# Patient Record
Sex: MALE | Race: BLACK OR AFRICAN AMERICAN | NOT HISPANIC OR LATINO | Employment: FULL TIME | URBAN - METROPOLITAN AREA
[De-identification: names, ages, dates, MRNs, and addresses within clinical notes are randomized per-mention and may not be internally consistent; named-entity substitution may affect disease eponyms.]

---

## 2021-04-30 ENCOUNTER — OFFICE VISIT (OUTPATIENT)
Dept: INTERNAL MEDICINE CLINIC | Facility: CLINIC | Age: 56
End: 2021-04-30
Payer: COMMERCIAL

## 2021-04-30 VITALS
HEART RATE: 85 BPM | OXYGEN SATURATION: 97 % | BODY MASS INDEX: 33.08 KG/M2 | WEIGHT: 249.6 LBS | SYSTOLIC BLOOD PRESSURE: 138 MMHG | DIASTOLIC BLOOD PRESSURE: 84 MMHG | HEIGHT: 73 IN | TEMPERATURE: 98 F

## 2021-04-30 DIAGNOSIS — I10 HYPERTENSION, UNSPECIFIED TYPE: Primary | ICD-10-CM

## 2021-04-30 DIAGNOSIS — Z13.6 ENCOUNTER FOR SCREENING FOR CARDIOVASCULAR DISORDERS: ICD-10-CM

## 2021-04-30 PROCEDURE — 3008F BODY MASS INDEX DOCD: CPT | Performed by: INTERNAL MEDICINE

## 2021-04-30 PROCEDURE — 3725F SCREEN DEPRESSION PERFORMED: CPT | Performed by: INTERNAL MEDICINE

## 2021-04-30 PROCEDURE — 99203 OFFICE O/P NEW LOW 30 MIN: CPT | Performed by: INTERNAL MEDICINE

## 2021-04-30 PROCEDURE — 1036F TOBACCO NON-USER: CPT | Performed by: INTERNAL MEDICINE

## 2021-04-30 RX ORDER — AMLODIPINE BESYLATE AND BENAZEPRIL HYDROCHLORIDE 10; 40 MG/1; MG/1
CAPSULE ORAL
COMMUNITY
Start: 2021-02-27 | End: 2021-12-08 | Stop reason: SDUPTHER

## 2021-04-30 RX ORDER — CLONIDINE 0.3 MG/24H
1 PATCH, EXTENDED RELEASE TRANSDERMAL WEEKLY
COMMUNITY
Start: 2021-04-16 | End: 2022-02-14 | Stop reason: SDUPTHER

## 2021-04-30 NOTE — PROGRESS NOTES
BMI Counseling: Body mass index is 32 93 kg/m²  The BMI is above normal  Exercise recommendations include moderate physical activity 150 minutes/week  Depression Screening and Follow-up Plan: Patient's depression screening was positive with a PHQ-2 score of 0  Clincally patient does not have depression  No treatment is required  Assessment/Plan:    60year old male with HTN; continue med's as prescribed  Last colonosocpy done in 2019; repeat in 2024  Labs ordered including CBC, CMP, and lipid panel  No problem-specific Assessment & Plan notes found for this encounter  Diagnoses and all orders for this visit:    Hypertension, unspecified type  -     CBC and differential; Future    Encounter for screening for cardiovascular disorders  -     CBC and differential; Future  -     Lipid panel; Future  -     Comprehensive metabolic panel; Future    Other orders  -     amLODIPine-benazepril (LOTREL) 10-40 MG per capsule  -     cloNIDine (CATAPRES-TTS-3) 0 3 mg/24 hr          Subjective:      Patient ID: Eliseo Bronson is a 60 y o  male  Patient is a very pleasant 60year old male presenting to the clinic to establish care  He is originally from [facility] and has just moved to the area in March  He is  and has two children, an 6and 15year old  He is a   His wife works for the [employer] as a   He is starting a new job next week  He has a PMH for HTN  He is taking CCB/ARB along with clonidine patch  States his last colonoscopy was 2 years ago and polyps were found  Denies any cp, sob, palpitations, n/v/d/c, fever, chills  The following portions of the patient's history were reviewed and updated as appropriate:   He  has a past medical history of Hypertension  He There are no active problems to display for this patient  He  has no past surgical history on file  His family history is not on file  He  reports that he has never smoked   He has never used smokeless tobacco  He reports that he does not drink alcohol or use drugs  Current Outpatient Medications   Medication Sig Dispense Refill    amLODIPine-benazepril (LOTREL) 10-40 MG per capsule       cloNIDine (CATAPRES-TTS-3) 0 3 mg/24 hr        No current facility-administered medications for this visit  Current Outpatient Medications on File Prior to Visit   Medication Sig    amLODIPine-benazepril (LOTREL) 10-40 MG per capsule     cloNIDine (CATAPRES-TTS-3) 0 3 mg/24 hr      No current facility-administered medications on file prior to visit  He has No Known Allergies       Review of Systems   Constitutional: Negative for activity change and appetite change  HENT: Negative for congestion, dental problem and drooling  Eyes: Negative for photophobia, redness and visual disturbance  Respiratory: Negative for cough, chest tightness, shortness of breath and wheezing  Gastrointestinal: Negative for abdominal pain, blood in stool, constipation, diarrhea, nausea and vomiting  Endocrine: Negative for polydipsia, polyphagia and polyuria  Genitourinary: Negative for decreased urine volume and difficulty urinating  Musculoskeletal: Positive for back pain  Negative for joint swelling, myalgias and neck pain  Neurological: Negative for weakness and numbness  Psychiatric/Behavioral: Negative for decreased concentration, dysphoric mood and hallucinations  All other systems reviewed and are negative  Objective:      /84 (BP Location: Left arm, Patient Position: Sitting, Cuff Size: Adult)   Pulse 85   Temp 98 °F (36 7 °C) (Temporal)   Ht 6' 1" (1 854 m)   Wt 113 kg (249 lb 9 6 oz)   SpO2 97%   BMI 32 93 kg/m²          Physical Exam  Vitals signs and nursing note reviewed  Constitutional:       Appearance: Normal appearance  HENT:      Head: Normocephalic and atraumatic        Right Ear: Tympanic membrane normal       Left Ear: Tympanic membrane normal       Mouth/Throat:      Mouth: Mucous membranes are moist    Neck:      Musculoskeletal: Normal range of motion  Vascular: No carotid bruit  Cardiovascular:      Rate and Rhythm: Normal rate and regular rhythm  Heart sounds: Normal heart sounds  Pulmonary:      Effort: Pulmonary effort is normal       Breath sounds: Normal breath sounds  Abdominal:      General: Abdomen is flat  Bowel sounds are normal       Palpations: Abdomen is soft  Hernia: A hernia is present  Musculoskeletal: Normal range of motion  Lymphadenopathy:      Cervical: No cervical adenopathy  Skin:     General: Skin is warm and dry  Neurological:      General: No focal deficit present  Mental Status: He is alert and oriented to person, place, and time     Psychiatric:         Mood and Affect: Mood normal          Behavior: Behavior normal

## 2021-05-06 ENCOUNTER — APPOINTMENT (OUTPATIENT)
Dept: LAB | Facility: HOSPITAL | Age: 56
End: 2021-05-06
Attending: INTERNAL MEDICINE
Payer: COMMERCIAL

## 2021-05-06 DIAGNOSIS — N18.31 STAGE 3A CHRONIC KIDNEY DISEASE (HCC): Primary | ICD-10-CM

## 2021-05-06 DIAGNOSIS — R73.01 ELEVATED FASTING BLOOD SUGAR: ICD-10-CM

## 2021-05-24 ENCOUNTER — TELEPHONE (OUTPATIENT)
Dept: NEPHROLOGY | Facility: CLINIC | Age: 56
End: 2021-05-24

## 2021-09-15 ENCOUNTER — TELEPHONE (OUTPATIENT)
Dept: NEPHROLOGY | Facility: CLINIC | Age: 56
End: 2021-09-15

## 2021-09-21 ENCOUNTER — TELEPHONE (OUTPATIENT)
Dept: NEPHROLOGY | Facility: CLINIC | Age: 56
End: 2021-09-21

## 2021-09-22 ENCOUNTER — CONSULT (OUTPATIENT)
Dept: NEPHROLOGY | Facility: CLINIC | Age: 56
End: 2021-09-22
Payer: COMMERCIAL

## 2021-09-22 VITALS
DIASTOLIC BLOOD PRESSURE: 80 MMHG | WEIGHT: 257 LBS | SYSTOLIC BLOOD PRESSURE: 120 MMHG | HEART RATE: 64 BPM | TEMPERATURE: 97 F | RESPIRATION RATE: 16 BRPM | BODY MASS INDEX: 32.98 KG/M2 | HEIGHT: 74 IN

## 2021-09-22 DIAGNOSIS — M89.9 CHRONIC KIDNEY DISEASE-MINERAL AND BONE DISORDER: ICD-10-CM

## 2021-09-22 DIAGNOSIS — N18.31 STAGE 3A CHRONIC KIDNEY DISEASE (HCC): ICD-10-CM

## 2021-09-22 DIAGNOSIS — N18.9 CHRONIC KIDNEY DISEASE-MINERAL AND BONE DISORDER: ICD-10-CM

## 2021-09-22 DIAGNOSIS — E83.9 CHRONIC KIDNEY DISEASE-MINERAL AND BONE DISORDER: ICD-10-CM

## 2021-09-22 DIAGNOSIS — I10 ESSENTIAL HYPERTENSION: Primary | ICD-10-CM

## 2021-09-22 PROCEDURE — 3008F BODY MASS INDEX DOCD: CPT | Performed by: INTERNAL MEDICINE

## 2021-09-22 PROCEDURE — 1036F TOBACCO NON-USER: CPT | Performed by: INTERNAL MEDICINE

## 2021-09-22 PROCEDURE — 99204 OFFICE O/P NEW MOD 45 MIN: CPT | Performed by: INTERNAL MEDICINE

## 2021-09-22 NOTE — PROGRESS NOTES
NEPHROLOGY OFFICE CONSULT  Breanna Rivas 60 y o  male MRN: [id number]    Encounter: [id number] 9/22/2021    REASON FOR VISIT: Breanna Rivas is a 60 y o male who was referred by Warren Clay MD for evaluation of Chronic Kidney Disease and Consult    HPI:    Chun Pearson came in today for evaluation management of CKD  58-year-old gentleman with blood pressure problem many years was found to abnormal creatinine so was advised to see me     Patient is quite asymptomatic denies any complaint    He is a  and has some back pain sometime take Advil but not regularly     Denies any chest pain palpitation     Denies any urinary complaint      REVIEW OF SYSTEMS:    Review of Systems   Constitutional: Negative for activity change and fatigue  HENT: Negative for congestion and ear discharge  Eyes: Negative for photophobia and pain  Respiratory: Negative for apnea and choking  Cardiovascular: Negative for chest pain and palpitations  Gastrointestinal: Negative for abdominal distention and blood in stool  Endocrine: Negative for heat intolerance and polyphagia  Genitourinary: Negative for flank pain and urgency  Musculoskeletal: Negative for neck pain and neck stiffness  Skin: Negative for color change and wound  Allergic/Immunologic: Negative for food allergies and immunocompromised state  Neurological: Negative for seizures and facial asymmetry  Hematological: Negative for adenopathy  Does not bruise/bleed easily  Psychiatric/Behavioral: Negative for self-injury and suicidal ideas  PAST MEDICAL HISTORY:  Past Medical History:   Diagnosis Date    Chronic kidney disease     Hypertension        PAST SURGICAL HISTORY:  History reviewed  No pertinent surgical history      SOCIAL HISTORY:  Social History     Substance and Sexual Activity   Alcohol Use Never     Social History     Substance and Sexual Activity   Drug Use Never     Social History     Tobacco Use   Smoking Status Never Smoker   Smokeless Tobacco Never Used       FAMILY HISTORY:  Family History   Problem Relation Age of Onset    No Known Problems Mother     No Known Problems Father     Cancer Sister     Cancer Brother     No Known Problems Brother     No Known Problems Brother        MEDICATIONS:    Current Outpatient Medications:     amLODIPine-benazepril (LOTREL) 10-40 MG per capsule, , Disp: , Rfl:     cloNIDine (CATAPRES-TTS-3) 0 3 mg/24 hr, , Disp: , Rfl:     PHYSICAL EXAM:  Vitals:    09/22/21 1627   BP: 120/80   BP Location: Right arm   Patient Position: Sitting   Pulse: 64   Resp: 16   Temp: (!) 97 °F (36 1 °C)   TempSrc: Temporal   Weight: 117 kg (257 lb)   Height: 6' 1 5" (1 867 m)     Body mass index is 33 45 kg/m²  Physical Exam  Constitutional:       General: He is not in acute distress  Appearance: He is well-developed  He is obese  HENT:      Head: Normocephalic  Eyes:      General: No scleral icterus  Conjunctiva/sclera: Conjunctivae normal    Neck:      Vascular: No JVD  Cardiovascular:      Rate and Rhythm: Normal rate and regular rhythm  Heart sounds: Normal heart sounds  Pulmonary:      Effort: Pulmonary effort is normal       Breath sounds: Normal breath sounds  No wheezing  Abdominal:      General: Bowel sounds are normal       Palpations: Abdomen is soft  Tenderness: There is no abdominal tenderness  Musculoskeletal:         General: No swelling  Normal range of motion  Cervical back: Normal range of motion and neck supple  Skin:     General: Skin is warm  Findings: No rash  Neurological:      General: No focal deficit present  Mental Status: He is alert and oriented to person, place, and time     Psychiatric:         Behavior: Behavior normal          LAB RESULTS:  Results for orders placed or performed in visit on 05/05/21   CBC and differential   Result Value Ref Range    WBC 3 75 (L) 4 31 - 10 16 Thousand/uL    RBC 4 84 3 88 - 5 62 Million/uL    Hemoglobin 14 1 12 0 - 17 0 g/dL    Hematocrit 44 5 36 5 - 49 3 %    MCV 92 82 - 98 fL    MCH 29 1 26 8 - 34 3 pg    MCHC 31 7 31 4 - 37 4 g/dL    RDW 13 4 11 6 - 15 1 %    MPV 10 6 8 9 - 12 7 fL    Platelets 317 579 - 031 Thousands/uL    nRBC 0 /100 WBCs    Neutrophils Relative 41 (L) 43 - 75 %    Immat GRANS % 0 0 - 2 %    Lymphocytes Relative 48 (H) 14 - 44 %    Monocytes Relative 8 4 - 12 %    Eosinophils Relative 2 0 - 6 %    Basophils Relative 1 0 - 1 %    Neutrophils Absolute 1 54 (L) 1 85 - 7 62 Thousands/µL    Immature Grans Absolute 0 00 0 00 - 0 20 Thousand/uL    Lymphocytes Absolute 1 79 0 60 - 4 47 Thousands/µL    Monocytes Absolute 0 31 0 17 - 1 22 Thousand/µL    Eosinophils Absolute 0 08 0 00 - 0 61 Thousand/µL    Basophils Absolute 0 03 0 00 - 0 10 Thousands/µL   Lipid panel   Result Value Ref Range    Cholesterol 137 50 - 200 mg/dL    Triglycerides 42 <=150 mg/dL    HDL, Direct 34 (L) >=40 mg/dL    LDL Calculated 95 0 - 100 mg/dL    Non-HDL-Chol (CHOL-HDL) 103 mg/dl   Comprehensive metabolic panel   Result Value Ref Range    Sodium 141 136 - 145 mmol/L    Potassium 4 1 3 5 - 5 3 mmol/L    Chloride 105 100 - 108 mmol/L    CO2 25 21 - 32 mmol/L    ANION GAP 11 4 - 13 mmol/L    BUN 20 5 - 25 mg/dL    Creatinine 1 55 (H) 0 60 - 1 30 mg/dL    Glucose, Fasting 108 (H) 65 - 99 mg/dL    Calcium 8 6 8 3 - 10 1 mg/dL    AST 28 5 - 45 U/L    ALT 54 12 - 78 U/L    Alkaline Phosphatase 77 46 - 116 U/L    Total Protein 7 2 6 4 - 8 2 g/dL    Albumin 3 6 3 5 - 5 0 g/dL    Total Bilirubin 0 42 0 20 - 1 00 mg/dL    eGFR 56 ml/min/1 73sq m   HEMOGLOBIN A1C W/ EAG ESTIMATION   Result Value Ref Range    Hemoglobin A1C 6 2 (H) Normal 3 8-5 6%; PreDiabetic 5 7-6 4%;  Diabetic >=6 5%; Glycemic control for adults with diabetes <7 0% %     mg/dl       ASSESSMENT and PLAN:    Stage 3a chronic kidney disease (HCC)  Lab Results   Component Value Date    EGFR 56 05/06/2021    CREATININE 1 55 (H) 05/06/2021 Patient GFR is about 56 making it stage III  Possible etiology hypertensive nephrosclerosis  I do not have previous data saw like to repeated after proper hydration to see is that an acute component or not    Pathophysiology of kidney disease discussed at length with the patient  Importance of hydration and avoidance of nephrotoxic medicine also discussed with him  Asymptomatic and progressive nature of kidney disease also discussed with him     I will repeat blood test urine test and kidney ultrasound in 1 month    Essential hypertension  Blood pressure is very well controlled with present medication which include ACE-inhibitor which I will continue  Importance of blood pressure control also discussed with him    Chronic kidney disease-mineral and bone disorder  Lab Results   Component Value Date    EGFR 56 05/06/2021    CREATININE 1 55 (H) 05/06/2021   PTH and phosphorus along with vitamin-D will be measured as part of the CKD management    Everything discussed at length with the patient  I will see him back in 3 months  He will get blood and urine test before that visit  Advised to lose weight also as part of the CKD management  Thank you very much for consultation I will monitor the patient with you      Portions of the record may have been created with voice recognition software  Occasional wrong word or "sound a like" substitutions may have occurred due to the inherent limitations of voice recognition software  Read the chart carefully and recognize, using context, where substitutions have occurred  If you have any questions, please contact the dictating provider

## 2021-09-22 NOTE — PATIENT INSTRUCTIONS
Chronic Kidney Disease   AMBULATORY CARE:   Chronic kidney disease (CKD)  is the gradual and permanent loss of kidney function  Normally, the kidneys remove fluid, chemicals, and waste from your blood  These wastes are turned into urine by your kidneys  CKD may worsen over time and lead to kidney failure  Common signs and symptoms include the following:   · Changes in how often you need to urinate    · Swelling in your arms, legs, or feet    · Shortness of breath    · Fatigue or weakness    · Bad or bitter taste in your mouth    · Nausea, vomiting, or loss of appetite    Call your local emergency number (911 in the [address] [facility]) if:   · You have a seizure  · You have shortness of breath  Seek care immediately if:   · You are confused and very drowsy  Call your doctor or nephrologist if:   · You suddenly gain or lose more weight than your healthcare provider has told you is okay  · You have itchy skin or a rash  · You urinate more or less than you normally do  · You have blood in your urine  · You have nausea and are vomiting  · You have fatigue or muscle weakness  · You have hiccups that will not stop  · You have questions or concerns about your condition or care  How CKD is diagnosed:  CKD has 5 stages  Your healthcare provider will use results from the following tests to find the stage of CKD you have:  · Blood and urine tests  show how well your kidneys are working  They may also show the cause of your CKD  · Ultrasound, CT scan, or MRI  pictures may be used to check your kidneys  You may be given contrast liquid to help your kidneys show up better in the pictures  Tell the healthcare provider if you have ever had an allergic reaction to contrast liquid  Do not enter the MRI room with anything metal  Metal can cause serious injury  Tell the healthcare provider if you have any metal in or on your body      · A biopsy  is a procedure to remove a small piece of tissue from your kidney  It is done to find the cause of your CKD  Treatment  can help control signs and symptoms, and prevent a worse stage of CKD  Your care team may include specialists, such as a dietitian or a heart specialist  This depends on the stage of your CKD and if you have other health conditions to manage  Healthcare providers will work with you to create a plan based on your decisions for treatment  Your treatment plan may include any of the following:  · Medicines  may be given to decrease your blood pressure and get rid of extra fluid  You may also receive medicine to manage health conditions that may occur with CKD, such as anemia, diabetes, and heart disease  · Dialysis  is a treatment to remove chemicals and waste from your blood when your kidneys can no longer do this  · Surgery  may be needed to create an arteriovenous fistula (AVF) in your arm or insert a catheter into your abdomen  This is done so you can receive dialysis  · A kidney transplant  may be done if your CKD becomes severe  What you can do to manage CKD: Management may include making some lifestyle changes  Tell your healthcare provider if you have any concerns about being able to make changes  He or she can help you find solutions, including working with specialists  Ask for help creating a plan to break large goals into smaller steps  Your plan may include any of the following:  · Manage other health conditions  Your healthcare provider will work with you to make a care plan that meets your needs  You will be checked regularly for heart disease or other conditions that can make CKD worse, such as diabetes  Your blood pressure will be closely monitored  You will also get a target blood pressure and help making a plan to reach your target  This may include taking your blood pressure at home  · Maintain a healthy weight  Your weight and body mass index (BMI) will be checked regularly   BMI helps find if your weight is healthy for your height  Your healthcare provider will use other tests to check your muscle and protein levels  Extra weight can strain your kidneys  A low weight or low muscle mass can make you feel more tired  You may have trouble doing your daily activities  Ask your provider what a healthy weight is for you  He or she can help you create a plan to lose or gain weight safely, if needed  The plan may include keeping a food diary  This is a list of foods and liquids you have each day  Your provider will use the diary to help you make changes, if needed  Changes are based on your health and any other conditions you have, such as diabetes  · Create an exercise plan  Regular exercise can help you manage CKD, high blood pressure, and diabetes  Exercise also helps control weight  Your provider can help you create exercise goals and a plan to reach those goals  For example, your goal may be to exercise for 30 minutes in a day  Your plan can include breaking exercise into 10 minute sessions, 3 times during the day  · Create a healthy eating plan  Your provider may tell you to eat food low in potassium, phosphorus, or protein  Your provider may also recommend vitamin or mineral supplements  Do not take any supplements without talking to your provider  A dietitian can help you plan meals if needed  Ask how much liquid to drink each day and which liquids are best for you  · Limit sodium (salt) as directed  You may need to limit sodium to less than 2,300 milligrams (mg) each day  Ask your dietitian or healthcare provider how much sodium you can have each day  The amount depends on your stage of kidney disease  Table salt, canned foods, soups, salted snacks, and processed meats, like deli meats and sausage, are high in sodium  Your provider or a dietitian can show you how to read food labels for sodium  · Limit alcohol as directed  Alcohol can cause fluid retention and can affect your kidneys   Ask how much alcohol is safe for you  A drink of alcohol is 12 ounces of beer, 5 ounces of wine, or 1½ ounces of liquor  · Do not smoke  Nicotine and other chemicals in cigarettes and cigars can cause kidney damage  Ask your provider for information if you currently smoke and need help to quit  E-cigarettes or smokeless tobacco still contain nicotine  Talk to your provider before you use these products  · Ask about over-the-counter medicines  Medicines such as NSAIDs and laxatives may harm your kidneys  Some cough and cold medicines can raise your blood pressure  Always ask if a medicine is safe before you take it  · Ask about vaccines you may need  CKD can increase your risk for infections such as pneumonia, influenza, and hepatitis  Vaccines lower your risk for infection  Your healthcare provider will tell you which vaccines you need and when to get them  Follow up with your doctor or nephrologist as directed: You will need to return for tests to monitor your kidney and nerve function, and your parathyroid hormone level  Your medicines may be changed, based on certain test results  Write down your questions so you remember to ask them during your visits  © Copyright [employer] 2021 Information is for End User's use only and may not be sold, redistributed or otherwise used for commercial purposes  All illustrations and images included in CareNotes® are the copyrighted property of [employer]  or Rachel Tracey  The above information is an  only  It is not intended as medical advice for individual conditions or treatments  Talk to your doctor, nurse or pharmacist before following any medical regimen to see if it is safe and effective for you

## 2021-09-22 NOTE — ASSESSMENT & PLAN NOTE
Blood pressure is very well controlled with present medication which include ACE-inhibitor which I will continue    Importance of blood pressure control also discussed with him

## 2021-09-22 NOTE — ASSESSMENT & PLAN NOTE
Lab Results   Component Value Date    EGFR 56 05/06/2021    CREATININE 1 55 (H) 05/06/2021   Patient GFR is about 56 making it stage III  Possible etiology hypertensive nephrosclerosis  I do not have previous data saw like to repeated after proper hydration to see is that an acute component or not    Pathophysiology of kidney disease discussed at length with the patient  Importance of hydration and avoidance of nephrotoxic medicine also discussed with him    Asymptomatic and progressive nature of kidney disease also discussed with him     I will repeat blood test urine test and kidney ultrasound in 1 month

## 2021-09-22 NOTE — ASSESSMENT & PLAN NOTE
Lab Results   Component Value Date    EGFR 56 05/06/2021    CREATININE 1 55 (H) 05/06/2021   PTH and phosphorus along with vitamin-D will be measured as part of the CKD management

## 2021-10-08 ENCOUNTER — TELEPHONE (OUTPATIENT)
Dept: NEPHROLOGY | Facility: CLINIC | Age: 56
End: 2021-10-08

## 2021-10-21 DIAGNOSIS — Z12.11 SCREEN FOR COLON CANCER: Primary | ICD-10-CM

## 2021-10-26 ENCOUNTER — RA CDI HCC (OUTPATIENT)
Dept: OTHER | Facility: HOSPITAL | Age: 56
End: 2021-10-26

## 2021-11-04 ENCOUNTER — OFFICE VISIT (OUTPATIENT)
Dept: INTERNAL MEDICINE CLINIC | Facility: CLINIC | Age: 56
End: 2021-11-04
Payer: COMMERCIAL

## 2021-11-04 VITALS
HEIGHT: 74 IN | OXYGEN SATURATION: 97 % | DIASTOLIC BLOOD PRESSURE: 90 MMHG | HEART RATE: 58 BPM | SYSTOLIC BLOOD PRESSURE: 134 MMHG | TEMPERATURE: 98.4 F | WEIGHT: 257.8 LBS | BODY MASS INDEX: 33.09 KG/M2

## 2021-11-04 DIAGNOSIS — I10 ESSENTIAL HYPERTENSION: Primary | ICD-10-CM

## 2021-11-04 DIAGNOSIS — N18.31 STAGE 3A CHRONIC KIDNEY DISEASE (HCC): ICD-10-CM

## 2021-11-04 DIAGNOSIS — Z13.6 ENCOUNTER FOR LIPID SCREENING FOR CARDIOVASCULAR DISEASE: ICD-10-CM

## 2021-11-04 DIAGNOSIS — Z11.4 ENCOUNTER FOR SCREENING FOR HIV: ICD-10-CM

## 2021-11-04 DIAGNOSIS — Z23 NEED FOR VACCINATION: ICD-10-CM

## 2021-11-04 DIAGNOSIS — Z13.220 ENCOUNTER FOR LIPID SCREENING FOR CARDIOVASCULAR DISEASE: ICD-10-CM

## 2021-11-04 DIAGNOSIS — Z11.59 ENCOUNTER FOR HEPATITIS C SCREENING TEST FOR LOW RISK PATIENT: ICD-10-CM

## 2021-11-04 PROCEDURE — 99214 OFFICE O/P EST MOD 30 MIN: CPT | Performed by: INTERNAL MEDICINE

## 2021-11-04 PROCEDURE — 3080F DIAST BP >= 90 MM HG: CPT | Performed by: INTERNAL MEDICINE

## 2021-11-04 PROCEDURE — 3075F SYST BP GE 130 - 139MM HG: CPT | Performed by: INTERNAL MEDICINE

## 2021-11-04 PROCEDURE — 90471 IMMUNIZATION ADMIN: CPT | Performed by: INTERNAL MEDICINE

## 2021-11-04 PROCEDURE — 1036F TOBACCO NON-USER: CPT | Performed by: INTERNAL MEDICINE

## 2021-11-04 PROCEDURE — 90682 RIV4 VACC RECOMBINANT DNA IM: CPT | Performed by: INTERNAL MEDICINE

## 2021-11-04 PROCEDURE — 3008F BODY MASS INDEX DOCD: CPT | Performed by: INTERNAL MEDICINE

## 2021-11-20 ENCOUNTER — LAB (OUTPATIENT)
Dept: LAB | Facility: HOSPITAL | Age: 56
End: 2021-11-20
Payer: COMMERCIAL

## 2021-11-20 DIAGNOSIS — N18.31 STAGE 3A CHRONIC KIDNEY DISEASE (HCC): ICD-10-CM

## 2021-11-20 DIAGNOSIS — R73.01 ELEVATED FASTING BLOOD SUGAR: ICD-10-CM

## 2021-11-20 DIAGNOSIS — Z11.4 ENCOUNTER FOR SCREENING FOR HIV: ICD-10-CM

## 2021-11-20 DIAGNOSIS — Z13.6 ENCOUNTER FOR LIPID SCREENING FOR CARDIOVASCULAR DISEASE: ICD-10-CM

## 2021-11-20 DIAGNOSIS — I10 ESSENTIAL HYPERTENSION: ICD-10-CM

## 2021-11-20 DIAGNOSIS — Z11.59 ENCOUNTER FOR HEPATITIS C SCREENING TEST FOR LOW RISK PATIENT: ICD-10-CM

## 2021-11-20 DIAGNOSIS — Z13.220 ENCOUNTER FOR LIPID SCREENING FOR CARDIOVASCULAR DISEASE: ICD-10-CM

## 2021-11-20 LAB
ALBUMIN SERPL BCP-MCNC: 3.9 G/DL (ref 3.5–5)
ALP SERPL-CCNC: 75 U/L (ref 46–116)
ALT SERPL W P-5'-P-CCNC: 49 U/L (ref 12–78)
ANION GAP SERPL CALCULATED.3IONS-SCNC: 7 MMOL/L (ref 4–13)
AST SERPL W P-5'-P-CCNC: 24 U/L (ref 5–45)
BASOPHILS # BLD AUTO: 0.03 THOUSANDS/ΜL (ref 0–0.1)
BASOPHILS NFR BLD AUTO: 1 % (ref 0–1)
BILIRUB SERPL-MCNC: 0.87 MG/DL (ref 0.2–1)
BUN SERPL-MCNC: 13 MG/DL (ref 5–25)
CALCIUM SERPL-MCNC: 8.6 MG/DL (ref 8.3–10.1)
CHLORIDE SERPL-SCNC: 105 MMOL/L (ref 100–108)
CHOLEST SERPL-MCNC: 148 MG/DL
CO2 SERPL-SCNC: 29 MMOL/L (ref 21–32)
CREAT SERPL-MCNC: 1.52 MG/DL (ref 0.6–1.3)
EOSINOPHIL # BLD AUTO: 0.07 THOUSAND/ΜL (ref 0–0.61)
EOSINOPHIL NFR BLD AUTO: 2 % (ref 0–6)
ERYTHROCYTE [DISTWIDTH] IN BLOOD BY AUTOMATED COUNT: 13.6 % (ref 11.6–15.1)
GFR SERPL CREATININE-BSD FRML MDRD: 58 ML/MIN/1.73SQ M
GLUCOSE P FAST SERPL-MCNC: 98 MG/DL (ref 65–99)
HCT VFR BLD AUTO: 46.6 % (ref 36.5–49.3)
HCV AB SER QL: NORMAL
HDLC SERPL-MCNC: 40 MG/DL
HGB BLD-MCNC: 14.8 G/DL (ref 12–17)
IMM GRANULOCYTES # BLD AUTO: 0.01 THOUSAND/UL (ref 0–0.2)
IMM GRANULOCYTES NFR BLD AUTO: 0 % (ref 0–2)
LDLC SERPL CALC-MCNC: 94 MG/DL (ref 0–100)
LYMPHOCYTES # BLD AUTO: 1.72 THOUSANDS/ΜL (ref 0.6–4.47)
LYMPHOCYTES NFR BLD AUTO: 39 % (ref 14–44)
MCH RBC QN AUTO: 29.5 PG (ref 26.8–34.3)
MCHC RBC AUTO-ENTMCNC: 31.8 G/DL (ref 31.4–37.4)
MCV RBC AUTO: 93 FL (ref 82–98)
MONOCYTES # BLD AUTO: 0.35 THOUSAND/ΜL (ref 0.17–1.22)
MONOCYTES NFR BLD AUTO: 8 % (ref 4–12)
NEUTROPHILS # BLD AUTO: 2.18 THOUSANDS/ΜL (ref 1.85–7.62)
NEUTS SEG NFR BLD AUTO: 50 % (ref 43–75)
NONHDLC SERPL-MCNC: 108 MG/DL
NRBC BLD AUTO-RTO: 0 /100 WBCS
PLATELET # BLD AUTO: 216 THOUSANDS/UL (ref 149–390)
PMV BLD AUTO: 10.1 FL (ref 8.9–12.7)
POTASSIUM SERPL-SCNC: 4.4 MMOL/L (ref 3.5–5.3)
PROT SERPL-MCNC: 7.4 G/DL (ref 6.4–8.2)
RBC # BLD AUTO: 5.02 MILLION/UL (ref 3.88–5.62)
SODIUM SERPL-SCNC: 141 MMOL/L (ref 136–145)
TRIGL SERPL-MCNC: 69 MG/DL
WBC # BLD AUTO: 4.36 THOUSAND/UL (ref 4.31–10.16)

## 2021-11-20 PROCEDURE — 87389 HIV-1 AG W/HIV-1&-2 AB AG IA: CPT

## 2021-11-20 PROCEDURE — 85025 COMPLETE CBC W/AUTO DIFF WBC: CPT

## 2021-11-20 PROCEDURE — 80053 COMPREHEN METABOLIC PANEL: CPT

## 2021-11-20 PROCEDURE — 36415 COLL VENOUS BLD VENIPUNCTURE: CPT

## 2021-11-20 PROCEDURE — 83036 HEMOGLOBIN GLYCOSYLATED A1C: CPT

## 2021-11-20 PROCEDURE — 80061 LIPID PANEL: CPT

## 2021-11-20 PROCEDURE — 86803 HEPATITIS C AB TEST: CPT

## 2021-11-21 LAB — HIV 1+2 AB+HIV1 P24 AG SERPL QL IA: NORMAL

## 2021-11-22 LAB
EST. AVERAGE GLUCOSE BLD GHB EST-MCNC: 134 MG/DL
HBA1C MFR BLD: 6.3 %

## 2021-11-23 ENCOUNTER — TELEPHONE (OUTPATIENT)
Dept: NEPHROLOGY | Facility: CLINIC | Age: 56
End: 2021-11-23

## 2021-12-08 DIAGNOSIS — I10 ESSENTIAL HYPERTENSION: Primary | ICD-10-CM

## 2021-12-08 RX ORDER — AMLODIPINE BESYLATE AND BENAZEPRIL HYDROCHLORIDE 10; 40 MG/1; MG/1
1 CAPSULE ORAL DAILY
Qty: 90 CAPSULE | Refills: 0 | Status: SHIPPED | OUTPATIENT
Start: 2021-12-08 | End: 2022-02-10 | Stop reason: SDUPTHER

## 2021-12-15 ENCOUNTER — TELEPHONE (OUTPATIENT)
Dept: NEPHROLOGY | Facility: CLINIC | Age: 56
End: 2021-12-15

## 2021-12-16 ENCOUNTER — TELEPHONE (OUTPATIENT)
Dept: NEPHROLOGY | Facility: CLINIC | Age: 56
End: 2021-12-16

## 2022-01-24 ENCOUNTER — TELEPHONE (OUTPATIENT)
Dept: NEPHROLOGY | Facility: CLINIC | Age: 57
End: 2022-01-24

## 2022-01-28 ENCOUNTER — TELEPHONE (OUTPATIENT)
Dept: NEPHROLOGY | Facility: CLINIC | Age: 57
End: 2022-01-28

## 2022-01-28 NOTE — TELEPHONE ENCOUNTER
Appointment was confirmed with patient wife Colleen Villanueva and the patient  I called the patient wife Colleen Villanueva, because the patient voice mail box was not set up, and I was not able to leave a message    Jmaal Fitzgerald,

## 2022-01-31 ENCOUNTER — APPOINTMENT (OUTPATIENT)
Dept: LAB | Facility: HOSPITAL | Age: 57
End: 2022-01-31
Payer: COMMERCIAL

## 2022-01-31 ENCOUNTER — OFFICE VISIT (OUTPATIENT)
Dept: NEPHROLOGY | Facility: CLINIC | Age: 57
End: 2022-01-31
Payer: COMMERCIAL

## 2022-01-31 VITALS
RESPIRATION RATE: 16 BRPM | BODY MASS INDEX: 33.32 KG/M2 | SYSTOLIC BLOOD PRESSURE: 120 MMHG | OXYGEN SATURATION: 97 % | TEMPERATURE: 97.3 F | WEIGHT: 259.6 LBS | HEART RATE: 62 BPM | DIASTOLIC BLOOD PRESSURE: 80 MMHG | HEIGHT: 74 IN

## 2022-01-31 DIAGNOSIS — Z13.220 ENCOUNTER FOR LIPID SCREENING FOR CARDIOVASCULAR DISEASE: ICD-10-CM

## 2022-01-31 DIAGNOSIS — I10 ESSENTIAL HYPERTENSION: ICD-10-CM

## 2022-01-31 DIAGNOSIS — E83.9 CHRONIC KIDNEY DISEASE-MINERAL AND BONE DISORDER: ICD-10-CM

## 2022-01-31 DIAGNOSIS — N18.31 STAGE 3A CHRONIC KIDNEY DISEASE (HCC): ICD-10-CM

## 2022-01-31 DIAGNOSIS — Z13.6 ENCOUNTER FOR LIPID SCREENING FOR CARDIOVASCULAR DISEASE: ICD-10-CM

## 2022-01-31 DIAGNOSIS — M89.9 CHRONIC KIDNEY DISEASE-MINERAL AND BONE DISORDER: ICD-10-CM

## 2022-01-31 DIAGNOSIS — N18.9 CHRONIC KIDNEY DISEASE-MINERAL AND BONE DISORDER: ICD-10-CM

## 2022-01-31 DIAGNOSIS — N18.31 STAGE 3A CHRONIC KIDNEY DISEASE (HCC): Primary | ICD-10-CM

## 2022-01-31 DIAGNOSIS — N18.32 STAGE 3B CHRONIC KIDNEY DISEASE (HCC): ICD-10-CM

## 2022-01-31 LAB
25(OH)D3 SERPL-MCNC: 25.5 NG/ML (ref 30–100)
ANION GAP SERPL CALCULATED.3IONS-SCNC: 5 MMOL/L (ref 4–13)
BASOPHILS # BLD AUTO: 0.02 THOUSANDS/ΜL (ref 0–0.1)
BASOPHILS NFR BLD AUTO: 0 % (ref 0–1)
BUN SERPL-MCNC: 15 MG/DL (ref 5–25)
CALCIUM SERPL-MCNC: 9 MG/DL (ref 8.3–10.1)
CHLORIDE SERPL-SCNC: 104 MMOL/L (ref 100–108)
CO2 SERPL-SCNC: 31 MMOL/L (ref 21–32)
CREAT SERPL-MCNC: 1.59 MG/DL (ref 0.6–1.3)
EOSINOPHIL # BLD AUTO: 0.1 THOUSAND/ΜL (ref 0–0.61)
EOSINOPHIL NFR BLD AUTO: 2 % (ref 0–6)
ERYTHROCYTE [DISTWIDTH] IN BLOOD BY AUTOMATED COUNT: 13.9 % (ref 11.6–15.1)
GFR SERPL CREATININE-BSD FRML MDRD: 46 ML/MIN/1.73SQ M
GLUCOSE P FAST SERPL-MCNC: 91 MG/DL (ref 65–99)
HCT VFR BLD AUTO: 46.3 % (ref 36.5–49.3)
HGB BLD-MCNC: 14.7 G/DL (ref 12–17)
IMM GRANULOCYTES # BLD AUTO: 0.01 THOUSAND/UL (ref 0–0.2)
IMM GRANULOCYTES NFR BLD AUTO: 0 % (ref 0–2)
LYMPHOCYTES # BLD AUTO: 1.86 THOUSANDS/ΜL (ref 0.6–4.47)
LYMPHOCYTES NFR BLD AUTO: 39 % (ref 14–44)
MCH RBC QN AUTO: 29.2 PG (ref 26.8–34.3)
MCHC RBC AUTO-ENTMCNC: 31.7 G/DL (ref 31.4–37.4)
MCV RBC AUTO: 92 FL (ref 82–98)
MONOCYTES # BLD AUTO: 0.43 THOUSAND/ΜL (ref 0.17–1.22)
MONOCYTES NFR BLD AUTO: 9 % (ref 4–12)
NEUTROPHILS # BLD AUTO: 2.39 THOUSANDS/ΜL (ref 1.85–7.62)
NEUTS SEG NFR BLD AUTO: 50 % (ref 43–75)
NRBC BLD AUTO-RTO: 0 /100 WBCS
PHOSPHATE SERPL-MCNC: 3.1 MG/DL (ref 2.7–4.5)
PLATELET # BLD AUTO: 193 THOUSANDS/UL (ref 149–390)
PMV BLD AUTO: 10.2 FL (ref 8.9–12.7)
POTASSIUM SERPL-SCNC: 4 MMOL/L (ref 3.5–5.3)
PTH-INTACT SERPL-MCNC: 77.6 PG/ML (ref 18.4–80.1)
RBC # BLD AUTO: 5.03 MILLION/UL (ref 3.88–5.62)
SODIUM SERPL-SCNC: 140 MMOL/L (ref 136–145)
WBC # BLD AUTO: 4.81 THOUSAND/UL (ref 4.31–10.16)

## 2022-01-31 PROCEDURE — 85025 COMPLETE CBC W/AUTO DIFF WBC: CPT

## 2022-01-31 PROCEDURE — 80048 BASIC METABOLIC PNL TOTAL CA: CPT

## 2022-01-31 PROCEDURE — 36415 COLL VENOUS BLD VENIPUNCTURE: CPT

## 2022-01-31 PROCEDURE — 84100 ASSAY OF PHOSPHORUS: CPT

## 2022-01-31 PROCEDURE — 99214 OFFICE O/P EST MOD 30 MIN: CPT | Performed by: INTERNAL MEDICINE

## 2022-01-31 PROCEDURE — 83970 ASSAY OF PARATHORMONE: CPT

## 2022-01-31 PROCEDURE — 82306 VITAMIN D 25 HYDROXY: CPT

## 2022-01-31 NOTE — PATIENT INSTRUCTIONS
Chronic Kidney Disease   AMBULATORY CARE:   Chronic kidney disease (CKD)  is the gradual and permanent loss of kidney function  Normally, the kidneys remove fluid, chemicals, and waste from your blood  These wastes are turned into urine by your kidneys  CKD may worsen over time and lead to kidney failure  Common signs and symptoms include the following:   · Changes in how often you need to urinate    · Swelling in your arms, legs, or feet    · Shortness of breath    · Fatigue or weakness    · Bad or bitter taste in your mouth    · Nausea, vomiting, or loss of appetite    Call your local emergency number (911 in the [address] [facility]) if:   · You have a seizure  · You have shortness of breath  Seek care immediately if:   · You are confused and very drowsy  Call your doctor or nephrologist if:   · You suddenly gain or lose more weight than your healthcare provider has told you is okay  · You have itchy skin or a rash  · You urinate more or less than you normally do  · You have blood in your urine  · You have nausea and are vomiting  · You have fatigue or muscle weakness  · You have hiccups that will not stop  · You have questions or concerns about your condition or care  How CKD is diagnosed:  CKD has 5 stages  Your healthcare provider will use results from the following tests to find the stage of CKD you have:  · Blood and urine tests  show how well your kidneys are working  They may also show the cause of your CKD  · Ultrasound, CT scan, or MRI  pictures may be used to check your kidneys  You may be given contrast liquid to help your kidneys show up better in the pictures  Tell the healthcare provider if you have ever had an allergic reaction to contrast liquid  Do not enter the MRI room with anything metal  Metal can cause serious injury  Tell the healthcare provider if you have any metal in or on your body      · A biopsy  is a procedure to remove a small piece of tissue from your kidney  It is done to find the cause of your CKD  Treatment  can help control signs and symptoms, and prevent a worse stage of CKD  Your care team may include specialists, such as a dietitian or a heart specialist  This depends on the stage of your CKD and if you have other health conditions to manage  Healthcare providers will work with you to create a plan based on your decisions for treatment  Your treatment plan may include any of the following:  · Medicines  may be given to decrease your blood pressure and get rid of extra fluid  You may also receive medicine to manage health conditions that may occur with CKD, such as anemia, diabetes, and heart disease  · Dialysis  is a treatment to remove chemicals and waste from your blood when your kidneys can no longer do this  · Surgery  may be needed to create an arteriovenous fistula (AVF) in your arm or insert a catheter into your abdomen  This is done so you can receive dialysis  · A kidney transplant  may be done if your CKD becomes severe  What you can do to manage CKD: Management may include making some lifestyle changes  Tell your healthcare provider if you have any concerns about being able to make changes  He or she can help you find solutions, including working with specialists  Ask for help creating a plan to break large goals into smaller steps  Your plan may include any of the following:  · Manage other health conditions  Your healthcare provider will work with you to make a care plan that meets your needs  You will be checked regularly for heart disease or other conditions that can make CKD worse, such as diabetes  Your blood pressure will be closely monitored  You will also get a target blood pressure and help making a plan to reach your target  This may include taking your blood pressure at home  · Maintain a healthy weight  Your weight and body mass index (BMI) will be checked regularly   BMI helps find if your weight is healthy for your height  Your healthcare provider will use other tests to check your muscle and protein levels  Extra weight can strain your kidneys  A low weight or low muscle mass can make you feel more tired  You may have trouble doing your daily activities  Ask your provider what a healthy weight is for you  He or she can help you create a plan to lose or gain weight safely, if needed  The plan may include keeping a food diary  This is a list of foods and liquids you have each day  Your provider will use the diary to help you make changes, if needed  Changes are based on your health and any other conditions you have, such as diabetes  · Create an exercise plan  Regular exercise can help you manage CKD, high blood pressure, and diabetes  Exercise also helps control weight  Your provider can help you create exercise goals and a plan to reach those goals  For example, your goal may be to exercise for 30 minutes in a day  Your plan can include breaking exercise into 10 minute sessions, 3 times during the day  · Create a healthy eating plan  Your provider may tell you to eat food low in potassium, phosphorus, or protein  Your provider may also recommend vitamin or mineral supplements  Do not take any supplements without talking to your provider  A dietitian can help you plan meals if needed  Ask how much liquid to drink each day and which liquids are best for you  · Limit sodium (salt) as directed  You may need to limit sodium to less than 2,300 milligrams (mg) each day  Ask your dietitian or healthcare provider how much sodium you can have each day  The amount depends on your stage of kidney disease  Table salt, canned foods, soups, salted snacks, and processed meats, like deli meats and sausage, are high in sodium  Your provider or a dietitian can show you how to read food labels for sodium  · Limit alcohol as directed  Alcohol can cause fluid retention and can affect your kidneys   Ask how much alcohol is safe for you  A drink of alcohol is 12 ounces of beer, 5 ounces of wine, or 1½ ounces of liquor  · Do not smoke  Nicotine and other chemicals in cigarettes and cigars can cause kidney damage  Ask your provider for information if you currently smoke and need help to quit  E-cigarettes or smokeless tobacco still contain nicotine  Talk to your provider before you use these products  · Ask about over-the-counter medicines  Medicines such as NSAIDs and laxatives may harm your kidneys  Some cough and cold medicines can raise your blood pressure  Always ask if a medicine is safe before you take it  · Ask about vaccines you may need  CKD can increase your risk for infections such as pneumonia, influenza, and hepatitis  Vaccines lower your risk for infection  Your healthcare provider will tell you which vaccines you need and when to get them  Follow up with your doctor or nephrologist as directed: You will need to return for tests to monitor your kidney and nerve function, and your parathyroid hormone level  Your medicines may be changed, based on certain test results  Write down your questions so you remember to ask them during your visits  © Copyright [employer] 2021 Information is for End User's use only and may not be sold, redistributed or otherwise used for commercial purposes  All illustrations and images included in CareNotes® are the copyrighted property of [employer]  or Rachel Tracey  The above information is an  only  It is not intended as medical advice for individual conditions or treatments  Talk to your doctor, nurse or pharmacist before following any medical regimen to see if it is safe and effective for you

## 2022-01-31 NOTE — ASSESSMENT & PLAN NOTE
Lab Results   Component Value Date    EGFR 46 01/31/2022    EGFR 58 11/20/2021    EGFR 56 05/06/2021    CREATININE 1 59 (H) 01/31/2022    CREATININE 1 52 (H) 11/20/2021    CREATININE 1 55 (H) 05/06/2021   Renal function is quite stable at this point  Advised to continue what is doing  Likely etiology hypertensive nephrosclerosis  Patient is on ACE-inhibitor which I will continue     Asymptomatic and progressive nature of kidney disease discussed with the patient    Advised hydration and avoiding any nephrotoxic medication

## 2022-01-31 NOTE — ASSESSMENT & PLAN NOTE
Lab Results   Component Value Date    EGFR 46 01/31/2022    EGFR 58 11/20/2021    EGFR 56 05/06/2021    CREATININE 1 59 (H) 01/31/2022    CREATININE 1 52 (H) 11/20/2021    CREATININE 1 55 (H) 05/06/2021   PTH and phosphorus along with vitamin-D are within acceptable range and will continue to monitor

## 2022-01-31 NOTE — PROGRESS NOTES
NEPHROLOGY OFFICE FOLLOW UP  Kellee Cheek 59 y o  male MRN: [id number]    Encounter: [id number] 1/31/2022    REASON FOR VISIT: Kellee Cheek is a 59 y o  male who is here on 1/31/2022 for Chronic Kidney Disease and Follow-up    HPI:    Bharti Valdez came in today for follow-up of CKD and high blood pressure  He is doing well overall  Denies any complaint  He just had blood work done today     No chest pain no palpitation or shortness of breath     No nausea no vomiting no abdominal discomfort      REVIEW OF SYSTEMS:    Review of Systems   Constitutional: Negative for activity change and fatigue  HENT: Negative for congestion and ear discharge  Eyes: Negative for photophobia and pain  Respiratory: Negative for apnea and choking  Cardiovascular: Negative for chest pain and palpitations  Gastrointestinal: Negative for abdominal distention and blood in stool  Endocrine: Negative for heat intolerance and polyphagia  Genitourinary: Negative for flank pain and urgency  Musculoskeletal: Negative for neck pain and neck stiffness  Skin: Negative for color change and wound  Allergic/Immunologic: Negative for food allergies and immunocompromised state  Neurological: Negative for seizures and facial asymmetry  Hematological: Negative for adenopathy  Does not bruise/bleed easily  Psychiatric/Behavioral: Negative for self-injury and suicidal ideas  PAST MEDICAL HISTORY:  Past Medical History:   Diagnosis Date    Chronic kidney disease     Hypertension        PAST SURGICAL HISTORY:  History reviewed  No pertinent surgical history      SOCIAL HISTORY:  Social History     Substance and Sexual Activity   Alcohol Use Never     Social History     Substance and Sexual Activity   Drug Use Never     Social History     Tobacco Use   Smoking Status Never Smoker   Smokeless Tobacco Never Used       FAMILY HISTORY:  Family History   Problem Relation Age of Onset    No Known Problems Mother     No Known Problems Father     Cancer Sister     Cancer Brother     No Known Problems Brother     No Known Problems Brother        MEDICATIONS:    Current Outpatient Medications:     amLODIPine-benazepril (LOTREL) 10-40 MG per capsule, Take 1 capsule by mouth daily, Disp: 90 capsule, Rfl: 0    cloNIDine (CATAPRES-TTS-3) 0 3 mg/24 hr, 1 patch once a week  , Disp: , Rfl:     PHYSICAL EXAM:  Vitals:    01/31/22 1614   BP: 120/80   BP Location: Right arm   Patient Position: Sitting   Pulse: 62   Resp: 16   Temp: (!) 97 3 °F (36 3 °C)   TempSrc: Temporal   SpO2: 97%   Weight: 118 kg (259 lb 9 6 oz)   Height: 6' 1 5" (1 867 m)     Body mass index is 33 79 kg/m²  Physical Exam  Constitutional:       General: He is not in acute distress  Appearance: He is well-developed  He is obese  HENT:      Head: Normocephalic  Eyes:      General: No scleral icterus  Conjunctiva/sclera: Conjunctivae normal    Neck:      Vascular: No JVD  Cardiovascular:      Rate and Rhythm: Normal rate  Heart sounds: Normal heart sounds  Pulmonary:      Effort: Pulmonary effort is normal       Breath sounds: No wheezing  Abdominal:      Palpations: Abdomen is soft  Tenderness: There is no abdominal tenderness  Musculoskeletal:         General: Normal range of motion  Cervical back: Neck supple  Skin:     General: Skin is warm  Findings: No rash  Neurological:      Mental Status: He is alert and oriented to person, place, and time     Psychiatric:         Behavior: Behavior normal          LAB RESULTS:  Results for orders placed or performed in visit on 20/93/24   Basic metabolic panel   Result Value Ref Range    Sodium 140 136 - 145 mmol/L    Potassium 4 0 3 5 - 5 3 mmol/L    Chloride 104 100 - 108 mmol/L    CO2 31 21 - 32 mmol/L    ANION GAP 5 4 - 13 mmol/L    BUN 15 5 - 25 mg/dL    Creatinine 1 59 (H) 0 60 - 1 30 mg/dL    Glucose, Fasting 91 65 - 99 mg/dL    Calcium 9 0 8 3 - 10 1 mg/dL    eGFR 46 ml/min/1 73sq m   CBC and differential   Result Value Ref Range    WBC 4 81 4 31 - 10 16 Thousand/uL    RBC 5 03 3 88 - 5 62 Million/uL    Hemoglobin 14 7 12 0 - 17 0 g/dL    Hematocrit 46 3 36 5 - 49 3 %    MCV 92 82 - 98 fL    MCH 29 2 26 8 - 34 3 pg    MCHC 31 7 31 4 - 37 4 g/dL    RDW 13 9 11 6 - 15 1 %    MPV 10 2 8 9 - 12 7 fL    Platelets 214 013 - 895 Thousands/uL    nRBC 0 /100 WBCs    Neutrophils Relative 50 43 - 75 %    Immat GRANS % 0 0 - 2 %    Lymphocytes Relative 39 14 - 44 %    Monocytes Relative 9 4 - 12 %    Eosinophils Relative 2 0 - 6 %    Basophils Relative 0 0 - 1 %    Neutrophils Absolute 2 39 1 85 - 7 62 Thousands/µL    Immature Grans Absolute 0 01 0 00 - 0 20 Thousand/uL    Lymphocytes Absolute 1 86 0 60 - 4 47 Thousands/µL    Monocytes Absolute 0 43 0 17 - 1 22 Thousand/µL    Eosinophils Absolute 0 10 0 00 - 0 61 Thousand/µL    Basophils Absolute 0 02 0 00 - 0 10 Thousands/µL   Phosphorus   Result Value Ref Range    Phosphorus 3 1 2 7 - 4 5 mg/dL       ASSESSMENT and PLAN:      Stage 3a chronic kidney disease (HCC)  Lab Results   Component Value Date    EGFR 46 01/31/2022    EGFR 58 11/20/2021    EGFR 56 05/06/2021    CREATININE 1 59 (H) 01/31/2022    CREATININE 1 52 (H) 11/20/2021    CREATININE 1 55 (H) 05/06/2021   Renal function is quite stable at this point  Advised to continue what is doing  Likely etiology hypertensive nephrosclerosis  Patient is on ACE-inhibitor which I will continue     Asymptomatic and progressive nature of kidney disease discussed with the patient    Advised hydration and avoiding any nephrotoxic medication    Chronic kidney disease-mineral and bone disorder  Lab Results   Component Value Date    EGFR 46 01/31/2022    EGFR 58 11/20/2021    EGFR 56 05/06/2021    CREATININE 1 59 (H) 01/31/2022    CREATININE 1 52 (H) 11/20/2021    CREATININE 1 55 (H) 05/06/2021   PTH and phosphorus along with vitamin-D are within acceptable range and will continue to monitor    Essential hypertension  Blood pressure is quite reasonably well control  Will continue same medication        Everything discussed at length with the patient  I will see him back in 6 months  Advised to get kidney ultrasound which I ordered last visit  Will get blood and urine test before next visit      Portions of the record may have been created with voice recognition software  Occasional wrong word or "sound a like" substitutions may have occurred due to the inherent limitations of voice recognition software  Read the chart carefully and recognize, using context, where substitutions have occurred  If you have any questions, please contact the dictating provider

## 2022-02-10 DIAGNOSIS — I10 ESSENTIAL HYPERTENSION: ICD-10-CM

## 2022-02-10 RX ORDER — AMLODIPINE BESYLATE AND BENAZEPRIL HYDROCHLORIDE 10; 40 MG/1; MG/1
1 CAPSULE ORAL DAILY
Qty: 90 CAPSULE | Refills: 0 | Status: SHIPPED | OUTPATIENT
Start: 2022-02-10 | End: 2022-04-02 | Stop reason: ALTCHOICE

## 2022-02-12 ENCOUNTER — HOSPITAL ENCOUNTER (OUTPATIENT)
Dept: ULTRASOUND IMAGING | Facility: HOSPITAL | Age: 57
Discharge: HOME | End: 2022-02-12
Attending: INTERNAL MEDICINE
Payer: COMMERCIAL

## 2022-02-12 DIAGNOSIS — N18.31 STAGE 3A CHRONIC KIDNEY DISEASE (HCC): ICD-10-CM

## 2022-02-12 DIAGNOSIS — I10 ESSENTIAL HYPERTENSION: ICD-10-CM

## 2022-02-12 PROCEDURE — 76770 US EXAM ABDO BACK WALL COMP: CPT

## 2022-02-13 ENCOUNTER — PATIENT MESSAGE (OUTPATIENT)
Dept: INTERNAL MEDICINE CLINIC | Facility: CLINIC | Age: 57
End: 2022-02-13

## 2022-02-13 DIAGNOSIS — I10 ESSENTIAL HYPERTENSION: Primary | ICD-10-CM

## 2022-02-14 RX ORDER — CLONIDINE 0.3 MG/24H
1 PATCH, EXTENDED RELEASE TRANSDERMAL WEEKLY
Qty: 4 PATCH | Refills: 0 | Status: SHIPPED | OUTPATIENT
Start: 2022-02-14 | End: 2022-03-08 | Stop reason: SDUPTHER

## 2022-03-11 DIAGNOSIS — I10 ESSENTIAL HYPERTENSION: ICD-10-CM

## 2022-03-11 RX ORDER — CLONIDINE 0.3 MG/24H
1 PATCH, EXTENDED RELEASE TRANSDERMAL WEEKLY
Qty: 12 PATCH | Refills: 3 | Status: SHIPPED | OUTPATIENT
Start: 2022-03-11

## 2022-03-15 ENCOUNTER — OFFICE VISIT (OUTPATIENT)
Dept: GASTROENTEROLOGY | Facility: CLINIC | Age: 57
End: 2022-03-15
Payer: COMMERCIAL

## 2022-03-15 VITALS
DIASTOLIC BLOOD PRESSURE: 62 MMHG | BODY MASS INDEX: 32.47 KG/M2 | SYSTOLIC BLOOD PRESSURE: 126 MMHG | WEIGHT: 253 LBS | HEIGHT: 74 IN | HEART RATE: 80 BPM

## 2022-03-15 DIAGNOSIS — Z86.010 HISTORY OF COLONIC POLYPS: Primary | ICD-10-CM

## 2022-03-15 PROBLEM — Z86.0100 HISTORY OF COLONIC POLYPS: Status: ACTIVE | Noted: 2022-03-15

## 2022-03-15 PROCEDURE — 99204 OFFICE O/P NEW MOD 45 MIN: CPT | Performed by: INTERNAL MEDICINE

## 2022-03-15 PROCEDURE — 3008F BODY MASS INDEX DOCD: CPT | Performed by: INTERNAL MEDICINE

## 2022-03-15 PROCEDURE — 1036F TOBACCO NON-USER: CPT | Performed by: INTERNAL MEDICINE

## 2022-03-15 PROCEDURE — 3074F SYST BP LT 130 MM HG: CPT | Performed by: INTERNAL MEDICINE

## 2022-03-15 PROCEDURE — 3078F DIAST BP <80 MM HG: CPT | Performed by: INTERNAL MEDICINE

## 2022-03-15 RX ORDER — POLYETHYLENE GLYCOL 3350, SODIUM CHLORIDE, SODIUM BICARBONATE, POTASSIUM CHLORIDE 420; 11.2; 5.72; 1.48 G/4L; G/4L; G/4L; G/4L
4000 POWDER, FOR SOLUTION ORAL ONCE
Qty: 4000 ML | Refills: 0 | Status: SHIPPED | OUTPATIENT
Start: 2022-03-15 | End: 2022-04-02 | Stop reason: ALTCHOICE

## 2022-03-15 NOTE — H&P (VIEW-ONLY)
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  He has no diarrhea constipation fevers chills nausea vomiting  He has no weight loss  Nobody in the family had colon cancer  He had no preparation or sedation issues last time  He has CKD stage 3 and has to have a nulightly prep  REVIEW OF SYSTEMS:     CONSTITUTIONAL: Denies any fever, chills, or rigors  Good appetite, and no recent weight loss  HEENT: No earache or tinnitus  Denies hearing loss or visual disturbances  CARDIOVASCULAR: No chest pain or palpitations  RESPIRATORY: Denies any cough, hemoptysis, shortness of breath or dyspnea on exertion  GASTROINTESTINAL: As noted in the History of Present Illness  GENITOURINARY: No problems with urination  Denies any hematuria or dysuria  NEUROLOGIC: No dizziness or vertigo, denies headaches  MUSCULOSKELETAL: Denies any muscle or joint pain  SKIN: Denies skin rashes or itching  ENDOCRINE: Denies excessive thirst  Denies intolerance to heat or cold  PSYCHOSOCIAL: Denies depression or anxiety  Denies any recent memory loss  Past Medical History:   Diagnosis Date    Chronic kidney disease     Hypertension       History reviewed  No pertinent surgical history    Social History     Socioeconomic History    Marital status: /Civil Union     Spouse name: Not on file    Number of children: Not on file  Years of education: Not on file    Highest education level: Not on file   Occupational History    Not on file   Tobacco Use    Smoking status: Never Smoker    Smokeless tobacco: Never Used   Substance and Sexual Activity    Alcohol use: Yes     Comment: rare    Drug use: Never    Sexual activity: Not on file   Other Topics Concern    Not on file   Social History Narrative    Not on file     Social Determinants of Health     Financial Resource Strain: Not on file   Food Insecurity: Not on file   Transportation Needs: Not on file   Physical Activity: Not on file   Stress: Not on file   Social Connections: Not on file   Intimate Partner Violence: Not on file   Housing Stability: Not on file     Family History   Problem Relation Age of Onset    No Known Problems Mother     No Known Problems Father     Cancer Sister     Cancer Brother     No Known Problems Brother     No Known Problems Brother      Patient has no known allergies  Current Outpatient Medications   Medication Sig Dispense Refill    amLODIPine-benazepril (LOTREL) 10-40 MG per capsule Take 1 capsule by mouth daily 90 capsule 0    cloNIDine (CATAPRES-TTS-3) 0 3 mg/24 hr Place 1 patch (0 3 mg total) on the skin once a week 12 patch 3    polyethylene glycol-electrolytes (NULYTELY) 4000 mL solution Take 4,000 mL by mouth once for 1 dose 4000 mL 0     No current facility-administered medications for this visit  Blood pressure 126/62, pulse 80, height 6' 2" (1 88 m), weight 115 kg (253 lb)      PHYSICAL EXAM:     General Appearance:   Alert, cooperative, no distress, appears stated age    HEENT:   Normocephalic, atraumatic, anicteric      Neck:  Supple, symmetrical, trachea midline, no adenopathy;    thyroid: no enlargement/tenderness/nodules; no carotid  bruit or JVD    Lungs:   Clear to auscultation bilaterally; no rales, rhonchi or wheezing; respirations unlabored    Heart[de-identified]   S1 and S2 normal; regular rate and rhythm; no murmur, rub, or gallop     Abdomen:   Soft, non-tender, non-distended; normal bowel sounds; no masses, no organomegaly    Genitalia:   Deferred    Rectal:   Deferred    Extremities:  No cyanosis, clubbing or edema    Pulses:  2+ and symmetric all extremities    Skin:  Skin color, texture, turgor normal, no rashes or lesions    Lymph nodes:  No palpable cervical, axillary or inguinal lymphadenopathy        Lab Results   Component Value Date    WBC 4 81 01/31/2022    HGB 14 7 01/31/2022    HCT 46 3 01/31/2022    MCV 92 01/31/2022     01/31/2022     Lab Results   Component Value Date    CALCIUM 9 0 01/31/2022    K 4 0 01/31/2022    CO2 31 01/31/2022     01/31/2022    BUN 15 01/31/2022    CREATININE 1 59 (H) 01/31/2022     Lab Results   Component Value Date    ALT 49 11/20/2021    AST 24 11/20/2021    ALKPHOS 75 11/20/2021     No results found for: INR, PROTIME

## 2022-03-15 NOTE — PATIENT INSTRUCTIONS
Scheduled date of colonoscopy (as of today):4/2/22  Physician performing colonoscopy: Haile  Location of colonoscopy:[place]  Bowel prep reviewed with patient:Danni  Instructions reviewed with patient by:Quita HOPKINS  Clearances:  none

## 2022-03-15 NOTE — PROGRESS NOTES
Consultation - [facility]  Che Barreto 1965 59 y o  male     ASSESSMENT @ PLAN:   He is a 57-year-old male with a history of 2 colon polyps on colonoscopy 5 years ago in [facility]  He has no symptoms and no family history of colon cancer  1 will do colonoscopy to investigate    Chief Complaint:  History of colon polyp    HPI:  He is a 57-year-old male with a history of colon polyps who is due for colonoscopy  He had 2 colon polyps removed on a colonoscopy 5 years ago in [place]  He has had episodes of internal hemorrhoid bleeding he  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  He has no diarrhea constipation fevers chills nausea vomiting  He has no weight loss  Nobody in the family had colon cancer  He had no preparation or sedation issues last time  He has CKD stage 3 and has to have a nulightly prep  REVIEW OF SYSTEMS:     CONSTITUTIONAL: Denies any fever, chills, or rigors  Good appetite, and no recent weight loss  HEENT: No earache or tinnitus  Denies hearing loss or visual disturbances  CARDIOVASCULAR: No chest pain or palpitations  RESPIRATORY: Denies any cough, hemoptysis, shortness of breath or dyspnea on exertion  GASTROINTESTINAL: As noted in the History of Present Illness  GENITOURINARY: No problems with urination  Denies any hematuria or dysuria  NEUROLOGIC: No dizziness or vertigo, denies headaches  MUSCULOSKELETAL: Denies any muscle or joint pain  SKIN: Denies skin rashes or itching  ENDOCRINE: Denies excessive thirst  Denies intolerance to heat or cold  PSYCHOSOCIAL: Denies depression or anxiety  Denies any recent memory loss  Past Medical History:   Diagnosis Date    Chronic kidney disease     Hypertension       History reviewed  No pertinent surgical history    Social History     Socioeconomic History    Marital status: /Civil Union     Spouse name: Not on file    Number of children: Not on file  Years of education: Not on file    Highest education level: Not on file   Occupational History    Not on file   Tobacco Use    Smoking status: Never Smoker    Smokeless tobacco: Never Used   Substance and Sexual Activity    Alcohol use: Yes     Comment: rare    Drug use: Never    Sexual activity: Not on file   Other Topics Concern    Not on file   Social History Narrative    Not on file     Social Determinants of Health     Financial Resource Strain: Not on file   Food Insecurity: Not on file   Transportation Needs: Not on file   Physical Activity: Not on file   Stress: Not on file   Social Connections: Not on file   Intimate Partner Violence: Not on file   Housing Stability: Not on file     Family History   Problem Relation Age of Onset    No Known Problems Mother     No Known Problems Father     Cancer Sister     Cancer Brother     No Known Problems Brother     No Known Problems Brother      Patient has no known allergies  Current Outpatient Medications   Medication Sig Dispense Refill    amLODIPine-benazepril (LOTREL) 10-40 MG per capsule Take 1 capsule by mouth daily 90 capsule 0    cloNIDine (CATAPRES-TTS-3) 0 3 mg/24 hr Place 1 patch (0 3 mg total) on the skin once a week 12 patch 3    polyethylene glycol-electrolytes (NULYTELY) 4000 mL solution Take 4,000 mL by mouth once for 1 dose 4000 mL 0     No current facility-administered medications for this visit  Blood pressure 126/62, pulse 80, height 6' 2" (1 88 m), weight 115 kg (253 lb)      PHYSICAL EXAM:     General Appearance:   Alert, cooperative, no distress, appears stated age    HEENT:   Normocephalic, atraumatic, anicteric      Neck:  Supple, symmetrical, trachea midline, no adenopathy;    thyroid: no enlargement/tenderness/nodules; no carotid  bruit or JVD    Lungs:   Clear to auscultation bilaterally; no rales, rhonchi or wheezing; respirations unlabored    Heart[de-identified]   S1 and S2 normal; regular rate and rhythm; no murmur, rub, or gallop     Abdomen:   Soft, non-tender, non-distended; normal bowel sounds; no masses, no organomegaly    Genitalia:   Deferred    Rectal:   Deferred    Extremities:  No cyanosis, clubbing or edema    Pulses:  2+ and symmetric all extremities    Skin:  Skin color, texture, turgor normal, no rashes or lesions    Lymph nodes:  No palpable cervical, axillary or inguinal lymphadenopathy        Lab Results   Component Value Date    WBC 4 81 01/31/2022    HGB 14 7 01/31/2022    HCT 46 3 01/31/2022    MCV 92 01/31/2022     01/31/2022     Lab Results   Component Value Date    CALCIUM 9 0 01/31/2022    K 4 0 01/31/2022    CO2 31 01/31/2022     01/31/2022    BUN 15 01/31/2022    CREATININE 1 59 (H) 01/31/2022     Lab Results   Component Value Date    ALT 49 11/20/2021    AST 24 11/20/2021    ALKPHOS 75 11/20/2021     No results found for: INR, PROTIME

## 2022-04-02 ENCOUNTER — HOSPITAL ENCOUNTER (OUTPATIENT)
Dept: GASTROENTEROLOGY | Facility: HOSPITAL | Age: 57
Setting detail: OUTPATIENT SURGERY
Discharge: HOME | End: 2022-04-02
Attending: INTERNAL MEDICINE | Admitting: INTERNAL MEDICINE
Payer: COMMERCIAL

## 2022-04-02 ENCOUNTER — ANESTHESIA (OUTPATIENT)
Dept: GASTROENTEROLOGY | Facility: HOSPITAL | Age: 57
End: 2022-04-02

## 2022-04-02 ENCOUNTER — ANESTHESIA EVENT (OUTPATIENT)
Dept: GASTROENTEROLOGY | Facility: HOSPITAL | Age: 57
End: 2022-04-02

## 2022-04-02 VITALS
SYSTOLIC BLOOD PRESSURE: 123 MMHG | DIASTOLIC BLOOD PRESSURE: 82 MMHG | RESPIRATION RATE: 16 BRPM | TEMPERATURE: 97.9 F | OXYGEN SATURATION: 98 % | HEART RATE: 57 BPM | BODY MASS INDEX: 33.1 KG/M2 | HEIGHT: 73 IN | WEIGHT: 249.78 LBS

## 2022-04-02 DIAGNOSIS — Z86.010 HISTORY OF COLONIC POLYPS: ICD-10-CM

## 2022-04-02 PROCEDURE — 88305 TISSUE EXAM BY PATHOLOGIST: CPT | Performed by: PATHOLOGY

## 2022-04-02 PROCEDURE — 45385 COLONOSCOPY W/LESION REMOVAL: CPT | Performed by: INTERNAL MEDICINE

## 2022-04-02 RX ORDER — PROPOFOL 10 MG/ML
INJECTION, EMULSION INTRAVENOUS AS NEEDED
Status: DISCONTINUED | OUTPATIENT
Start: 2022-04-02 | End: 2022-04-02

## 2022-04-02 RX ORDER — AMLODIPINE BESYLATE AND BENAZEPRIL HYDROCHLORIDE 10; 40 MG/1; MG/1
1 CAPSULE ORAL DAILY
COMMUNITY

## 2022-04-02 RX ORDER — CHLORAL HYDRATE 500 MG
1000 CAPSULE ORAL DAILY
COMMUNITY

## 2022-04-02 RX ORDER — LIDOCAINE HYDROCHLORIDE 10 MG/ML
0.5 INJECTION, SOLUTION EPIDURAL; INFILTRATION; INTRACAUDAL; PERINEURAL ONCE AS NEEDED
Status: DISCONTINUED | OUTPATIENT
Start: 2022-04-02 | End: 2022-04-06 | Stop reason: HOSPADM

## 2022-04-02 RX ORDER — SODIUM CHLORIDE, SODIUM LACTATE, POTASSIUM CHLORIDE, CALCIUM CHLORIDE 600; 310; 30; 20 MG/100ML; MG/100ML; MG/100ML; MG/100ML
50 INJECTION, SOLUTION INTRAVENOUS CONTINUOUS
Status: DISCONTINUED | OUTPATIENT
Start: 2022-04-02 | End: 2022-04-06 | Stop reason: HOSPADM

## 2022-04-02 RX ORDER — LIDOCAINE HYDROCHLORIDE 20 MG/ML
INJECTION, SOLUTION EPIDURAL; INFILTRATION; INTRACAUDAL; PERINEURAL AS NEEDED
Status: DISCONTINUED | OUTPATIENT
Start: 2022-04-02 | End: 2022-04-02

## 2022-04-02 RX ORDER — DIPHENOXYLATE HYDROCHLORIDE AND ATROPINE SULFATE 2.5; .025 MG/1; MG/1
1 TABLET ORAL DAILY
COMMUNITY

## 2022-04-02 RX ADMIN — PROPOFOL 35 MG: 10 INJECTION, EMULSION INTRAVENOUS at 09:41

## 2022-04-02 RX ADMIN — PROPOFOL 40 MG: 10 INJECTION, EMULSION INTRAVENOUS at 09:53

## 2022-04-02 RX ADMIN — PROPOFOL 40 MG: 10 INJECTION, EMULSION INTRAVENOUS at 09:50

## 2022-04-02 RX ADMIN — PROPOFOL 75 MG: 10 INJECTION, EMULSION INTRAVENOUS at 09:39

## 2022-04-02 RX ADMIN — PROPOFOL 40 MG: 10 INJECTION, EMULSION INTRAVENOUS at 09:44

## 2022-04-02 RX ADMIN — PROPOFOL 40 MG: 10 INJECTION, EMULSION INTRAVENOUS at 09:47

## 2022-04-02 RX ADMIN — PROPOFOL 65 MG: 10 INJECTION, EMULSION INTRAVENOUS at 09:38

## 2022-04-02 RX ADMIN — LIDOCAINE HYDROCHLORIDE 100 MG: 20 INJECTION, SOLUTION EPIDURAL; INFILTRATION; INTRACAUDAL; PERINEURAL at 09:37

## 2022-04-02 RX ADMIN — SODIUM CHLORIDE, SODIUM LACTATE, POTASSIUM CHLORIDE, AND CALCIUM CHLORIDE 50 ML/HR: .6; .31; .03; .02 INJECTION, SOLUTION INTRAVENOUS at 09:32

## 2022-04-02 NOTE — INTERVAL H&P NOTE
H&P reviewed  After examining the patient I find no changes in the patients condition since the H&P had been written      Vitals:    04/02/22 0912   BP: 135/85   Pulse: 62   Resp: 16   Temp: 97 6 °F (36 4 °C)   SpO2: 97%

## 2022-04-02 NOTE — ANESTHESIA PREPROCEDURE EVALUATION
Procedure:  COLONOSCOPY    Relevant Problems   CARDIO   (+) Essential hypertension      /RENAL   (+) Chronic kidney disease-mineral and bone disorder   (+) Stage 3a chronic kidney disease (HCC)      NEURO/PSYCH   (+) History of colonic polyps        Physical Exam    Airway    Mallampati score: II  TM Distance: >3 FB  Neck ROM: full     Dental   No notable dental hx     Cardiovascular  Cardiovascular exam normal    Pulmonary  Pulmonary exam normal Breath sounds clear to auscultation,     Other Findings        Anesthesia Plan  ASA Score- 2     Anesthesia Type- IV sedation with anesthesia with ASA Monitors  Additional Monitors:   Airway Plan:           Plan Factors-    Chart reviewed  EKG reviewed  Imaging results reviewed  Existing labs reviewed  Patient summary reviewed  Patient is not a current smoker  Patient instructed to abstain from smoking on day of procedure  Patient did not smoke on day of surgery  Obstructive sleep apnea risk education given perioperatively  Induction- intravenous  Postoperative Plan-     Informed Consent- Anesthetic plan and risks discussed with patient  I personally reviewed this patient with the CRNA  Discussed and agreed on the Anesthesia Plan with the CRNA             Lab Results   Component Value Date    WBC 4 81 01/31/2022    HGB 14 7 01/31/2022    HCT 46 3 01/31/2022    MCV 92 01/31/2022     01/31/2022     Lab Results   Component Value Date    CALCIUM 9 0 01/31/2022    K 4 0 01/31/2022    CO2 31 01/31/2022     01/31/2022    BUN 15 01/31/2022    CREATININE 1 59 (H) 01/31/2022           Discussed with pt the benefits/alternatives and risks or General Anesthesia including breathing tube remaining in place if not strong enough, PONV, damage to lips and teeth, sore throat, eye injury or blindness    I, Dr Linh Sadler, the attending physician, have personally seen and evaluated the patient prior to anesthetic care   I have reviewed the pre-anesthetic record, and other medical records if appropriate to the anesthetic care  If a CRNA is involved in the case, I have reviewed the CRNA assessment, if present, and agree  The patient is in a suitable condition to proceed with my formulated anesthetic plan

## 2022-04-28 ENCOUNTER — TELEPHONE (OUTPATIENT)
Dept: NEPHROLOGY | Facility: CLINIC | Age: 57
End: 2022-04-28

## 2022-05-03 ENCOUNTER — RA CDI HCC (OUTPATIENT)
Dept: OTHER | Facility: HOSPITAL | Age: 57
End: 2022-05-03

## 2022-05-03 NOTE — PROGRESS NOTES
[facility]  coding opportunities       Chart reviewed, no opportunity found: CHART REVIEWED, NO OPPORTUNITY FOUND        Patients Insurance        Commercial Insurance: [employer]

## 2022-05-11 ENCOUNTER — HOSPITAL ENCOUNTER (EMERGENCY)
Facility: HOSPITAL | Age: 57
Discharge: HOME | End: 2022-05-11
Attending: EMERGENCY MEDICINE | Admitting: EMERGENCY MEDICINE
Payer: COMMERCIAL

## 2022-05-11 VITALS
RESPIRATION RATE: 20 BRPM | OXYGEN SATURATION: 96 % | SYSTOLIC BLOOD PRESSURE: 133 MMHG | TEMPERATURE: 98.1 F | HEART RATE: 62 BPM | DIASTOLIC BLOOD PRESSURE: 78 MMHG

## 2022-05-11 DIAGNOSIS — R05.9 COUGH: Primary | ICD-10-CM

## 2022-05-11 LAB
FLUAV RNA RESP QL NAA+PROBE: NEGATIVE
FLUBV RNA RESP QL NAA+PROBE: NEGATIVE
RSV RNA RESP QL NAA+PROBE: NEGATIVE
SARS-COV-2 RNA RESP QL NAA+PROBE: POSITIVE

## 2022-05-11 PROCEDURE — 99283 EMERGENCY DEPT VISIT LOW MDM: CPT

## 2022-05-11 PROCEDURE — 0241U HB NFCT DS VIR RESP RNA 4 TRGT: CPT | Performed by: EMERGENCY MEDICINE

## 2022-05-11 PROCEDURE — 99291 CRITICAL CARE FIRST HOUR: CPT | Performed by: EMERGENCY MEDICINE

## 2022-05-11 NOTE — ED PROVIDER NOTES
History  Chief Complaint   Patient presents with    Cough     Pt reports cough for two days  OTC medications provided little relief  47-year-old male, presents with 2 days of cough  Patient reports mild, nonproductive cough for the past 2 days with no known modifying factors  Reports nasal congestion, denies any fevers or shortness of breath  History provided by:  Patient   used: No    Cough  Associated symptoms: no fever        Prior to Admission Medications   Prescriptions Last Dose Informant Patient Reported? Taking? Omega-3 Fatty Acids (fish oil) 1,000 mg   Yes No   Sig: Take 1,000 mg by mouth daily   amLODIPine-benazepril (LOTREL) 10-40 MG per capsule   Yes No   Sig: Take 1 capsule by mouth daily   cloNIDine (CATAPRES-TTS-3) 0 3 mg/24 hr   No No   Sig: Place 1 patch (0 3 mg total) on the skin once a week   multivitamin (THERAGRAN) TABS   Yes No   Sig: Take 1 tablet by mouth daily      Facility-Administered Medications: None       Past Medical History:   Diagnosis Date    Chronic kidney disease     Colon polyp     Hypertension        Past Surgical History:   Procedure Laterality Date    COLONOSCOPY         Family History   Problem Relation Age of Onset    No Known Problems Mother     No Known Problems Father     Cancer Sister     Cancer Brother     No Known Problems Brother     No Known Problems Brother      I have reviewed and agree with the history as documented  E-Cigarette/Vaping    E-Cigarette Use Never User      E-Cigarette/Vaping Substances     Social History     Tobacco Use    Smoking status: Never Smoker    Smokeless tobacco: Never Used   Vaping Use    Vaping Use: Never used   Substance Use Topics    Alcohol use: Yes     Comment: Once a week    Drug use: Never       Review of Systems   Constitutional: Negative  Negative for fever  HENT: Positive for congestion  Eyes: Negative  Respiratory: Positive for cough  Cardiovascular: Negative  Gastrointestinal: Negative  Neurological: Negative  Physical Exam  Physical Exam  Vitals and nursing note reviewed  Constitutional:       General: He is not in acute distress  HENT:      Head: Normocephalic and atraumatic  Mouth/Throat:      Mouth: Mucous membranes are moist       Pharynx: Oropharynx is clear  No oropharyngeal exudate or posterior oropharyngeal erythema  Eyes:      Extraocular Movements: Extraocular movements intact  Pupils: Pupils are equal, round, and reactive to light  Cardiovascular:      Rate and Rhythm: Normal rate and regular rhythm  Pulmonary:      Effort: Pulmonary effort is normal       Breath sounds: Normal breath sounds  No wheezing  Abdominal:      Palpations: Abdomen is soft  Tenderness: There is no abdominal tenderness  Musculoskeletal:         General: Normal range of motion  Skin:     General: Skin is warm and dry  Neurological:      General: No focal deficit present  Mental Status: He is alert and oriented to person, place, and time  Vital Signs  ED Triage Vitals [05/11/22 1040]   Temperature Pulse Respirations Blood Pressure SpO2   98 1 °F (36 7 °C) 62 20 133/78 96 %      Temp Source Heart Rate Source Patient Position - Orthostatic VS BP Location FiO2 (%)   Oral Monitor Sitting Right arm --      Pain Score       --           Vitals:    05/11/22 1040   BP: 133/78   Pulse: 62   Patient Position - Orthostatic VS: Sitting         Visual Acuity      ED Medications  Medications - No data to display    Diagnostic Studies  Results Reviewed     Procedure Component Value Units Date/Time    COVID/FLU/RSV - 2 hour TAT [[id number]] Collected: 05/11/22 1130    Lab Status:  In process Specimen: Nares from Nose Updated: 05/11/22 1133                 No orders to display              Procedures  Procedures         ED Course                               SBIRT 20yo+    Flowsheet Row Most Recent Value   SBIRT (23 yo +)    In order to provide better care to our patients, we are screening all of our patients for alcohol and drug use  Would it be okay to ask you these screening questions? Yes Filed at: 05/11/2022 1056   Initial Alcohol Screen: US AUDIT-C     1  How often do you have a drink containing alcohol? 0 Filed at: 05/11/2022 1056   2  How many drinks containing alcohol do you have on a typical day you are drinking? 0 Filed at: 05/11/2022 1056   3a  Male UNDER 65: How often do you have five or more drinks on one occasion? 0 Filed at: 05/11/2022 1056   3b  FEMALE Any Age, or MALE 65+: How often do you have 4 or more drinks on one occassion? 0 Filed at: 05/11/2022 1056   Audit-C Score 0 Filed at: 05/11/2022 1056   SHINE: How many times in the past year have you    Used an illegal drug or used a prescription medication for non-medical reasons? 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  Patient's other follow-up return for any worsening or new concerning symptoms        Disposition  Final diagnoses:   Cough     Time reflects when diagnosis was documented in both MDM as applicable and the Disposition within this note     Time User Action Codes Description Comment    5/11/2022 11:18 AM Shaggy Alex Add [R05 [id number]] Cough       ED Disposition     ED Disposition   Discharge    Condition   Stable    Date/Time   Wed May 11, 2022 11:18 AM    Comment   Al Marie Welcome discharge to home/self care                Follow-up Information    None         Discharge Medication List as of 5/11/2022 11:21 AM      CONTINUE these medications which have NOT CHANGED    Details   amLODIPine-benazepril (LOTREL) 10-40 MG per capsule Take 1 capsule by mouth daily, Historical Med      cloNIDine (CATAPRES-TTS-3) 0 3 mg/24 hr Place 1 patch (0 3 mg total) on the skin once a week, Starting Fri 3/11/2022, Normal      multivitamin (THERAGRAN) TABS Take 1 tablet by mouth daily, Historical Med      Omega-3 Fatty Acids (fish oil) 1,000 mg Take 1,000 mg by mouth daily, Historical Med             No discharge procedures on file      PDMP Review     None          ED Provider  Electronically Signed by           Jerad Cuello MD  05/11/22 3598

## 2022-05-11 NOTE — DISCHARGE INSTRUCTIONS
As discussed, start using allergy nasal spray such as Flonase daily  You may use nasal decongestant spray such as Afrin for the next day or 2

## 2022-05-11 NOTE — Clinical Note
Kar Jackman was seen and treated in our emergency department on 5/11/2022  No restrictions            Diagnosis:     Nikolay Mobley  may return to work on return date  He may return on this date: 05/12/2022    May return to work if COVID-19 test negative  If you have any questions or concerns, please don't hesitate to call        Sandi Schulz MD    ______________________________           _______________          _______________  Hospital Representative                              Date                                Time

## 2022-05-11 NOTE — Clinical Note
Kenji Dahl was seen and treated in our emergency department on 5/11/2022  No restrictions            Diagnosis:     Krystina Hollis  may return to work on return date  He may return on this date: 05/12/2022    May return to work if COVID-19 test negative  If you have any questions or concerns, please don't hesitate to call        Shanda Rodríguze MD    ______________________________           _______________          _______________  Hospital Representative                              Date                                Time

## 2022-05-18 ENCOUNTER — OFFICE VISIT (OUTPATIENT)
Dept: INTERNAL MEDICINE CLINIC | Facility: CLINIC | Age: 57
End: 2022-05-18
Payer: COMMERCIAL

## 2022-05-18 VITALS
TEMPERATURE: 98.6 F | RESPIRATION RATE: 16 BRPM | BODY MASS INDEX: 33 KG/M2 | HEART RATE: 98 BPM | OXYGEN SATURATION: 99 % | DIASTOLIC BLOOD PRESSURE: 82 MMHG | SYSTOLIC BLOOD PRESSURE: 130 MMHG | WEIGHT: 249 LBS | HEIGHT: 73 IN

## 2022-05-18 DIAGNOSIS — N18.31 STAGE 3A CHRONIC KIDNEY DISEASE (HCC): ICD-10-CM

## 2022-05-18 DIAGNOSIS — Z12.5 PROSTATE CANCER SCREENING: ICD-10-CM

## 2022-05-18 DIAGNOSIS — R06.00 DOE (DYSPNEA ON EXERTION): ICD-10-CM

## 2022-05-18 DIAGNOSIS — R73.01 IMPAIRED FASTING GLUCOSE: ICD-10-CM

## 2022-05-18 DIAGNOSIS — Z13.220 ENCOUNTER FOR LIPID SCREENING FOR CARDIOVASCULAR DISEASE: ICD-10-CM

## 2022-05-18 DIAGNOSIS — M54.16 LUMBAR RADICULOPATHY: ICD-10-CM

## 2022-05-18 DIAGNOSIS — Z13.6 ENCOUNTER FOR LIPID SCREENING FOR CARDIOVASCULAR DISEASE: ICD-10-CM

## 2022-05-18 DIAGNOSIS — I10 ESSENTIAL HYPERTENSION: Primary | ICD-10-CM

## 2022-05-18 PROCEDURE — 3079F DIAST BP 80-89 MM HG: CPT | Performed by: INTERNAL MEDICINE

## 2022-05-18 PROCEDURE — 3725F SCREEN DEPRESSION PERFORMED: CPT | Performed by: INTERNAL MEDICINE

## 2022-05-18 PROCEDURE — 1036F TOBACCO NON-USER: CPT | Performed by: INTERNAL MEDICINE

## 2022-05-18 PROCEDURE — 3075F SYST BP GE 130 - 139MM HG: CPT | Performed by: INTERNAL MEDICINE

## 2022-05-18 PROCEDURE — 99214 OFFICE O/P EST MOD 30 MIN: CPT | Performed by: INTERNAL MEDICINE

## 2022-05-18 PROCEDURE — 3008F BODY MASS INDEX DOCD: CPT | Performed by: INTERNAL MEDICINE

## 2022-05-18 NOTE — PROGRESS NOTES
Assessment/Plan:     Jose Yip is here for routine follow up  Reports SHEPPARD especially when he is mowing his lawns  Denies any cp, sob, palpitations, orthopnea, pnd  He had a stress test in the past followed by nuclear study  Will order stress test  Recently tested + for COVID and is asymptomatic  He follows with nephrology for his CKD  Avoid nephrotoxins  BP is stable  Obtain labs  LBP with radiculopathy  Obtain xray; consider PT, acupuncture, and chiropractor  Quality Measures:     BMI Counseling: Body mass index is 32 85 kg/m²  The BMI is above normal  Nutrition recommendations include decreasing portion sizes and encouraging healthy choices of fruits and vegetables  Exercise recommendations include moderate physical activity 150 minutes/week  Rationale for BMI follow-up plan is due to patient being overweight or obese  Depression Screening and Follow-up Plan: Patient was screened for depression during today's encounter  They screened negative with a PHQ-2 score of 0  Return in about 6 months (around 11/18/2022)  No problem-specific Assessment & Plan notes found for this encounter  Diagnoses and all orders for this visit:    Essential hypertension  -     CBC and differential; Future  -     Comprehensive metabolic panel; Future  -     CBC and differential; Future  -     Comprehensive metabolic panel; Future  -     Stress test only, exercise; Future    Stage 3a chronic kidney disease ([facility] [place] [address] )    Encounter for lipid screening for cardiovascular disease  -     Lipid Panel with Direct LDL reflex; Future  -     Lipid Panel with Direct LDL reflex; Future    Prostate cancer screening  -     PSA, total and free; Future    Impaired fasting glucose  -     Hemoglobin A1C; Future    Lumbar radiculopathy  -     XR spine lumbar minimum 4 views non injury; Future    SHEPPARD (dyspnea on exertion)  -     Stress test only, exercise; Future          Subjective:      Patient ID: Eve Milla is a 59 y o  male     Pt is here for routine follow up        ALLERGIES:  No Known Allergies    CURRENT MEDICATIONS:    Current Outpatient Medications:     amLODIPine-benazepril (LOTREL) 10-40 MG per capsule, Take 1 capsule by mouth daily, Disp: , Rfl:     cloNIDine (CATAPRES-TTS-3) 0 3 mg/24 hr, Place 1 patch (0 3 mg total) on the skin once a week, Disp: 12 patch, Rfl: 3    multivitamin (THERAGRAN) TABS, Take 1 tablet by mouth daily, Disp: , Rfl:     Omega-3 Fatty Acids (fish oil) 1,000 mg, Take 1,000 mg by mouth daily, Disp: , Rfl:     ACTIVE PROBLEM LIST:  Patient Active Problem List   Diagnosis    Stage 3a chronic kidney disease ([facility] [place] [address] )    Essential hypertension    Chronic kidney disease-mineral and bone disorder    History of colonic polyps       PAST MEDICAL HISTORY:  Past Medical History:   Diagnosis Date    Chronic kidney disease     Colon polyp     Hypertension        PAST SURGICAL HISTORY:  Past Surgical History:   Procedure Laterality Date    COLONOSCOPY         FAMILY HISTORY:  Family History   Problem Relation Age of Onset    No Known Problems Mother     No Known Problems Father     Cancer Sister     Cancer Brother     No Known Problems Brother     No Known Problems Brother        SOCIAL HISTORY:  Social History     Socioeconomic History    Marital status: /Civil Union     Spouse name: Not on file    Number of children: Not on file    Years of education: Not on file    Highest education level: Not on file   Occupational History    Not on file   Tobacco Use    Smoking status: Never Smoker    Smokeless tobacco: Never Used   Vaping Use    Vaping Use: Never used   Substance and Sexual Activity    Alcohol use: Yes     Comment: Once a week    Drug use: Never    Sexual activity: Not on file   Other Topics Concern    Not on file   Social History Narrative    Not on file     Social Determinants of Health     Financial Resource Strain: Not on file   Food Insecurity: Not on file Transportation Needs: Not on file   Physical Activity: Not on file   Stress: Not on file   Social Connections: Not on file   Intimate Partner Violence: Not on file   Housing Stability: Not on file       Review of Systems   Musculoskeletal: Positive for back pain  All other systems reviewed and are negative  Objective:  Vitals:    05/18/22 1648   BP: 130/82   BP Location: Left arm   Patient Position: Sitting   Cuff Size: Standard   Pulse: 98   Resp: 16   Temp: 98 6 °F (37 °C)   TempSrc: Tympanic   SpO2: 99%   Weight: 113 kg (249 lb)   Height: 6' 1" (1 854 m)     Body mass index is 32 85 kg/m²  Physical Exam  Vitals and nursing note reviewed  Constitutional:       Appearance: Normal appearance  He is obese  HENT:      Head: Normocephalic and atraumatic  Nose: Nose normal       Mouth/Throat:      Mouth: Mucous membranes are moist       Pharynx: Oropharynx is clear  Cardiovascular:      Rate and Rhythm: Normal rate and regular rhythm  Heart sounds: Normal heart sounds  Pulmonary:      Effort: Pulmonary effort is normal       Breath sounds: Normal breath sounds  Musculoskeletal:         General: Normal range of motion  Cervical back: Normal range of motion  Right lower leg: No edema  Left lower leg: No edema  Skin:     General: Skin is warm and dry  Neurological:      General: No focal deficit present  Mental Status: He is alert and oriented to person, place, and time  Mental status is at baseline  Psychiatric:         Mood and Affect: Mood normal            RESULTS:    Recent Results (from the past 1008 hour(s))   COVID/FLU/RSV - 2 hour TAT    Collection Time: 05/11/22 11:30 AM    Specimen: Nose; Nares   Result Value Ref Range    SARS-CoV-2 Positive (A) Negative    INFLUENZA A PCR Negative Negative    INFLUENZA B PCR Negative Negative    RSV PCR Negative Negative       This note was created with voice recognition software    Phonic, grammatical and spelling errors may be present within the note as a result

## 2022-05-28 ENCOUNTER — APPOINTMENT (OUTPATIENT)
Dept: LAB | Facility: HOSPITAL | Age: 57
End: 2022-05-28
Payer: COMMERCIAL

## 2022-05-28 ENCOUNTER — HOSPITAL ENCOUNTER (OUTPATIENT)
Dept: RADIOLOGY | Facility: HOSPITAL | Age: 57
Discharge: HOME | End: 2022-05-28
Payer: COMMERCIAL

## 2022-05-28 DIAGNOSIS — R73.01 IMPAIRED FASTING GLUCOSE: ICD-10-CM

## 2022-05-28 DIAGNOSIS — Z13.220 ENCOUNTER FOR LIPID SCREENING FOR CARDIOVASCULAR DISEASE: ICD-10-CM

## 2022-05-28 DIAGNOSIS — I10 ESSENTIAL HYPERTENSION: ICD-10-CM

## 2022-05-28 DIAGNOSIS — Z12.5 PROSTATE CANCER SCREENING: ICD-10-CM

## 2022-05-28 DIAGNOSIS — Z13.6 ENCOUNTER FOR LIPID SCREENING FOR CARDIOVASCULAR DISEASE: ICD-10-CM

## 2022-05-28 DIAGNOSIS — M54.16 LUMBAR RADICULOPATHY: ICD-10-CM

## 2022-05-28 LAB
ALBUMIN SERPL BCP-MCNC: 3.7 G/DL (ref 3.5–5)
ALP SERPL-CCNC: 74 U/L (ref 46–116)
ALT SERPL W P-5'-P-CCNC: 61 U/L (ref 12–78)
ANION GAP SERPL CALCULATED.3IONS-SCNC: 11 MMOL/L (ref 4–13)
AST SERPL W P-5'-P-CCNC: 32 U/L (ref 5–45)
BASOPHILS # BLD AUTO: 0.03 THOUSANDS/ΜL (ref 0–0.1)
BASOPHILS NFR BLD AUTO: 1 % (ref 0–1)
BILIRUB SERPL-MCNC: 0.55 MG/DL (ref 0.2–1)
BUN SERPL-MCNC: 15 MG/DL (ref 5–25)
CALCIUM SERPL-MCNC: 8.8 MG/DL (ref 8.3–10.1)
CHLORIDE SERPL-SCNC: 103 MMOL/L (ref 100–108)
CHOLEST SERPL-MCNC: 151 MG/DL
CO2 SERPL-SCNC: 26 MMOL/L (ref 21–32)
CREAT SERPL-MCNC: 1.68 MG/DL (ref 0.6–1.3)
EOSINOPHIL # BLD AUTO: 0.27 THOUSAND/ΜL (ref 0–0.61)
EOSINOPHIL NFR BLD AUTO: 6 % (ref 0–6)
ERYTHROCYTE [DISTWIDTH] IN BLOOD BY AUTOMATED COUNT: 13.6 % (ref 11.6–15.1)
EST. AVERAGE GLUCOSE BLD GHB EST-MCNC: 128 MG/DL
GFR SERPL CREATININE-BSD FRML MDRD: 43 ML/MIN/1.73SQ M
GLUCOSE P FAST SERPL-MCNC: 153 MG/DL (ref 65–99)
HBA1C MFR BLD: 6.1 %
HCT VFR BLD AUTO: 45.4 % (ref 36.5–49.3)
HDLC SERPL-MCNC: 37 MG/DL
HGB BLD-MCNC: 14.6 G/DL (ref 12–17)
IMM GRANULOCYTES # BLD AUTO: 0.02 THOUSAND/UL (ref 0–0.2)
IMM GRANULOCYTES NFR BLD AUTO: 1 % (ref 0–2)
LDLC SERPL CALC-MCNC: 97 MG/DL (ref 0–100)
LYMPHOCYTES # BLD AUTO: 1.77 THOUSANDS/ΜL (ref 0.6–4.47)
LYMPHOCYTES NFR BLD AUTO: 40 % (ref 14–44)
MCH RBC QN AUTO: 29.7 PG (ref 26.8–34.3)
MCHC RBC AUTO-ENTMCNC: 32.2 G/DL (ref 31.4–37.4)
MCV RBC AUTO: 93 FL (ref 82–98)
MONOCYTES # BLD AUTO: 0.35 THOUSAND/ΜL (ref 0.17–1.22)
MONOCYTES NFR BLD AUTO: 8 % (ref 4–12)
NEUTROPHILS # BLD AUTO: 1.96 THOUSANDS/ΜL (ref 1.85–7.62)
NEUTS SEG NFR BLD AUTO: 44 % (ref 43–75)
NRBC BLD AUTO-RTO: 0 /100 WBCS
PLATELET # BLD AUTO: 226 THOUSANDS/UL (ref 149–390)
PMV BLD AUTO: 10.4 FL (ref 8.9–12.7)
POTASSIUM SERPL-SCNC: 4.3 MMOL/L (ref 3.5–5.3)
PROT SERPL-MCNC: 7.3 G/DL (ref 6.4–8.2)
RBC # BLD AUTO: 4.91 MILLION/UL (ref 3.88–5.62)
SODIUM SERPL-SCNC: 140 MMOL/L (ref 136–145)
TRIGL SERPL-MCNC: 87 MG/DL
WBC # BLD AUTO: 4.4 THOUSAND/UL (ref 4.31–10.16)

## 2022-05-28 PROCEDURE — 84153 ASSAY OF PSA TOTAL: CPT

## 2022-05-28 PROCEDURE — 83036 HEMOGLOBIN GLYCOSYLATED A1C: CPT

## 2022-05-28 PROCEDURE — 80061 LIPID PANEL: CPT

## 2022-05-28 PROCEDURE — 80053 COMPREHEN METABOLIC PANEL: CPT

## 2022-05-28 PROCEDURE — 72110 X-RAY EXAM L-2 SPINE 4/>VWS: CPT

## 2022-05-28 PROCEDURE — 36415 COLL VENOUS BLD VENIPUNCTURE: CPT

## 2022-05-28 PROCEDURE — 85025 COMPLETE CBC W/AUTO DIFF WBC: CPT

## 2022-05-28 PROCEDURE — 84154 ASSAY OF PSA FREE: CPT

## 2022-05-31 LAB
PSA FREE MFR SERPL: 17.4 %
PSA FREE SERPL-MCNC: 0.33 NG/ML
PSA SERPL-MCNC: 1.9 NG/ML (ref 0–4)

## 2022-06-02 DIAGNOSIS — M47.819 ARTHRITIS OF SPINE: Primary | ICD-10-CM

## 2022-06-10 ENCOUNTER — HOSPITAL ENCOUNTER (OUTPATIENT)
Dept: NON INVASIVE DIAGNOSTICS | Facility: HOSPITAL | Age: 57
Discharge: HOME | End: 2022-06-10
Attending: INTERNAL MEDICINE
Payer: COMMERCIAL

## 2022-06-10 DIAGNOSIS — I10 ESSENTIAL HYPERTENSION: ICD-10-CM

## 2022-06-10 DIAGNOSIS — R06.00 DOE (DYSPNEA ON EXERTION): ICD-10-CM

## 2022-06-10 LAB
BASELINE ST DEPRESSION: 0 MM
MAX HR PERCENT: 90 %
MAX HR: 146 BPM
RATE PRESSURE PRODUCT: NORMAL
SL CV STRESS RECOVERY BP: NORMAL MMHG
SL CV STRESS RECOVERY HR: 97 BPM
SL CV STRESS STAGE REACHED: 3
STRESS BASELINE BP: NORMAL MMHG
STRESS BASELINE HR: 72 BPM
STRESS O2 SAT REST: 96 %
STRESS PEAK HR: 146 BPM
STRESS POST ESTIMATED WORKLOAD: 10.1 METS
STRESS POST EXERCISE DUR MIN: 7 MIN
STRESS POST EXERCISE DUR SEC: 2 SEC
STRESS POST O2 SAT PEAK: 95 %
STRESS POST PEAK BP: 180 MMHG

## 2022-06-10 PROCEDURE — 93016 CV STRESS TEST SUPVJ ONLY: CPT | Performed by: INTERNAL MEDICINE

## 2022-06-10 PROCEDURE — 93018 CV STRESS TEST I&R ONLY: CPT | Performed by: INTERNAL MEDICINE

## 2022-06-10 PROCEDURE — 93017 CV STRESS TEST TRACING ONLY: CPT

## 2022-06-13 LAB
ARRHY DURING EX: NORMAL
CHEST PAIN STATEMENT: NORMAL
MAX DIASTOLIC BP: 66 MMHG
MAX HEART RATE: 146 BPM
MAX PREDICTED HEART RATE: 161 BPM
MAX. SYSTOLIC BP: 180 MMHG
PROTOCOL NAME: NORMAL
REASON FOR TERMINATION: NORMAL
TARGET HR FORMULA: NORMAL
TEST INDICATION: NORMAL
TIME IN EXERCISE PHASE: NORMAL

## 2022-07-06 NOTE — PROGRESS NOTES
Assessment:  1  Lumbar facet arthropathy    2  Arthritis of spine    3  Sacroiliac pain    4  Chronic bilateral low back pain with right-sided sciatica        Plan:  Orders Placed This Encounter   Procedures    FL spine and pain procedure     Standing Status:   Future     Standing Expiration Date:   7/8/2026     Order Specific Question:   Reason for Exam:     Answer:   B/L SI joint injection     Order Specific Question:   Anticoagulant hold needed? Answer:   no    Ambulatory referral to Physical Therapy     Standing Status:   Future     Standing Expiration Date:   7/8/2023     Referral Priority:   Routine     Referral Type:   Physical Therapy     Referral Reason:   Specialty Services Required     Requested Specialty:   Physical Therapy     Number of Visits Requested:   1     Expiration Date:   7/8/2023       New Medications Ordered This Visit   Medications    lidocaine (Lidoderm) 5 %     Sig: Apply 1 patch topically daily Remove & Discard patch within 12 hours or as directed by MD     Dispense:  10 patch     Refill:  0       My impressions and treatment recommendations were discussed in detail with the patient, who verbalized understanding and had no further questions  This is a 57-year-old male who presents to our office with chief complaint of bilateral low back pain, right greater than left  He is notable pain to palpation of the bilateral SI joints, right greater than left with pain with provocative maneuvers as noted below  Appears he may be clinically symptomatic from SI joint dysfunction  Furthermore, he has notable pain over the lumbar facet joints and pain with lumbar extension  Has mild pain with bilateral facet loading  This is in the setting of notable degenerative facet disease on imaging  Appears that the source of his axial low back pain is multifactorial     Discussed starting conservatively with aquatic therapy for improvement in flexibility and range of motion    Furthermore, discussed bilateral SI joint injection to initially help with his back symptoms  If he continues to have notable back pain, he would certainly be candidate for bilateral L4-5 and L5-S1 medial branch block with progression to radiofrequency ablation if notable relief  Patient works as a  so his schedule is quite limiting  He works from 5:00 a m  to 5:00 p m  Sully Cho Attending PT maybe a little difficult    He also endorses pain radiating down the right lower extremity  He is neurologically intact on exam today  Pain extends down to the lateral calf  If no relief with injections and physical therapy, then consider getting MRI of the lumbar spine to assess for compressive pathology for presumed sciatica symptoms  [place] Prescription Drug Monitoring Program report was reviewed and was appropriate     Complete risks and benefits including bleeding, infection, tissue reaction, nerve injury and allergic reaction were discussed  The approach was demonstrated using models and literature was provided  Verbal and written consent was obtained  Discharge instructions were provided  I personally saw and examined the patient and I agree with the above discussed plan of care  History of Present Illness:    Gildardo Rosario is a 59 y o  male who presents to [facility] for initial evaluation of the above stated pain complaints  The patient has a past medical and chronic pain history as outlined in the assessment section  She was referred by Linwood Adames MD  [address] [facility] [address]  [facility],  [address] [facility] [address]     He has had back pain for 10 years  Moderate to severe in intensity over the past month  Pain is 6-7/10, nearly constant, worse in afternoon, and described as numbness, pressure-like, throbbing  Reports weakness in both legs with "heaviness"  Denies bowel and bladder incontinence or saddle anesthesia  Increase w/ standing, walking, exercising    PMH includes HTN   nO TOBACCO, MJ, latex allergy, contrast allergy  No tobacco, or MJ use  No latex or contrast allergy  Not currently using any medications to treat his symptoms  No recent physical therapy  He has had history of chronic low back pain but over the last 2 months has been going on down the right leg  Travels down the outer part of the right leg and posterolateral calf  He works as a   Review of Systems:    Review of Systems   Constitutional: Negative for fever and unexpected weight change  HENT: Negative for trouble swallowing  Eyes: Positive for visual disturbance  Respiratory: Negative for shortness of breath and wheezing  Cardiovascular: Positive for leg swelling  Negative for chest pain and palpitations  Gastrointestinal: Negative for constipation, diarrhea, nausea and vomiting  Endocrine: Negative for cold intolerance, heat intolerance and polydipsia  Genitourinary: Negative for difficulty urinating and frequency  Musculoskeletal: Negative for arthralgias, gait problem, joint swelling and myalgias  Skin: Negative for rash  Neurological: Negative for dizziness, seizures, syncope, weakness and headaches  Hematological: Does not bruise/bleed easily  Psychiatric/Behavioral: Negative for dysphoric mood  All other systems reviewed and are negative          Patient Active Problem List   Diagnosis    Stage 3a chronic kidney disease ([facility] [address] )    Essential hypertension    Chronic kidney disease-mineral and bone disorder    History of colonic polyps       Past Medical History:   Diagnosis Date    Chronic kidney disease     Colon polyp     Hypertension        Past Surgical History:   Procedure Laterality Date    COLONOSCOPY         Family History   Problem Relation Age of Onset    No Known Problems Mother     No Known Problems Father     Cancer Sister     Cancer Brother     No Known Problems Brother     No Known Problems Brother        Social History Occupational History    Not on file   Tobacco Use    Smoking status: Never Smoker    Smokeless tobacco: Never Used   Vaping Use    Vaping Use: Never used   Substance and Sexual Activity    Alcohol use: Yes     Comment: Once a week    Drug use: Never    Sexual activity: Yes     Partners: Female         Current Outpatient Medications:     amLODIPine-benazepril (LOTREL) 10-40 MG per capsule, Take 1 capsule by mouth daily, Disp: , Rfl:     cloNIDine (CATAPRES-TTS-3) 0 3 mg/24 hr, Place 1 patch (0 3 mg total) on the skin once a week, Disp: 12 patch, Rfl: 3    lidocaine (Lidoderm) 5 %, Apply 1 patch topically daily Remove & Discard patch within 12 hours or as directed by MD, Disp: 10 patch, Rfl: 0    multivitamin (THERAGRAN) TABS, Take 1 tablet by mouth daily, Disp: , Rfl:     Omega-3 Fatty Acids (fish oil) 1,000 mg, Take 1,000 mg by mouth daily, Disp: , Rfl:     No Known Allergies    Physical Exam:    /74 (BP Location: Left arm, Patient Position: Sitting, Cuff Size: Standard)   Pulse 65   Ht 6' 1" (1 854 m)   Wt 119 kg (261 lb 9 6 oz)   BMI 34 51 kg/m²     Constitutional: normal, well developed, well nourished, alert, in no distress and non-toxic and no overt pain behavior  Eyes: anicteric  HEENT: grossly intact  Neck: supple, symmetric, trachea midline and no masses   Pulmonary:even and unlabored  Cardiovascular:No edema or pitting edema present  Skin:Normal without rashes or lesions and well hydrated  Psychiatric:Mood and affect appropriate  Neurologic:Cranial Nerves II-XII grossly intact  Musculoskeletal:normal     Lumbar Spine Exam    Appearance:  Normal lordosis  Palpation/Tenderness:  no tenderness or spasm  Sensory:  no sensory deficits noted  Range of Motion:  Flexion:   Moderately limited  with pain  Extension:  Moderately limited  with pain  Lateral Flexion - Left:  Moderately limited  with pain  Lateral Flexion - Right:  Moderately limited  with pain  Rotation - Left:  Moderately limited  with pain  Rotation - Right:  Moderately limited  with pain  Motor Strength:  Left hip flexion:  5/5  Left hip extension:  5/5  Right hip flexion:  5/5  Right hip extension:  5/5  Left knee flexion:  5/5  Left knee extension:  5/5  Right knee flexion:  5/5  Right knee extension:  5/5  Left foot dorsiflexion:  5/5  Left foot plantar flexion:  5/5  Right foot dorsiflexion:  5/5  Right foot plantar flexion:  5/5  Reflexes:  Left Patellar:  2+   Right Patellar:  2+   Left Achilles:  2+   Right Achilles:  2+   Special Tests:  Left Straight Leg Test:  negative  Right Straight Leg Test:  negative  Left Gasper's Maneuver:  positive  Right Gasper's Maneuver:  positive  Left Gaenslen's Test:  positive  Right Gaenslen's Test:  positive  Left Pelvic Distraction Test:  positive  Right Pelvic Distraction Test:  positive      Imaging  LUMBAR SPINE  5/28/2022     INDICATION:   M54 16: Radiculopathy, lumbar region        COMPARISON:  None     VIEWS:  XR SPINE LUMBAR MINIMUM 4 VIEWS NON INJURY  Images: 5     FINDINGS:     Transitional lumbosacral vertebra is present  There are 4 non rib bearing lumbar vertebrae with sacralization of the L5 vertebra bilaterally       There is no evidence of acute fracture or destructive osseous lesion      Grade 1 anterolisthesis of L3 on L4       Intervertebral disc space narrowing at L3-4, L4-5      The pedicles appear intact   Facet arthropathy at L3-4 and L4-5      Soft tissues are unremarkable      IMPRESSION:     Multilevel degenerative changes of lumbar spine         FL spine and pain procedure    (Results Pending)       Orders Placed This Encounter   Procedures    FL spine and pain procedure    Ambulatory referral to Physical Therapy

## 2022-07-08 ENCOUNTER — CONSULT (OUTPATIENT)
Dept: PAIN MEDICINE | Facility: CLINIC | Age: 57
End: 2022-07-08
Payer: COMMERCIAL

## 2022-07-08 VITALS
BODY MASS INDEX: 34.67 KG/M2 | HEART RATE: 65 BPM | SYSTOLIC BLOOD PRESSURE: 125 MMHG | WEIGHT: 261.6 LBS | DIASTOLIC BLOOD PRESSURE: 74 MMHG | HEIGHT: 73 IN

## 2022-07-08 DIAGNOSIS — G89.29 CHRONIC BILATERAL LOW BACK PAIN WITH RIGHT-SIDED SCIATICA: ICD-10-CM

## 2022-07-08 DIAGNOSIS — M47.816 LUMBAR FACET ARTHROPATHY: Primary | ICD-10-CM

## 2022-07-08 DIAGNOSIS — M47.819 ARTHRITIS OF SPINE: ICD-10-CM

## 2022-07-08 DIAGNOSIS — M54.41 CHRONIC BILATERAL LOW BACK PAIN WITH RIGHT-SIDED SCIATICA: ICD-10-CM

## 2022-07-08 DIAGNOSIS — M53.3 SACROILIAC PAIN: ICD-10-CM

## 2022-07-08 PROCEDURE — 3074F SYST BP LT 130 MM HG: CPT | Performed by: STUDENT IN AN ORGANIZED HEALTH CARE EDUCATION/TRAINING PROGRAM

## 2022-07-08 PROCEDURE — 3078F DIAST BP <80 MM HG: CPT | Performed by: STUDENT IN AN ORGANIZED HEALTH CARE EDUCATION/TRAINING PROGRAM

## 2022-07-08 PROCEDURE — 99204 OFFICE O/P NEW MOD 45 MIN: CPT | Performed by: STUDENT IN AN ORGANIZED HEALTH CARE EDUCATION/TRAINING PROGRAM

## 2022-07-08 RX ORDER — LIDOCAINE 50 MG/G
1 PATCH TOPICAL DAILY
Qty: 10 PATCH | Refills: 0 | Status: SHIPPED | OUTPATIENT
Start: 2022-07-08

## 2022-07-08 NOTE — PATIENT INSTRUCTIONS
Sacroiliitis   WHAT YOU NEED TO KNOW:   Sacroiliitis is a painful swelling of one or both of your sacroiliac joints that lasts at least 3 months  The sacroiliac joint connects your pelvis to the base of your spine  DISCHARGE INSTRUCTIONS:   Medicines:  Ask for more information about these and other medicines you may need to treat sacroiliitis:  Pain medicine: You may be given medicine to take away or decrease pain  Do not wait until the pain is severe before you take your medicine  You may be given the medicine as a pill to swallow or as a lotion that you put on the painful area  NSAIDs  help decrease swelling and pain or fever  This medicine is available with or without a doctor's order  NSAIDs can cause stomach bleeding or kidney problems in certain people  If you take blood thinner medicine, always ask your healthcare provider if NSAIDs are safe for you  Always read the medicine label and follow directions  Muscle relaxers  help decrease pain and muscle spasms  Take your medicine as directed  Contact your healthcare provider if you think your medicine is not helping or if you have side effects  Tell him of her if you are allergic to any medicine  Keep a list of the medicines, vitamins, and herbs you take  Include the amounts, and when and why you take them  Bring the list or the pill bottles to follow-up visits  Carry your medicine list with you in case of an emergency  Physical therapy:  Your healthcare provider may suggest physical therapy  Your physical therapist may teach you exercises to improve posture (the way you stand and sit), flexibility, and strength in your lower back  He may also teach you how to remain safely active and avoid further injury  Follow the exercise plan given to you by your physical therapist   Rest:  Follow your healthcare provider's instructions about how much rest you should get  Avoid activity that worsens your pain    Ice or heat packs:  Use ice or heat packs on the sore area of your body to decrease the pain and swelling  Put ice in a plastic bag covered with a towel on your low back  Cover heated items with a towel to avoid burns  Use ice and heat as directed  Follow up with your healthcare provider or spine specialist within 1 to 2 weeks:  Write down your questions so you remember to ask them during your visits  Contact your healthcare provider or spine specialist if:   Your pain makes it hard for you to do your daily activities, such as work or school  Your pain does not go away after treatment  You feel depressed or anxious  You have questions about your condition or care  Return to the emergency department if:   You have a fever  Your pain is worse than before  Your pain prevents you from sleeping  © Copyright [employer] 2022 Information is for End User's use only and may not be sold, redistributed or otherwise used for commercial purposes  All illustrations and images included in CareNotes® are the copyrighted property of A D A M [employer] Inc  or Rachel Fleming   The above information is an  only  It is not intended as medical advice for individual conditions or treatments  Talk to your doctor, nurse or pharmacist before following any medical regimen to see if it is safe and effective for you

## 2022-07-20 ENCOUNTER — TELEPHONE (OUTPATIENT)
Dept: NEPHROLOGY | Facility: CLINIC | Age: 57
End: 2022-07-20

## 2022-07-20 ENCOUNTER — TELEPHONE (OUTPATIENT)
Dept: PAIN MEDICINE | Facility: MEDICAL CENTER | Age: 57
End: 2022-07-20

## 2022-07-20 ENCOUNTER — EVALUATION (OUTPATIENT)
Dept: PHYSICAL THERAPY | Facility: CLINIC | Age: 57
End: 2022-07-20
Payer: COMMERCIAL

## 2022-07-20 DIAGNOSIS — M47.816 LUMBAR FACET ARTHROPATHY: ICD-10-CM

## 2022-07-20 DIAGNOSIS — M53.3 SACROILIAC PAIN: ICD-10-CM

## 2022-07-20 DIAGNOSIS — M54.41 CHRONIC BILATERAL LOW BACK PAIN WITH RIGHT-SIDED SCIATICA: ICD-10-CM

## 2022-07-20 DIAGNOSIS — G89.29 CHRONIC BILATERAL LOW BACK PAIN WITH RIGHT-SIDED SCIATICA: ICD-10-CM

## 2022-07-20 DIAGNOSIS — M47.816 LUMBAR FACET ARTHROPATHY: Primary | ICD-10-CM

## 2022-07-20 PROCEDURE — 97162 PT EVAL MOD COMPLEX 30 MIN: CPT | Performed by: PHYSICAL THERAPIST

## 2022-07-20 PROCEDURE — 97112 NEUROMUSCULAR REEDUCATION: CPT | Performed by: PHYSICAL THERAPIST

## 2022-07-20 NOTE — TELEPHONE ENCOUNTER
Pt called stating that he needs a new script for PT   Pt is not able to do the aqua therapy due to his work shift so he needs an updated script     Pt can be reached at [phone]

## 2022-07-20 NOTE — TELEPHONE ENCOUNTER
Called to remind patient to get labs done prior to 7/29 Appt, was unable to [facility] patient phone was not excepting any  voicemail at this time mail box was full
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
## 2022-07-25 ENCOUNTER — OFFICE VISIT (OUTPATIENT)
Dept: PHYSICAL THERAPY | Facility: CLINIC | Age: 57
End: 2022-07-25
Payer: COMMERCIAL

## 2022-07-25 DIAGNOSIS — G89.29 CHRONIC BILATERAL LOW BACK PAIN WITH RIGHT-SIDED SCIATICA: ICD-10-CM

## 2022-07-25 DIAGNOSIS — M54.41 CHRONIC BILATERAL LOW BACK PAIN WITH RIGHT-SIDED SCIATICA: ICD-10-CM

## 2022-07-25 DIAGNOSIS — M47.816 LUMBAR FACET ARTHROPATHY: Primary | ICD-10-CM

## 2022-07-25 DIAGNOSIS — M53.3 SACROILIAC PAIN: ICD-10-CM

## 2022-07-25 PROCEDURE — 97110 THERAPEUTIC EXERCISES: CPT | Performed by: PHYSICAL THERAPIST

## 2022-07-25 NOTE — PROGRESS NOTES
Daily Note     Today's date: 2022  Patient name: Cathryn Hooks  : 1965  MRN: [id number]  Referring provider: Maggie Oquendo MD  Dx:   Encounter Diagnosis     ICD-10-CM    1  Lumbar facet arthropathy  M47 816    2  Sacroiliac pain  M53 3    3  Chronic bilateral low back pain with right-sided sciatica  M54 41     G89 29                   Subjective: Pt reports feeling better with laying prone on stomach  He reports today he has numbness/tingling in LE to lateral calf  Objective: See treatment diary below      Assessment: Tolerated treatment well  Able to progress from multiple pillows all the way through prone extensions  Following treatment, pt reported no LE symptoms  Updated HEP to add extensions during work  Patient would benefit from continued PT      Plan: Continue per plan of care  Progress treatment as tolerated         Precautions: HTN      Manuals            Lumbar PA mobs 3x30" MIKAELA gd 2-3                                                   Neuro Re-Ed             Education MIKAELA                                                                                          Ther Ex             Prone lying 5' 3 pillows            Prone extension progression  30'           Standing on forearm extension  3x10                                                                            Ther Activity                                       Gait Training                                       Modalities

## 2022-07-28 ENCOUNTER — TELEPHONE (OUTPATIENT)
Dept: NEPHROLOGY | Facility: CLINIC | Age: 57
End: 2022-07-28

## 2022-07-28 NOTE — TELEPHONE ENCOUNTER
I tried to call patient about confirming his appointment for Friday 7/29/2022 6 month follow up with Dr Florencio Roy, but the patient voice mail box is full and I was not able to leave a message   Timo Yoder,

## 2022-07-29 ENCOUNTER — APPOINTMENT (OUTPATIENT)
Dept: LAB | Facility: HOSPITAL | Age: 57
End: 2022-07-29
Payer: COMMERCIAL

## 2022-07-29 ENCOUNTER — OFFICE VISIT (OUTPATIENT)
Dept: NEPHROLOGY | Facility: CLINIC | Age: 57
End: 2022-07-29
Payer: COMMERCIAL

## 2022-07-29 ENCOUNTER — TELEPHONE (OUTPATIENT)
Dept: NEPHROLOGY | Facility: CLINIC | Age: 57
End: 2022-07-29

## 2022-07-29 VITALS
HEART RATE: 59 BPM | SYSTOLIC BLOOD PRESSURE: 120 MMHG | RESPIRATION RATE: 16 BRPM | TEMPERATURE: 97.6 F | DIASTOLIC BLOOD PRESSURE: 80 MMHG | OXYGEN SATURATION: 95 % | BODY MASS INDEX: 34.99 KG/M2 | WEIGHT: 264 LBS | HEIGHT: 73 IN

## 2022-07-29 DIAGNOSIS — E83.9 CHRONIC KIDNEY DISEASE-MINERAL AND BONE DISORDER: ICD-10-CM

## 2022-07-29 DIAGNOSIS — N18.9 CHRONIC KIDNEY DISEASE-MINERAL AND BONE DISORDER: ICD-10-CM

## 2022-07-29 DIAGNOSIS — M89.9 CHRONIC KIDNEY DISEASE-MINERAL AND BONE DISORDER: ICD-10-CM

## 2022-07-29 DIAGNOSIS — I10 ESSENTIAL HYPERTENSION: ICD-10-CM

## 2022-07-29 DIAGNOSIS — N18.31 STAGE 3A CHRONIC KIDNEY DISEASE (HCC): ICD-10-CM

## 2022-07-29 DIAGNOSIS — N18.31 STAGE 3A CHRONIC KIDNEY DISEASE (HCC): Primary | ICD-10-CM

## 2022-07-29 LAB
25(OH)D3 SERPL-MCNC: 24.1 NG/ML (ref 30–100)
ANION GAP SERPL CALCULATED.3IONS-SCNC: 10 MMOL/L (ref 4–13)
BASOPHILS # BLD AUTO: 0.03 THOUSANDS/ΜL (ref 0–0.1)
BASOPHILS NFR BLD AUTO: 1 % (ref 0–1)
BUN SERPL-MCNC: 19 MG/DL (ref 5–25)
CALCIUM SERPL-MCNC: 8.7 MG/DL (ref 8.3–10.1)
CHLORIDE SERPL-SCNC: 106 MMOL/L (ref 96–108)
CO2 SERPL-SCNC: 25 MMOL/L (ref 21–32)
CREAT SERPL-MCNC: 1.48 MG/DL (ref 0.6–1.3)
EOSINOPHIL # BLD AUTO: 0.15 THOUSAND/ΜL (ref 0–0.61)
EOSINOPHIL NFR BLD AUTO: 4 % (ref 0–6)
ERYTHROCYTE [DISTWIDTH] IN BLOOD BY AUTOMATED COUNT: 13.5 % (ref 11.6–15.1)
GFR SERPL CREATININE-BSD FRML MDRD: 51 ML/MIN/1.73SQ M
GLUCOSE P FAST SERPL-MCNC: 103 MG/DL (ref 65–99)
HCT VFR BLD AUTO: 43.8 % (ref 36.5–49.3)
HGB BLD-MCNC: 14 G/DL (ref 12–17)
IMM GRANULOCYTES # BLD AUTO: 0.02 THOUSAND/UL (ref 0–0.2)
IMM GRANULOCYTES NFR BLD AUTO: 1 % (ref 0–2)
LYMPHOCYTES # BLD AUTO: 1.77 THOUSANDS/ΜL (ref 0.6–4.47)
LYMPHOCYTES NFR BLD AUTO: 46 % (ref 14–44)
MCH RBC QN AUTO: 29.7 PG (ref 26.8–34.3)
MCHC RBC AUTO-ENTMCNC: 32 G/DL (ref 31.4–37.4)
MCV RBC AUTO: 93 FL (ref 82–98)
MONOCYTES # BLD AUTO: 0.32 THOUSAND/ΜL (ref 0.17–1.22)
MONOCYTES NFR BLD AUTO: 9 % (ref 4–12)
NEUTROPHILS # BLD AUTO: 1.45 THOUSANDS/ΜL (ref 1.85–7.62)
NEUTS SEG NFR BLD AUTO: 39 % (ref 43–75)
NRBC BLD AUTO-RTO: 0 /100 WBCS
PHOSPHATE SERPL-MCNC: 3.2 MG/DL (ref 2.7–4.5)
PLATELET # BLD AUTO: 205 THOUSANDS/UL (ref 149–390)
PMV BLD AUTO: 10.3 FL (ref 8.9–12.7)
POTASSIUM SERPL-SCNC: 4.1 MMOL/L (ref 3.5–5.3)
PTH-INTACT SERPL-MCNC: 67.2 PG/ML (ref 18.4–80.1)
RBC # BLD AUTO: 4.71 MILLION/UL (ref 3.88–5.62)
SODIUM SERPL-SCNC: 141 MMOL/L (ref 135–147)
WBC # BLD AUTO: 3.74 THOUSAND/UL (ref 4.31–10.16)

## 2022-07-29 PROCEDURE — 83970 ASSAY OF PARATHORMONE: CPT

## 2022-07-29 PROCEDURE — 80048 BASIC METABOLIC PNL TOTAL CA: CPT

## 2022-07-29 PROCEDURE — 85025 COMPLETE CBC W/AUTO DIFF WBC: CPT

## 2022-07-29 PROCEDURE — 82306 VITAMIN D 25 HYDROXY: CPT

## 2022-07-29 PROCEDURE — 99214 OFFICE O/P EST MOD 30 MIN: CPT | Performed by: INTERNAL MEDICINE

## 2022-07-29 PROCEDURE — 84100 ASSAY OF PHOSPHORUS: CPT

## 2022-07-29 PROCEDURE — 36415 COLL VENOUS BLD VENIPUNCTURE: CPT

## 2022-07-29 NOTE — ASSESSMENT & PLAN NOTE
Lab Results   Component Value Date    EGFR 51 07/29/2022    EGFR 43 05/28/2022    EGFR 46 01/31/2022    CREATININE 1 48 (H) 07/29/2022    CREATININE 1 68 (H) 05/28/2022    CREATININE 1 59 (H) 01/31/2022   Patient renal function is stable at this point  Some fluctuation is there because of volume    Advised hydration and avoiding nephrotoxic medication

## 2022-07-29 NOTE — PROGRESS NOTES
NEPHROLOGY OFFICE FOLLOW UP  Marilee Tony 59 y o  male MRN: [id number]    Encounter: [id number] 7/29/2022    REASON FOR VISIT: Marilee Tony is a 59 y o  male who is here on 7/29/2022 for Chronic Kidney Disease and Follow-up    HPI:    Allen Dietrich came in today for follow-up of CKD  57-year-old gentleman with history of hypertension and CKD  Since I saw him last he was infected COVID-19 virus stayed home without any problem     Denies any new complaint today     No chest pain no palpitation or shortness of breath     No nausea no vomiting     Drinking enough liquid      REVIEW OF SYSTEMS:    Review of Systems   Constitutional: Negative for activity change and fatigue  HENT: Negative for congestion and ear discharge  Eyes: Negative for photophobia and pain  Respiratory: Negative for apnea and choking  Cardiovascular: Negative for chest pain and palpitations  Gastrointestinal: Negative for abdominal distention and blood in stool  Endocrine: Negative for heat intolerance and polyphagia  Genitourinary: Negative for flank pain and urgency  Musculoskeletal: Positive for arthralgias  Negative for neck pain and neck stiffness  Skin: Negative for color change and wound  Allergic/Immunologic: Negative for food allergies and immunocompromised state  Neurological: Negative for seizures and facial asymmetry  Hematological: Negative for adenopathy  Does not bruise/bleed easily  Psychiatric/Behavioral: Negative for self-injury and suicidal ideas           PAST MEDICAL HISTORY:  Past Medical History:   Diagnosis Date    Chronic kidney disease     Colon polyp     Hypertension        PAST SURGICAL HISTORY:  Past Surgical History:   Procedure Laterality Date    COLONOSCOPY         SOCIAL HISTORY:  Social History     Substance and Sexual Activity   Alcohol Use Yes    Comment: Once a week     Social History     Substance and Sexual Activity   Drug Use Never     Social History     Tobacco Use   Smoking Status Never Smoker   Smokeless Tobacco Never Used       FAMILY HISTORY:  Family History   Problem Relation Age of Onset    No Known Problems Mother     No Known Problems Father     Cancer Sister     Cancer Brother     No Known Problems Brother     No Known Problems Brother        MEDICATIONS:    Current Outpatient Medications:     amLODIPine-benazepril (LOTREL) 10-40 MG per capsule, Take 1 capsule by mouth daily, Disp: , Rfl:     cloNIDine (CATAPRES-TTS-3) 0 3 mg/24 hr, Place 1 patch (0 3 mg total) on the skin once a week, Disp: 12 patch, Rfl: 3    lidocaine (Lidoderm) 5 %, Apply 1 patch topically daily Remove & Discard patch within 12 hours or as directed by MD, Disp: 10 patch, Rfl: 0    multivitamin (THERAGRAN) TABS, Take 1 tablet by mouth daily, Disp: , Rfl:     Omega-3 Fatty Acids (fish oil) 1,000 mg, Take 1,000 mg by mouth daily, Disp: , Rfl:     PHYSICAL EXAM:  Vitals:    07/29/22 0939   BP: 120/80   BP Location: Right arm   Patient Position: Sitting   Pulse: 59   Resp: 16   Temp: 97 6 °F (36 4 °C)   TempSrc: Temporal   SpO2: 95%   Weight: 120 kg (264 lb)   Height: 6' 1" (1 854 m)     Body mass index is 34 83 kg/m²  Physical Exam  Constitutional:       General: He is not in acute distress  Appearance: He is well-developed  HENT:      Head: Normocephalic  Eyes:      General: No scleral icterus  Conjunctiva/sclera: Conjunctivae normal    Neck:      Vascular: No JVD  Cardiovascular:      Rate and Rhythm: Normal rate  Heart sounds: Normal heart sounds  Pulmonary:      Effort: Pulmonary effort is normal       Breath sounds: No wheezing  Abdominal:      Palpations: Abdomen is soft  Tenderness: There is no abdominal tenderness  Musculoskeletal:         General: Normal range of motion  Cervical back: Neck supple  Skin:     General: Skin is warm  Findings: No rash  Neurological:      Mental Status: He is alert and oriented to person, place, and time  Psychiatric:         Behavior: Behavior normal          LAB RESULTS:  Results for orders placed or performed in visit on 07/29/22   UA (URINE) with reflex to Scope   Result Value Ref Range    Color, UA Yellow     Clarity, UA Clear     Specific Gravity, UA 1 025 1 003 - 1 030    pH, UA 5 5 4 5, 5 0, 5 5, 6 0, 6 5, 7 0, 7 5, 8 0    Leukocytes, UA Trace (A) Negative    Nitrite, UA Negative Negative    Protein, UA Trace (A) Negative mg/dl    Glucose, UA Negative Negative mg/dl    Ketones, UA Negative Negative mg/dl    Urobilinogen, UA 0 2 0 2, 1 0 E U /dl E U /dl    Bilirubin, UA Negative Negative    Occult Blood, UA Small (A) Negative   Urine Microscopic   Result Value Ref Range    RBC, UA 2-4 None Seen, 0-1, 1-2, 2-4, 0-5 /hpf    WBC, UA 4-10 (A) None Seen, 0-1, 1-2, 0-5, 2-4 /hpf    Epithelial Cells Occasional None Seen, Occasional /hpf    Bacteria, UA Moderate (A) None Seen, Occasional /hpf       ASSESSMENT and PLAN:      Stage 3a chronic kidney disease (HCC)  Lab Results   Component Value Date    EGFR 51 07/29/2022    EGFR 43 05/28/2022    EGFR 46 01/31/2022    CREATININE 1 48 (H) 07/29/2022    CREATININE 1 68 (H) 05/28/2022    CREATININE 1 59 (H) 01/31/2022   Patient renal function is stable at this point  Some fluctuation is there because of volume  Advised hydration and avoiding nephrotoxic medication    Chronic kidney disease-mineral and bone disorder  Lab Results   Component Value Date    EGFR 51 07/29/2022    EGFR 43 05/28/2022    EGFR 46 01/31/2022    CREATININE 1 48 (H) 07/29/2022    CREATININE 1 68 (H) 05/28/2022    CREATININE 1 59 (H) 01/31/2022   PTH and phosphorus along with vitamin-D are within acceptable range and will continue to monitor    Essential hypertension  Blood pressure is very well control with present medication which include ACE-inhibitor      Everything discussed the patient at length  I will see him back in 6 months    Will get blood and urine test before that visit        Portions of the record may have been created with voice recognition software  Occasional wrong word or "sound a like" substitutions may have occurred due to the inherent limitations of voice recognition software  Read the chart carefully and recognize, using context, where substitutions have occurred  If you have any questions, please contact the dictating provider

## 2022-07-29 NOTE — ASSESSMENT & PLAN NOTE
Lab Results   Component Value Date    EGFR 51 07/29/2022    EGFR 43 05/28/2022    EGFR 46 01/31/2022    CREATININE 1 48 (H) 07/29/2022    CREATININE 1 68 (H) 05/28/2022    CREATININE 1 59 (H) 01/31/2022   PTH and phosphorus along with vitamin-D are within acceptable range and will continue to monitor

## 2022-08-09 ENCOUNTER — APPOINTMENT (EMERGENCY)
Dept: RADIOLOGY | Facility: HOSPITAL | Age: 57
End: 2022-08-09

## 2022-08-09 ENCOUNTER — HOSPITAL ENCOUNTER (EMERGENCY)
Facility: HOSPITAL | Age: 57
Discharge: HOME | End: 2022-08-09
Attending: EMERGENCY MEDICINE | Admitting: EMERGENCY MEDICINE

## 2022-08-09 VITALS
TEMPERATURE: 98.4 F | SYSTOLIC BLOOD PRESSURE: 143 MMHG | DIASTOLIC BLOOD PRESSURE: 80 MMHG | OXYGEN SATURATION: 97 % | HEART RATE: 60 BPM | RESPIRATION RATE: 18 BRPM

## 2022-08-09 DIAGNOSIS — M25.512 ACUTE PAIN OF LEFT SHOULDER: Primary | ICD-10-CM

## 2022-08-09 PROCEDURE — 96372 THER/PROPH/DIAG INJ SC/IM: CPT

## 2022-08-09 PROCEDURE — 99283 EMERGENCY DEPT VISIT LOW MDM: CPT

## 2022-08-09 PROCEDURE — 73030 X-RAY EXAM OF SHOULDER: CPT

## 2022-08-09 PROCEDURE — 99284 EMERGENCY DEPT VISIT MOD MDM: CPT | Performed by: EMERGENCY MEDICINE

## 2022-08-09 PROCEDURE — 73080 X-RAY EXAM OF ELBOW: CPT

## 2022-08-09 RX ORDER — KETOROLAC TROMETHAMINE 30 MG/ML
15 INJECTION, SOLUTION INTRAMUSCULAR; INTRAVENOUS ONCE
Status: COMPLETED | OUTPATIENT
Start: 2022-08-09 | End: 2022-08-09

## 2022-08-09 RX ORDER — ACETAMINOPHEN 325 MG/1
650 TABLET ORAL ONCE
Status: COMPLETED | OUTPATIENT
Start: 2022-08-09 | End: 2022-08-09

## 2022-08-09 RX ORDER — LIDOCAINE 50 MG/G
1 PATCH TOPICAL ONCE
Status: DISCONTINUED | OUTPATIENT
Start: 2022-08-09 | End: 2022-08-09 | Stop reason: HOSPADM

## 2022-08-09 RX ADMIN — ACETAMINOPHEN 650 MG: 325 TABLET, FILM COATED ORAL at 19:56

## 2022-08-09 RX ADMIN — LIDOCAINE 5% 1 PATCH: 700 PATCH TOPICAL at 19:56

## 2022-08-09 RX ADMIN — KETOROLAC TROMETHAMINE 15 MG: 30 INJECTION, SOLUTION INTRAMUSCULAR at 19:57

## 2022-08-09 NOTE — ED PROVIDER NOTES
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 "it wasn't coming off so I was just trying to jerk it up"  Patient reporting left shoulder pain down to left elbow, denies any numbness and tingling but displays difficulty abducting left elbow and shoulder due to pain  2+ radial pulses, cap refill less than 2 seconds  HPI and MDM    59 y o  male presenting with left shoulder pain  Patient states around 6:00 p m  he was trying to detach his trailer from his trunk, and was pulling on the lever and pulled very hard and felt a pain in his left shoulder going into his left arm  No trauma  No numbness or tingling, no chest pain or shortness of breath  No history of surgeries in his left shoulder  No acute fractures or dislocations noted on my interpretation of x-rays  Concern primarily for muscular strain  Patient given anti-inflammatory and placed in sling  He is neurovascularly intact  Advised supportive care sports medicine follow-up  Return precautions discussed, patient verbalized understanding and is in agreement with plan                Medications   lidocaine (LIDODERM) 5 % patch 1 patch (1 patch Topical Medication Applied 8/9/22 1956)   ketorolac (TORADOL) injection 15 mg (15 mg Intramuscular Given 8/9/22 1957)   acetaminophen (TYLENOL) tablet 650 mg (650 mg Oral Given 8/9/22 1956)         Past Medical and Surgical History    Past Medical History:   Diagnosis Date    Chronic kidney disease     Colon polyp     Hypertension        Past Surgical History:   Procedure Laterality Date    COLONOSCOPY         Family History   Problem Relation Age of Onset    No Known Problems Mother     No Known Problems Father     Cancer Sister  Cancer Brother     No Known Problems Brother     No Known Problems Brother        Social History     Tobacco Use    Smoking status: Never Smoker    Smokeless tobacco: Never Used   Vaping Use    Vaping Use: Never used   Substance Use Topics    Alcohol use: Yes     Comment: Once a week    Drug use: Never           Allergies    No Known Allergies    Home Medications    Prior to Admission medications    Medication Sig Start Date End Date Taking? Authorizing Provider   amLODIPine-benazepril (LOTREL) 10-40 MG per capsule Take 1 capsule by mouth daily    Historical Provider, MD   cloNIDine (CATAPRES-TTS-3) 0 3 mg/24 hr Place 1 patch (0 3 mg total) on the skin once a week 3/11/22   Mary Hagen MD   lidocaine (Lidoderm) 5 % Apply 1 patch topically daily Remove & Discard patch within 12 hours or as directed by MD 7/8/22   Alexei Dunbar MD   multivitamin (THERAGRAN) TABS Take 1 tablet by mouth daily    Historical Provider, MD   Omega-3 Fatty Acids (fish oil) 1,000 mg Take 1,000 mg by mouth daily    Historical Provider, MD           Review of Systems    Review of Systems   Constitutional: Negative for chills and fever  Respiratory: Negative for cough and shortness of breath  Gastrointestinal: Negative for nausea and vomiting  Musculoskeletal: Negative for joint swelling  L shoulder pain   Skin: Negative for rash and wound  Neurological: Negative for weakness and numbness  Physical Exam      ED Triage Vitals [08/09/22 1859]   Temperature Pulse Respirations Blood Pressure SpO2   98 9 °F (37 2 °C) 64 20 136/76 98 %      Temp Source Heart Rate Source Patient Position - Orthostatic VS BP Location FiO2 (%)   Tympanic Monitor Sitting Left arm --      Pain Score       --               Physical Exam  Constitutional:       General: He is not in acute distress  Appearance: He is not ill-appearing  HENT:      Head: Normocephalic and atraumatic        Nose: Nose normal       Mouth/Throat: Mouth: Mucous membranes are moist    Eyes:      Extraocular Movements: Extraocular movements intact  Pupils: Pupils are equal, round, and reactive to light  Cardiovascular:      Rate and Rhythm: Normal rate and regular rhythm  Pulmonary:      Effort: Pulmonary effort is normal  No respiratory distress  Abdominal:      General: There is no distension  Palpations: Abdomen is soft  Musculoskeletal:         General: No swelling or deformity  Normal range of motion  Cervical back: Normal range of motion and neck supple  Comments: Tenderness along left trapezius muscle, compartments are soft   Skin:     General: Skin is warm  Capillary Refill: Capillary refill takes less than 2 seconds  Findings: No erythema  Neurological:      Mental Status: He is alert and oriented to person, place, and time  Mental status is at baseline  Sensory: No sensory deficit  Motor: No weakness  Diagnostic Results      Labs:    Results Reviewed     None          All labs reviewed and utilized in the medical decision making process    Radiology:    XR shoulder 2+ views LEFT    (Results Pending)   XR elbow 3+ views LEFT    (Results Pending)       All radiology studies independently viewed by me and interpreted by the radiologist     Procedure    Procedures        FINAL IMPRESSION    Final diagnoses:   Acute pain of left shoulder         DISPOSITION    Time reflects when diagnosis was documented in both MDM as applicable and the Disposition within this note     Time User Action Codes Description Comment    8/9/2022  8:06 PM Rosey García Add [J04 [id number]] Acute pain of left shoulder       ED Disposition     ED Disposition   Discharge    Condition   Stable    Date/Time   Tue Aug 9, 2022  8:06 PM    Justin   Glennaangela Simental Welcome discharge to home/self care                 Follow-up Information     Follow up With Specialties Details Why [address], DO Sports Medicine Schedule an appointment as soon as possible for a visit in 1 week  [address]  [facility] [address]  [phone]              PATIENT REFERRED TO:    [employer],   [address]  [place] [address]  [phone]    Schedule an appointment as soon as possible for a visit in 1 week        DISCHARGE MEDICATIONS:    Discharge Medication List as of 8/9/2022  8:23 PM      CONTINUE these medications which have NOT CHANGED    Details   amLODIPine-benazepril (LOTREL) 10-40 MG per capsule Take 1 capsule by mouth daily, Historical Med      cloNIDine (CATAPRES-TTS-3) 0 3 mg/24 hr Place 1 patch (0 3 mg total) on the skin once a week, Starting Fri 3/11/2022, Normal      lidocaine (Lidoderm) 5 % Apply 1 patch topically daily Remove & Discard patch within 12 hours or as directed by MD, Starting Fri 7/8/2022, Normal      multivitamin (THERAGRAN) TABS Take 1 tablet by mouth daily, Historical Med      Omega-3 Fatty Acids (fish oil) 1,000 mg Take 1,000 mg by mouth daily, Historical Med             No discharge procedures on file  Warrick Merlin, DO        This note was partially completed using voice recognition technology, and was scanned for gross errors; however some errors may still exist  Please contact the author with any questions or requests for clarification        Warrick Merlin, DO  08/09/22 2633

## 2022-08-25 NOTE — PROGRESS NOTES
Pt has not been seen in 30 days and cancelled his last two appts   Unable to update goals due to abrupt cessation of PT

## 2022-08-28 ENCOUNTER — APPOINTMENT (OUTPATIENT)
Dept: ULTRASOUND IMAGING | Facility: HOSPITAL | Age: 57
End: 2022-08-28
Payer: COMMERCIAL

## 2022-08-28 ENCOUNTER — HOSPITAL ENCOUNTER (EMERGENCY)
Facility: HOSPITAL | Age: 57
Discharge: HOME | End: 2022-08-29
Attending: EMERGENCY MEDICINE
Payer: COMMERCIAL

## 2022-08-28 VITALS
DIASTOLIC BLOOD PRESSURE: 63 MMHG | TEMPERATURE: 98.5 F | RESPIRATION RATE: 16 BRPM | OXYGEN SATURATION: 97 % | HEART RATE: 63 BPM | SYSTOLIC BLOOD PRESSURE: 114 MMHG

## 2022-08-28 DIAGNOSIS — N45.1 EPIDIDYMITIS: Primary | ICD-10-CM

## 2022-08-28 LAB
ALBUMIN SERPL BCP-MCNC: 3.6 G/DL (ref 3.5–5)
ALP SERPL-CCNC: 76 U/L (ref 46–116)
ALT SERPL W P-5'-P-CCNC: 48 U/L (ref 12–78)
ANION GAP SERPL CALCULATED.3IONS-SCNC: 8 MMOL/L (ref 4–13)
AST SERPL W P-5'-P-CCNC: 19 U/L (ref 5–45)
BASOPHILS # BLD AUTO: 0.02 THOUSANDS/ΜL (ref 0–0.1)
BASOPHILS NFR BLD AUTO: 0 % (ref 0–1)
BILIRUB SERPL-MCNC: 0.64 MG/DL (ref 0.2–1)
BILIRUB UR QL STRIP: NEGATIVE
BUN SERPL-MCNC: 15 MG/DL (ref 5–25)
CALCIUM SERPL-MCNC: 8.7 MG/DL (ref 8.3–10.1)
CHLORIDE SERPL-SCNC: 104 MMOL/L (ref 96–108)
CLARITY UR: ABNORMAL
CO2 SERPL-SCNC: 29 MMOL/L (ref 21–32)
COLOR UR: YELLOW
CREAT SERPL-MCNC: 1.62 MG/DL (ref 0.6–1.3)
EOSINOPHIL # BLD AUTO: 0.1 THOUSAND/ΜL (ref 0–0.61)
EOSINOPHIL NFR BLD AUTO: 1 % (ref 0–6)
ERYTHROCYTE [DISTWIDTH] IN BLOOD BY AUTOMATED COUNT: 13.7 % (ref 11.6–15.1)
GFR SERPL CREATININE-BSD FRML MDRD: 45 ML/MIN/1.73SQ M
GLUCOSE SERPL-MCNC: 111 MG/DL (ref 65–140)
GLUCOSE UR STRIP-MCNC: NEGATIVE MG/DL
HCT VFR BLD AUTO: 46.8 % (ref 36.5–49.3)
HGB BLD-MCNC: 14.7 G/DL (ref 12–17)
HGB UR QL STRIP.AUTO: ABNORMAL
IMM GRANULOCYTES # BLD AUTO: 0.04 THOUSAND/UL (ref 0–0.2)
IMM GRANULOCYTES NFR BLD AUTO: 0 % (ref 0–2)
KETONES UR STRIP-MCNC: NEGATIVE MG/DL
LEUKOCYTE ESTERASE UR QL STRIP: ABNORMAL
LYMPHOCYTES # BLD AUTO: 1.56 THOUSANDS/ΜL (ref 0.6–4.47)
LYMPHOCYTES NFR BLD AUTO: 15 % (ref 14–44)
MCH RBC QN AUTO: 30.6 PG (ref 26.8–34.3)
MCHC RBC AUTO-ENTMCNC: 31.4 G/DL (ref 31.4–37.4)
MCV RBC AUTO: 97 FL (ref 82–98)
MONOCYTES # BLD AUTO: 0.9 THOUSAND/ΜL (ref 0.17–1.22)
MONOCYTES NFR BLD AUTO: 9 % (ref 4–12)
NEUTROPHILS # BLD AUTO: 7.51 THOUSANDS/ΜL (ref 1.85–7.62)
NEUTS SEG NFR BLD AUTO: 75 % (ref 43–75)
NITRITE UR QL STRIP: POSITIVE
NRBC BLD AUTO-RTO: 0 /100 WBCS
PH UR STRIP.AUTO: 6 [PH]
PLATELET # BLD AUTO: 195 THOUSANDS/UL (ref 149–390)
PMV BLD AUTO: 10.3 FL (ref 8.9–12.7)
POTASSIUM SERPL-SCNC: 4.4 MMOL/L (ref 3.5–5.3)
PROT SERPL-MCNC: 7.2 G/DL (ref 6.4–8.4)
PROT UR STRIP-MCNC: ABNORMAL MG/DL
RBC # BLD AUTO: 4.81 MILLION/UL (ref 3.88–5.62)
SODIUM SERPL-SCNC: 141 MMOL/L (ref 135–147)
SP GR UR STRIP.AUTO: 1.02 (ref 1–1.03)
UROBILINOGEN UR QL STRIP.AUTO: 0.2 E.U./DL
WBC # BLD AUTO: 10.13 THOUSAND/UL (ref 4.31–10.16)

## 2022-08-28 PROCEDURE — 87591 N.GONORRHOEAE DNA AMP PROB: CPT | Performed by: EMERGENCY MEDICINE

## 2022-08-28 PROCEDURE — 81001 URINALYSIS AUTO W/SCOPE: CPT | Performed by: EMERGENCY MEDICINE

## 2022-08-28 PROCEDURE — 80053 COMPREHEN METABOLIC PANEL: CPT | Performed by: EMERGENCY MEDICINE

## 2022-08-28 PROCEDURE — 99285 EMERGENCY DEPT VISIT HI MDM: CPT | Performed by: EMERGENCY MEDICINE

## 2022-08-28 PROCEDURE — 96375 TX/PRO/DX INJ NEW DRUG ADDON: CPT

## 2022-08-28 PROCEDURE — 96374 THER/PROPH/DIAG INJ IV PUSH: CPT

## 2022-08-28 PROCEDURE — 85025 COMPLETE CBC W/AUTO DIFF WBC: CPT | Performed by: EMERGENCY MEDICINE

## 2022-08-28 PROCEDURE — 99284 EMERGENCY DEPT VISIT MOD MDM: CPT

## 2022-08-28 PROCEDURE — 87077 CULTURE AEROBIC IDENTIFY: CPT | Performed by: EMERGENCY MEDICINE

## 2022-08-28 PROCEDURE — 76870 US EXAM SCROTUM: CPT

## 2022-08-28 PROCEDURE — 87086 URINE CULTURE/COLONY COUNT: CPT | Performed by: EMERGENCY MEDICINE

## 2022-08-28 PROCEDURE — 87186 SC STD MICRODIL/AGAR DIL: CPT | Performed by: EMERGENCY MEDICINE

## 2022-08-28 PROCEDURE — 36415 COLL VENOUS BLD VENIPUNCTURE: CPT | Performed by: EMERGENCY MEDICINE

## 2022-08-28 PROCEDURE — 87491 CHLMYD TRACH DNA AMP PROBE: CPT | Performed by: EMERGENCY MEDICINE

## 2022-08-28 RX ORDER — DIPHENHYDRAMINE HYDROCHLORIDE 50 MG/ML
50 INJECTION INTRAMUSCULAR; INTRAVENOUS ONCE
Status: COMPLETED | OUTPATIENT
Start: 2022-08-28 | End: 2022-08-28

## 2022-08-28 RX ORDER — FENTANYL CITRATE 50 UG/ML
50 INJECTION, SOLUTION INTRAMUSCULAR; INTRAVENOUS ONCE
Status: COMPLETED | OUTPATIENT
Start: 2022-08-28 | End: 2022-08-28

## 2022-08-28 RX ORDER — CIPROFLOXACIN 500 MG/1
500 TABLET, FILM COATED ORAL EVERY 12 HOURS SCHEDULED
Qty: 20 TABLET | Refills: 0 | Status: SHIPPED | OUTPATIENT
Start: 2022-08-28 | End: 2022-09-07

## 2022-08-28 RX ORDER — CIPROFLOXACIN 500 MG/1
500 TABLET, FILM COATED ORAL ONCE
Status: COMPLETED | OUTPATIENT
Start: 2022-08-28 | End: 2022-08-28

## 2022-08-28 RX ORDER — HYDROCODONE BITARTRATE AND ACETAMINOPHEN 5; 325 MG/1; MG/1
1 TABLET ORAL EVERY 6 HOURS PRN
Qty: 12 TABLET | Refills: 0 | Status: SHIPPED | OUTPATIENT
Start: 2022-08-28 | End: 2022-09-07

## 2022-08-28 RX ORDER — ONDANSETRON 2 MG/ML
4 INJECTION INTRAMUSCULAR; INTRAVENOUS ONCE
Status: COMPLETED | OUTPATIENT
Start: 2022-08-28 | End: 2022-08-28

## 2022-08-28 RX ORDER — ONDANSETRON 4 MG/1
4 TABLET, ORALLY DISINTEGRATING ORAL EVERY 8 HOURS PRN
Qty: 20 TABLET | Refills: 0 | Status: SHIPPED | OUTPATIENT
Start: 2022-08-28

## 2022-08-28 RX ADMIN — ONDANSETRON 4 MG: 2 INJECTION INTRAMUSCULAR; INTRAVENOUS at 22:26

## 2022-08-28 RX ADMIN — FENTANYL CITRATE 50 MCG: 50 INJECTION INTRAMUSCULAR; INTRAVENOUS at 22:26

## 2022-08-28 RX ADMIN — CIPROFLOXACIN HYDROCHLORIDE 500 MG: 500 TABLET, FILM COATED ORAL at 23:54

## 2022-08-28 RX ADMIN — DIPHENHYDRAMINE HYDROCHLORIDE 50 MG: 50 INJECTION, SOLUTION INTRAMUSCULAR; INTRAVENOUS at 22:26

## 2022-08-29 LAB
BACTERIA UR QL AUTO: ABNORMAL /HPF
C TRACH DNA SPEC QL NAA+PROBE: NEGATIVE
N GONORRHOEA DNA SPEC QL NAA+PROBE: NEGATIVE
NON-SQ EPI CELLS URNS QL MICRO: ABNORMAL /HPF
RBC #/AREA URNS AUTO: ABNORMAL /HPF
WBC #/AREA URNS AUTO: ABNORMAL /HPF

## 2022-08-29 NOTE — ED PROVIDER NOTES
History  Chief Complaint   Patient presents with    Testicle Pain     Started this morning and continued to get worse through the day,      59year old male patient presents emergency department for evaluation right testicular pain and swelling  The patient states this started earlier today and has progressively worsened  The patient does like a cremasteric reflex, has a palpably swollen testicle, but is easily mobile within the scrotum and was not obviously torsed at the time of evaluation  The patient does have abdominal pain tenderness  He will be evaluated with a differential diagnosis includes but is not limited to testicular torsion, hernia, epididymitis  History provided by:  Patient   used: No    Testicle Pain  Severity:  Mild  Onset quality:  Gradual  Timing:  Constant  Progression:  Worsening  Chronicity:  New  Associated symptoms: abdominal pain and myalgias    Associated symptoms: no cough        Prior to Admission Medications   Prescriptions Last Dose Informant Patient Reported? Taking?    Omega-3 Fatty Acids (fish oil) 1,000 mg  Self Yes No   Sig: Take 1,000 mg by mouth daily   amLODIPine-benazepril (LOTREL) 10-40 MG per capsule  Self Yes No   Sig: Take 1 capsule by mouth daily   cloNIDine (CATAPRES-TTS-3) 0 3 mg/24 hr  Self No No   Sig: Place 1 patch (0 3 mg total) on the skin once a week   lidocaine (Lidoderm) 5 %  Self No No   Sig: Apply 1 patch topically daily Remove & Discard patch within 12 hours or as directed by MD   multivitamin (THERAGRAN) TABS  Self Yes No   Sig: Take 1 tablet by mouth daily      Facility-Administered Medications: None       Past Medical History:   Diagnosis Date    Chronic kidney disease     Colon polyp     Hypertension        Past Surgical History:   Procedure Laterality Date    COLONOSCOPY         Family History   Problem Relation Age of Onset    No Known Problems Mother     No Known Problems Father     Cancer Sister     Cancer Brother  No Known Problems Brother     No Known Problems Brother      I have reviewed and agree with the history as documented  E-Cigarette/Vaping    E-Cigarette Use Never User      E-Cigarette/Vaping Substances     Social History     Tobacco Use    Smoking status: Never Smoker    Smokeless tobacco: Never Used   Vaping Use    Vaping Use: Never used   Substance Use Topics    Alcohol use: Yes     Comment: Once a week    Drug use: Never       Review of Systems   Respiratory: Negative for cough  Gastrointestinal: Positive for abdominal pain  Genitourinary: Positive for testicular pain  Musculoskeletal: Positive for myalgias  All other systems reviewed and are negative  Physical Exam  Physical Exam  Vitals and nursing note reviewed  Constitutional:       General: He is not in acute distress  Appearance: He is well-developed  He is not diaphoretic  HENT:      Head: Normocephalic and atraumatic  Right Ear: External ear normal       Left Ear: External ear normal    Eyes:      General: No scleral icterus  Right eye: No discharge  Left eye: No discharge  Conjunctiva/sclera: Conjunctivae normal    Neck:      Thyroid: No thyromegaly  Vascular: No JVD  Trachea: No tracheal deviation  Cardiovascular:      Rate and Rhythm: Normal rate and regular rhythm  Pulmonary:      Effort: Pulmonary effort is normal  No respiratory distress  Breath sounds: Normal breath sounds  No stridor  No wheezing or rales  Abdominal:      General: Bowel sounds are normal  There is no distension  Palpations: Abdomen is soft  Tenderness: There is no abdominal tenderness  Genitourinary:      Musculoskeletal:         General: No tenderness or deformity  Normal range of motion  Cervical back: Normal range of motion and neck supple  Skin:     General: Skin is warm and dry  Neurological:      Mental Status: He is alert and oriented to person, place, and time  Cranial Nerves: No cranial nerve deficit  Coordination: Coordination normal    Psychiatric:         Behavior: Behavior normal          Vital Signs  ED Triage Vitals [08/28/22 2141]   Temperature Pulse Respirations Blood Pressure SpO2   98 5 °F (36 9 °C) 93 17 136/80 98 %      Temp Source Heart Rate Source Patient Position - Orthostatic VS BP Location FiO2 (%)   Oral Monitor Sitting Left arm --      Pain Score       --           Vitals:    08/28/22 2141 08/28/22 2354   BP: 136/80 114/63   Pulse: 93 63   Patient Position - Orthostatic VS: Sitting          Visual Acuity      ED Medications  Medications   fentanyl citrate (PF) 100 MCG/2ML 50 mcg (50 mcg Intravenous Given 8/28/22 2226)   ondansetron (ZOFRAN) injection 4 mg (4 mg Intravenous Given 8/28/22 2226)   diphenhydrAMINE (BENADRYL) injection 50 mg (50 mg Intravenous Given 8/28/22 2226)   ciprofloxacin (CIPRO) tablet 500 mg (500 mg Oral Given 8/28/22 2354)       Diagnostic Studies  Results Reviewed     Procedure Component Value Units Date/Time    Urine Microscopic [[id number]]  (Abnormal) Collected: 08/28/22 2343    Lab Status: Final result Specimen: Urine, Clean Catch Updated: 08/29/22 0000     RBC, UA 4-10 /hpf      WBC, UA 20-30 /hpf      Epithelial Cells Occasional /hpf      Bacteria, UA Innumerable /hpf     Urine culture [[id number]] Collected: 08/28/22 2343    Lab Status:  In process Specimen: Urine, Clean Catch Updated: 08/29/22 0000    UA w Reflex to Microscopic w Reflex to Culture [[id number]]  (Abnormal) Collected: 08/28/22 2343    Lab Status: Final result Specimen: Urine, Clean Catch Updated: 08/28/22 2353     Color, UA Yellow     Clarity, UA Turbid     Specific Gravity, UA 1 025     pH, UA 6 0     Leukocytes, UA Trace     Nitrite, UA Positive     Protein, UA 30 (1+) mg/dl      Glucose, UA Negative mg/dl      Ketones, UA Negative mg/dl      Urobilinogen, UA 0 2 E U /dl      Bilirubin, UA Negative     Occult Blood, UA Small    Chlamydia/GC amplified DNA by PCR [[id number]] Collected: 08/28/22 2343    Lab Status:  In process Specimen: Urine, Other Updated: 08/28/22 2349    Comprehensive metabolic panel [[id number]]  (Abnormal) Collected: 08/28/22 2223    Lab Status: Final result Specimen: Blood from Arm, Right Updated: 08/28/22 2245     Sodium 141 mmol/L      Potassium 4 4 mmol/L      Chloride 104 mmol/L      CO2 29 mmol/L      ANION GAP 8 mmol/L      BUN 15 mg/dL      Creatinine 1 62 mg/dL      Glucose 111 mg/dL      Calcium 8 7 mg/dL      AST 19 U/L      ALT 48 U/L      Alkaline Phosphatase 76 U/L      Total Protein 7 2 g/dL      Albumin 3 6 g/dL      Total Bilirubin 0 64 mg/dL      eGFR 45 ml/min/1 73sq m     Narrative:      [place] guidelines for Chronic Kidney Disease (CKD):     Stage 1 with normal or high GFR (GFR > 90 mL/min/1 73 square meters)    Stage 2 Mild CKD (GFR = 60-89 mL/min/1 73 square meters)    Stage 3A Moderate CKD (GFR = 45-59 mL/min/1 73 square meters)    Stage 3B Moderate CKD (GFR = 30-44 mL/min/1 73 square meters)    Stage 4 Severe CKD (GFR = 15-29 mL/min/1 73 square meters)    Stage 5 End Stage CKD (GFR <15 mL/min/1 73 square meters)  Note: GFR calculation is accurate only with a steady state creatinine    CBC and differential [[id number]] Collected: 08/28/22 2223    Lab Status: Final result Specimen: Blood from Arm, Right Updated: 08/28/22 2227     WBC 10 13 Thousand/uL      RBC 4 81 Million/uL      Hemoglobin 14 7 g/dL      Hematocrit 46 8 %      MCV 97 fL      MCH 30 6 pg      MCHC 31 4 g/dL      RDW 13 7 %      MPV 10 3 fL      Platelets 001 Thousands/uL      nRBC 0 /100 WBCs      Neutrophils Relative 75 %      Immat GRANS % 0 %      Lymphocytes Relative 15 %      Monocytes Relative 9 %      Eosinophils Relative 1 %      Basophils Relative 0 %      Neutrophils Absolute 7 51 Thousands/µL      Immature Grans Absolute 0 04 Thousand/uL      Lymphocytes Absolute 1 56 Thousands/µL      Monocytes Absolute 0 90 Thousand/µL      Eosinophils Absolute 0 10 Thousand/µL      Basophils Absolute 0 02 Thousands/µL                  US scrotum and testicles   Final Result by Kamari Lambert DO (08/28 8585)       Increased Doppler flow within the right epididymis which may represent epididymitis  Follow-up with urology is recommended  Right-sided hydrocele  The study was marked in [facility] for immediate notification  Workstation performed: [id number]                    Procedures  Procedures         ED Course                                             MDM  Number of Diagnoses or Management Options  Epididymitis: new and requires workup     Amount and/or Complexity of Data Reviewed  Clinical lab tests: reviewed and ordered  Tests in the radiology section of CPT®: ordered and reviewed        Disposition  Final diagnoses:   Epididymitis     Time reflects when diagnosis was documented in both MDM as applicable and the Disposition within this note     Time User Action Codes Description Comment    8/28/2022 11:40 PM Rahul Li Add [N45 1] Epididymitis       ED Disposition     ED Disposition   Discharge    Condition   Stable    Date/Time   Sun Aug 28, 2022 11:42 PM    Comment   Ema Cooper Welcome discharge to home/self care                 Follow-up Information     Follow up With Specialties Details Why Beverly Schmidt MD Urology   [address]  [phone]            Discharge Medication List as of 8/28/2022 11:42 PM      START taking these medications    Details   ciprofloxacin (CIPRO) 500 mg tablet Take 1 tablet (500 mg total) by mouth every 12 (twelve) hours for 10 days, Starting Sun 8/28/2022, Until Wed 9/7/2022, Print      HYDROcodone-acetaminophen (Norco) 5-325 mg per tablet Take 1 tablet by mouth every 6 (six) hours as needed for pain for up to 10 days Max Daily Amount: 4 tablets, Starting Sun 8/28/2022, Until Wed 9/7/2022 at 2359, Print      ondansetron (ZOFRAN-ODT) 4 mg disintegrating tablet Take 1 tablet (4 mg total) by mouth every 8 (eight) hours as needed for nausea or vomiting, Starting Sun 8/28/2022, Print         CONTINUE these medications which have NOT CHANGED    Details   amLODIPine-benazepril (LOTREL) 10-40 MG per capsule Take 1 capsule by mouth daily, Historical Med      cloNIDine (CATAPRES-TTS-3) 0 3 mg/24 hr Place 1 patch (0 3 mg total) on the skin once a week, Starting Fri 3/11/2022, Normal      lidocaine (Lidoderm) 5 % Apply 1 patch topically daily Remove & Discard patch within 12 hours or as directed by MD, Starting Fri 7/8/2022, Normal      multivitamin (THERAGRAN) TABS Take 1 tablet by mouth daily, Historical Med      Omega-3 Fatty Acids (fish oil) 1,000 mg Take 1,000 mg by mouth daily, Historical Med                 PDMP Review     None          ED Provider  Electronically Signed by           Angel Villanueva DO  08/29/22 0522

## 2022-08-31 LAB — BACTERIA UR CULT: ABNORMAL

## 2022-09-01 ENCOUNTER — TELEPHONE (OUTPATIENT)
Dept: UROLOGY | Facility: AMBULATORY SURGERY CENTER | Age: 57
End: 2022-09-01

## 2022-09-01 NOTE — TELEPHONE ENCOUNTER
Please Triage  New Patient    What is the reason for the patients appointment? Patient went to ED on 8/28/22 for swollen testicles and is still having a lot of pain and the swelling that has not gotten any better he stated  What office location does the patient prefer? [place]    Imaging/Lab Results: US scrotum and testicles    IMPRESSION:     Increased Doppler flow within the right epididymis which may represent epididymitis  Follow-up with urology is recommended      Right-sided hydrocele  Do we accept the patient's insurance or is the patient Self-Pay? Yes, [employer] Provider:  Plan Type/Number:  Member ID#: Has the patient had any previous Urologist(s)? N/a    Have patient records been requested? If not are records showing in Epic:     Has the patient had any outside testing done? Abnormal Urine culture and urine microscopic    Does the patient have a personal history of cancer? No    Please review for scheduling since there are no new patient appointments until the end of September      Patient can be reached at [phone]

## 2022-09-06 ENCOUNTER — OFFICE VISIT (OUTPATIENT)
Dept: UROLOGY | Facility: CLINIC | Age: 57
End: 2022-09-06
Payer: COMMERCIAL

## 2022-09-06 VITALS
DIASTOLIC BLOOD PRESSURE: 90 MMHG | BODY MASS INDEX: 33.66 KG/M2 | HEART RATE: 92 BPM | HEIGHT: 73 IN | OXYGEN SATURATION: 95 % | WEIGHT: 254 LBS | SYSTOLIC BLOOD PRESSURE: 142 MMHG

## 2022-09-06 DIAGNOSIS — E83.9 CHRONIC KIDNEY DISEASE-MINERAL AND BONE DISORDER: ICD-10-CM

## 2022-09-06 DIAGNOSIS — N45.1 EPIDIDYMITIS: ICD-10-CM

## 2022-09-06 DIAGNOSIS — N18.9 CHRONIC KIDNEY DISEASE-MINERAL AND BONE DISORDER: ICD-10-CM

## 2022-09-06 DIAGNOSIS — N18.31 STAGE 3A CHRONIC KIDNEY DISEASE (HCC): ICD-10-CM

## 2022-09-06 DIAGNOSIS — M89.9 CHRONIC KIDNEY DISEASE-MINERAL AND BONE DISORDER: ICD-10-CM

## 2022-09-06 DIAGNOSIS — I10 ESSENTIAL HYPERTENSION: Primary | ICD-10-CM

## 2022-09-06 LAB
POST-VOID RESIDUAL VOLUME, ML POC: 36 ML
SL AMB  POCT GLUCOSE, UA: NORMAL
SL AMB LEUKOCYTE ESTERASE,UA: NORMAL
SL AMB POCT BILIRUBIN,UA: NORMAL
SL AMB POCT BLOOD,UA: NORMAL
SL AMB POCT CLARITY,UA: CLEAR
SL AMB POCT COLOR,UA: NORMAL
SL AMB POCT KETONES,UA: NORMAL
SL AMB POCT NITRITE,UA: NORMAL
SL AMB POCT PH,UA: 5
SL AMB POCT SPECIFIC GRAVITY,UA: 1.02
SL AMB POCT URINE PROTEIN: NORMAL
SL AMB POCT UROBILINOGEN: 0.2

## 2022-09-06 PROCEDURE — 81002 URINALYSIS NONAUTO W/O SCOPE: CPT | Performed by: PHYSICIAN ASSISTANT

## 2022-09-06 PROCEDURE — 99203 OFFICE O/P NEW LOW 30 MIN: CPT | Performed by: PHYSICIAN ASSISTANT

## 2022-09-06 PROCEDURE — 3077F SYST BP >= 140 MM HG: CPT | Performed by: PHYSICIAN ASSISTANT

## 2022-09-06 PROCEDURE — 3080F DIAST BP >= 90 MM HG: CPT | Performed by: PHYSICIAN ASSISTANT

## 2022-09-06 PROCEDURE — 51798 US URINE CAPACITY MEASURE: CPT | Performed by: PHYSICIAN ASSISTANT

## 2022-09-06 NOTE — PROGRESS NOTES
9/6/2022      Chief Complaint   Patient presents with    New Patient Visit     Swollen testis, pain     Assessment and Plan    59 y o  male new patient    1  Right epididymo-orchitis  - US from 8/28/22 - increased Doppler flow within the right epididymis which may represent epididymitis and right-sided hydrocele  - Recently completed 10 day course of ciprofloxacin   - C/o some residual pain and swelling  - Exam unremarkable for acute findings today  - Recommend scrotal elevation, NSAIDs, and ice application prn    - Consider right hydrocelectomy if persistent swelling    2  Prostate cancer screening  - Recent PSA normal at 1 9  - SALONI at next viist    - F/u in 4 weeks for symptom reassessment       History of Present Illness  Jayme Yang is a 59 y o  male here for new patient evaluation of right testicular pain and swelling  He was seen in the ED with this complaint on 08/28/2022  Urine culture was positive for E coli  Gonorrhea chlamydia testing was negative  Ultrasound completed was suspicious for epididymitis  He was discharged with 10 day course of ciprofloxacin  He states symptoms have improved since completing antibiotic  He notes some residual right scrotal discomfort and swelling  He denies any prior history of recurrent UTIs or epididymitis  He denies any urinary complaints  No fever or chills  Denies any scrotal erythema      PSA from 5/28/22 was 1 9  No family history of  malignancy  Review of Systems   Constitutional: Negative for chills and fever  Respiratory: Negative for shortness of breath  Cardiovascular: Negative for chest pain  Gastrointestinal: Negative for abdominal pain  Genitourinary: Positive for scrotal swelling and testicular pain  Negative for difficulty urinating, dysuria, flank pain, frequency, hematuria and urgency  Neurological: Negative for dizziness         Past Medical History  Past Medical History:   Diagnosis Date    Chronic kidney disease     Colon polyp     Epididymitis 8/18/22    Hypertension        Past Social History  Past Surgical History:   Procedure Laterality Date    COLONOSCOPY       Social History     Tobacco Use   Smoking Status Never Smoker   Smokeless Tobacco Never Used       Past Family History  Family History   Problem Relation Age of Onset    Hypertension Mother     No Known Problems Father     Cancer Sister     Cancer Brother     No Known Problems Brother     No Known Problems Brother        Past Social history  Social History     Socioeconomic History    Marital status: /Civil Union     Spouse name: Not on file    Number of children: Not on file    Years of education: Not on file    Highest education level: Not on file   Occupational History    Not on file   Tobacco Use    Smoking status: Never Smoker    Smokeless tobacco: Never Used   Vaping Use    Vaping Use: Never used   Substance and Sexual Activity    Alcohol use: Yes     Comment: Once a week    Drug use: Never    Sexual activity: Yes     Partners: Female   Other Topics Concern    Not on file   Social History Narrative    Not on file     Social Determinants of Health     Financial Resource Strain: Not on file   Food Insecurity: Not on file   Transportation Needs: Not on file   Physical Activity: Not on file   Stress: Not on file   Social Connections: Not on file   Intimate Partner Violence: Not on file   Housing Stability: Not on file       Current Medications  Current Outpatient Medications   Medication Sig Dispense Refill    amLODIPine-benazepril (LOTREL) 10-40 MG per capsule Take 1 capsule by mouth daily      ciprofloxacin (CIPRO) 500 mg tablet Take 1 tablet (500 mg total) by mouth every 12 (twelve) hours for 10 days 20 tablet 0    cloNIDine (CATAPRES-TTS-3) 0 3 mg/24 hr Place 1 patch (0 3 mg total) on the skin once a week 12 patch 3    HYDROcodone-acetaminophen (Norco) 5-325 mg per tablet Take 1 tablet by mouth every 6 (six) hours as needed for pain for up to 10 days Max Daily Amount: 4 tablets 12 tablet 0    lidocaine (Lidoderm) 5 % Apply 1 patch topically daily Remove & Discard patch within 12 hours or as directed by MD 10 patch 0    multivitamin (THERAGRAN) TABS Take 1 tablet by mouth daily      Omega-3 Fatty Acids (fish oil) 1,000 mg Take 1,000 mg by mouth daily      ondansetron (ZOFRAN-ODT) 4 mg disintegrating tablet Take 1 tablet (4 mg total) by mouth every 8 (eight) hours as needed for nausea or vomiting 20 tablet 0     No current facility-administered medications for this visit  Allergies  No Known Allergies      The following portions of the patient's history were reviewed and updated as appropriate: allergies, current medications, past medical history, past social history, past surgical history and problem list       Vitals  Vitals:    09/06/22 1325   BP: 142/90   BP Location: Left arm   Patient Position: Sitting   Cuff Size: Adult   Pulse: 92   SpO2: 95%   Weight: 115 kg (254 lb)   Height: 6' 1" (1 854 m)           Physical Exam  Physical Exam  Constitutional:       Appearance: Normal appearance  HENT:      Head: Normocephalic and atraumatic  Right Ear: External ear normal       Left Ear: External ear normal    Eyes:      General: No scleral icterus  Conjunctiva/sclera: Conjunctivae normal    Cardiovascular:      Pulses: Normal pulses  Pulmonary:      Effort: Pulmonary effort is normal    Genitourinary:     Comments: Right hydrocele  No scrotal edema or erythema  No significant tenderness to palpation  No palpable masses or abscess  Musculoskeletal:         General: Normal range of motion  Cervical back: Normal range of motion  Skin:     General: Skin is warm and dry  Neurological:      General: No focal deficit present  Mental Status: He is alert and oriented to person, place, and time  Psychiatric:         Mood and Affect: Mood normal          Behavior: Behavior normal          Thought Content:  Thought content normal          Judgment: Judgment normal             Results  Recent Results (from the past 1 hour(s))   POCT Measure PVR    Collection Time: 09/06/22  1:38 PM   Result Value Ref Range    POST-VOID RESIDUAL VOLUME, ML POC 36 mL   POCT urine dip    Collection Time: 09/06/22  1:38 PM   Result Value Ref Range    LEUKOCYTE ESTERASE,UA -     NITRITE,UA -     SL AMB POCT UROBILINOGEN 0 2     POCT URINE PROTEIN +      PH,UA 5     BLOOD,UA +     SPECIFIC GRAVITY,UA 1 025     KETONES,UA -     BILIRUBIN,UA -     GLUCOSE, UA -      COLOR,UA sascha     CLARITY,UA clear    ]  Lab Results   Component Value Date    PSA 1 9 05/28/2022     Lab Results   Component Value Date    CALCIUM 8 7 08/28/2022    K 4 4 08/28/2022    CO2 29 08/28/2022     08/28/2022    BUN 15 08/28/2022    CREATININE 1 62 (H) 08/28/2022     Lab Results   Component Value Date    WBC 10 13 08/28/2022    HGB 14 7 08/28/2022    HCT 46 8 08/28/2022    MCV 97 08/28/2022     08/28/2022           Orders  Orders Placed This Encounter   Procedures    POCT Measure PVR    POCT urine dip     Maria D Carrion PA-C

## 2022-10-05 NOTE — PROGRESS NOTES
10/7/2022      Chief Complaint   Patient presents with    Follow-up     Assessment and Plan    1  Right epididymoorchitis  - US from 8/28/22 - increased Doppler flow within the right epididymis which may represent epididymitis and right-sided hydrocele  - Was treated with course of Cipro  - Exam - no significant tenderness, swelling, or erythema  2  Prostate cancer screening  - Recent PSA normal at 1 9  - SALONI unremarkable  - urine dip today did show small amount of blood  Will send for UA micro  Should this be positive for 3 more RBCs per high-power field could pursue further workup     - otherwise patient will follow-up in 1 year from prior PSA for routine yearly prostate cancer screening  History of Present Illness  Francisco Feliciano is a 59 y o  male here for follow up evaluation of  epididymitis  He was seen in the ED on 08/28/2022 with right testicular pain and swelling  Urine culture at that time was positive for E coli  Ultrasound showed findings suspicious for right epididymitis  He was treated with 10 day course of ciprofloxacin  He presents today for symptom reassessment  He denies any prior history of recurrent UTIs or epididymitis  Recent PSA from 05/28/2022 was 1 9  He has no family history of  malignancy  He states his symptoms have nearly resolved and denies any significant testicular pain  Denies any scrotal erythema or swelling  Denies any urinary complaints  Review of Systems   Constitutional: Negative for chills and fever  Respiratory: Negative for shortness of breath  Cardiovascular: Negative for chest pain  Gastrointestinal: Negative for abdominal pain  Genitourinary: Negative for difficulty urinating, dysuria, flank pain, frequency, hematuria, scrotal swelling, testicular pain and urgency  Neurological: Negative for dizziness                    Past Medical History  Past Medical History:   Diagnosis Date    Chronic kidney disease     Colon polyp     Epididymitis 8/18/22    Hypertension        Past Social History  Past Surgical History:   Procedure Laterality Date    COLONOSCOPY       Social History     Tobacco Use   Smoking Status Never Smoker   Smokeless Tobacco Never Used       Past Family History  Family History   Problem Relation Age of Onset    Hypertension Mother     No Known Problems Father     Cancer Sister     Cancer Brother     No Known Problems Brother     No Known Problems Brother        Past Social history  Social History     Socioeconomic History    Marital status: /Civil Union     Spouse name: Not on file    Number of children: Not on file    Years of education: Not on file    Highest education level: Not on file   Occupational History    Not on file   Tobacco Use    Smoking status: Never Smoker    Smokeless tobacco: Never Used   Vaping Use    Vaping Use: Never used   Substance and Sexual Activity    Alcohol use: Yes     Comment: Once a week    Drug use: Never    Sexual activity: Yes     Partners: Female   Other Topics Concern    Not on file   Social History Narrative    Not on file     Social Determinants of Health     Financial Resource Strain: Not on file   Food Insecurity: Not on file   Transportation Needs: Not on file   Physical Activity: Not on file   Stress: Not on file   Social Connections: Not on file   Intimate Partner Violence: Not on file   Housing Stability: Not on file       Current Medications  Current Outpatient Medications   Medication Sig Dispense Refill    amLODIPine-benazepril (LOTREL) 10-40 MG per capsule Take 1 capsule by mouth daily      cloNIDine (CATAPRES-TTS-3) 0 3 mg/24 hr Place 1 patch (0 3 mg total) on the skin once a week 12 patch 3    multivitamin (THERAGRAN) TABS Take 1 tablet by mouth daily      Omega-3 Fatty Acids (fish oil) 1,000 mg Take 1,000 mg by mouth daily      lidocaine (Lidoderm) 5 % Apply 1 patch topically daily Remove & Discard patch within 12 hours or as directed by MD (Patient not taking: Reported on 10/7/2022) 10 patch 0    ondansetron (ZOFRAN-ODT) 4 mg disintegrating tablet Take 1 tablet (4 mg total) by mouth every 8 (eight) hours as needed for nausea or vomiting (Patient not taking: Reported on 10/7/2022) 20 tablet 0     No current facility-administered medications for this visit  Allergies  No Known Allergies      The following portions of the patient's history were reviewed and updated as appropriate: allergies, current medications, past medical history, past social history, past surgical history and problem list       Vitals  Vitals:    10/07/22 0917   BP: 126/78   Pulse: 81   SpO2: 99%   Weight: 118 kg (260 lb)   Height: 6' 1" (1 854 m)           Physical Exam  Physical Exam  Constitutional:       Appearance: Normal appearance  HENT:      Head: Normocephalic and atraumatic  Right Ear: External ear normal       Left Ear: External ear normal    Eyes:      General: No scleral icterus  Conjunctiva/sclera: Conjunctivae normal    Cardiovascular:      Pulses: Normal pulses  Pulmonary:      Effort: Pulmonary effort is normal    Genitourinary:     Comments: No significant tenderness to palpation to the testicles  Slight tenderness to right testicle with deeper palpation  No concerning masses  No scrotal swelling or edema  Prostate without tenderness or nodules  Musculoskeletal:         General: Normal range of motion  Cervical back: Normal range of motion  Skin:     General: Skin is warm and dry  Neurological:      General: No focal deficit present  Mental Status: He is alert and oriented to person, place, and time  Psychiatric:         Mood and Affect: Mood normal          Behavior: Behavior normal          Thought Content:  Thought content normal          Judgment: Judgment normal            Results  Recent Results (from the past 1 hour(s))   POCT urine dip    Collection Time: 10/07/22  9:23 AM   Result Value Ref Range    LEUKOCYTE ESTERASE,UA -     NITRITE,UA -     SL AMB POCT UROBILINOGEN 0 2     POCT URINE PROTEIN +      PH,UA 6 0     BLOOD,UA +     SPECIFIC GRAVITY,UA 1 025     KETONES,UA -     BILIRUBIN,UA -     GLUCOSE, UA -      COLOR,UA yellow     CLARITY,UA clear    ]  Lab Results   Component Value Date    PSA 1 9 05/28/2022     Lab Results   Component Value Date    CALCIUM 8 7 08/28/2022    K 4 4 08/28/2022    CO2 29 08/28/2022     08/28/2022    BUN 15 08/28/2022    CREATININE 1 62 (H) 08/28/2022     Lab Results   Component Value Date    WBC 10 13 08/28/2022    HGB 14 7 08/28/2022    HCT 46 8 08/28/2022    MCV 97 08/28/2022     08/28/2022           Orders  Orders Placed This Encounter   Procedures    POCT urine dip       Gilberto Falcon

## 2022-10-06 ENCOUNTER — PATIENT MESSAGE (OUTPATIENT)
Dept: UROLOGY | Facility: CLINIC | Age: 57
End: 2022-10-06

## 2022-10-06 NOTE — TELEPHONE ENCOUNTER
From: Marilyn Castellanos Welcome  To: Ngoc Nesbitt  Sent: 10/6/2022 2:26 AM EDT  Subject: Question regarding Sl Amb Poct Measure Post Void Residual    Amarilys Lundberg  How are my test results ?

## 2022-10-07 ENCOUNTER — OFFICE VISIT (OUTPATIENT)
Dept: UROLOGY | Facility: CLINIC | Age: 57
End: 2022-10-07
Payer: COMMERCIAL

## 2022-10-07 VITALS
DIASTOLIC BLOOD PRESSURE: 78 MMHG | HEIGHT: 73 IN | OXYGEN SATURATION: 99 % | SYSTOLIC BLOOD PRESSURE: 126 MMHG | BODY MASS INDEX: 34.46 KG/M2 | HEART RATE: 81 BPM | WEIGHT: 260 LBS

## 2022-10-07 DIAGNOSIS — N45.1 EPIDIDYMITIS: Primary | ICD-10-CM

## 2022-10-07 DIAGNOSIS — Z12.5 PROSTATE CANCER SCREENING: ICD-10-CM

## 2022-10-07 LAB
BACTERIA UR QL AUTO: ABNORMAL /HPF
BILIRUB UR QL STRIP: NEGATIVE
CLARITY UR: CLEAR
COLOR UR: ABNORMAL
GLUCOSE UR STRIP-MCNC: NEGATIVE MG/DL
HGB UR QL STRIP.AUTO: ABNORMAL
HYALINE CASTS #/AREA URNS LPF: ABNORMAL /LPF
KETONES UR STRIP-MCNC: NEGATIVE MG/DL
LEUKOCYTE ESTERASE UR QL STRIP: NEGATIVE
NITRITE UR QL STRIP: NEGATIVE
NON-SQ EPI CELLS URNS QL MICRO: ABNORMAL /HPF
PH UR STRIP.AUTO: 5.5 [PH]
PROT UR STRIP-MCNC: ABNORMAL MG/DL
RBC #/AREA URNS AUTO: ABNORMAL /HPF
SL AMB  POCT GLUCOSE, UA: NORMAL
SL AMB LEUKOCYTE ESTERASE,UA: NORMAL
SL AMB POCT BILIRUBIN,UA: NORMAL
SL AMB POCT BLOOD,UA: NORMAL
SL AMB POCT CLARITY,UA: CLEAR
SL AMB POCT COLOR,UA: YELLOW
SL AMB POCT KETONES,UA: NORMAL
SL AMB POCT NITRITE,UA: NORMAL
SL AMB POCT PH,UA: 6
SL AMB POCT SPECIFIC GRAVITY,UA: 1.02
SL AMB POCT URINE PROTEIN: NORMAL
SL AMB POCT UROBILINOGEN: 0.2
SP GR UR STRIP.AUTO: 1.02 (ref 1–1.03)
UROBILINOGEN UR STRIP-ACNC: <2 MG/DL
WBC #/AREA URNS AUTO: ABNORMAL /HPF

## 2022-10-07 PROCEDURE — 81001 URINALYSIS AUTO W/SCOPE: CPT | Performed by: PHYSICIAN ASSISTANT

## 2022-10-07 PROCEDURE — 99213 OFFICE O/P EST LOW 20 MIN: CPT | Performed by: PHYSICIAN ASSISTANT

## 2022-10-07 PROCEDURE — 81002 URINALYSIS NONAUTO W/O SCOPE: CPT | Performed by: PHYSICIAN ASSISTANT

## 2022-10-10 ENCOUNTER — TELEPHONE (OUTPATIENT)
Dept: UROLOGY | Facility: CLINIC | Age: 57
End: 2022-10-10

## 2022-10-10 NOTE — TELEPHONE ENCOUNTER
Called patient l/m to call us back regarding the following     ----- Message from Lizzy Recinos PA-C sent at 10/10/2022  8:26 AM EDT -----  UA micro negative for significnat RBCs, no work-up indicated

## 2022-11-07 DIAGNOSIS — I10 ESSENTIAL HYPERTENSION: ICD-10-CM

## 2022-11-07 RX ORDER — CLONIDINE 0.3 MG/24H
1 PATCH, EXTENDED RELEASE TRANSDERMAL WEEKLY
Qty: 12 PATCH | Refills: 0 | Status: SHIPPED | OUTPATIENT
Start: 2022-11-07

## 2022-11-08 DIAGNOSIS — I10 ESSENTIAL HYPERTENSION: Primary | ICD-10-CM

## 2022-11-08 RX ORDER — AMLODIPINE BESYLATE AND BENAZEPRIL HYDROCHLORIDE 10; 40 MG/1; MG/1
1 CAPSULE ORAL DAILY
Qty: 90 CAPSULE | Refills: 3 | Status: SHIPPED | OUTPATIENT
Start: 2022-11-08 | End: 2023-02-06

## 2022-11-21 ENCOUNTER — OFFICE VISIT (OUTPATIENT)
Dept: INTERNAL MEDICINE CLINIC | Facility: CLINIC | Age: 57
End: 2022-11-21

## 2022-11-21 VITALS
HEART RATE: 75 BPM | RESPIRATION RATE: 16 BRPM | DIASTOLIC BLOOD PRESSURE: 80 MMHG | HEIGHT: 73 IN | OXYGEN SATURATION: 95 % | WEIGHT: 264.8 LBS | BODY MASS INDEX: 35.09 KG/M2 | SYSTOLIC BLOOD PRESSURE: 136 MMHG

## 2022-11-21 DIAGNOSIS — I10 ESSENTIAL HYPERTENSION: Primary | ICD-10-CM

## 2022-11-21 DIAGNOSIS — R09.81 CONGESTION OF NASAL SINUS: ICD-10-CM

## 2022-11-21 DIAGNOSIS — N18.31 STAGE 3A CHRONIC KIDNEY DISEASE (HCC): ICD-10-CM

## 2022-11-21 RX ORDER — AMLODIPINE BESYLATE AND BENAZEPRIL HYDROCHLORIDE 10; 40 MG/1; MG/1
1 CAPSULE ORAL DAILY
Qty: 90 CAPSULE | Refills: 3 | Status: SHIPPED | OUTPATIENT
Start: 2022-11-21 | End: 2023-02-19

## 2022-11-21 RX ORDER — CLONIDINE 0.3 MG/24H
1 PATCH, EXTENDED RELEASE TRANSDERMAL WEEKLY
Qty: 12 PATCH | Refills: 3 | Status: SHIPPED | OUTPATIENT
Start: 2022-11-21 | End: 2023-02-19

## 2022-11-21 RX ORDER — IBUPROFEN 600 MG/1
TABLET ORAL
COMMUNITY
Start: 2022-10-12

## 2022-11-21 NOTE — PROGRESS NOTES
Assessment/Plan:      Patient is here for six-month follow-up  Recently underwent surgery for a complete tear of his left rotator cuff  He is in physical therapy  Otherwise doing well  Has some congestion but no signs of infection noted  He will try over-the-counter treatment  Blood pressure looks okay  Continue same medications  Labs ordered  Quality Measures:     BMI Counseling: Body mass index is 34 94 kg/m²  The BMI is above normal  Nutrition recommendations include decreasing portion sizes and encouraging healthy choices of fruits and vegetables  Exercise recommendations include moderate physical activity 150 minutes/week  Rationale for BMI follow-up plan is due to patient being overweight or obese  Depression Screening and Follow-up Plan: Patient was screened for depression during today's encounter  They screened negative with a PHQ-2 score of 0  Return in about 6 months (around 5/21/2023)  No problem-specific Assessment & Plan notes found for this encounter  Diagnoses and all orders for this visit:    Essential hypertension  -     amLODIPine-benazepril (LOTREL) 10-40 MG per capsule; Take 1 capsule by mouth daily  -     cloNIDine (CATAPRES-TTS-3) 0 3 mg/24 hr; Place 1 patch (0 3 mg total) on the skin once a week  -     CBC and differential; Future  -     Comprehensive metabolic panel; Future  -     TSH, 3rd generation with Free T4 reflex; Future  -     Lipid Panel with Direct LDL reflex; Future  -     Hemoglobin A1C; Future    Stage 3a chronic kidney disease (HCC)    Congestion of nasal sinus    Other orders  -     ibuprofen (MOTRIN) 600 mg tablet; TAKE 1 TABLET BY MOUTH EVERY 8HRS AS NEEDED FOR MILD PAIN (SCORE 1-3)  Subjective:      Patient ID: Ashley Santos is a 59 y o  male  Here for follow up        ALLERGIES:  No Known Allergies    CURRENT MEDICATIONS:    Current Outpatient Medications:   •  amLODIPine-benazepril (LOTREL) 10-40 MG per capsule, Take 1 capsule by mouth daily, Disp: 90 capsule, Rfl: 3  •  cloNIDine (CATAPRES-TTS-3) 0 3 mg/24 hr, Place 1 patch (0 3 mg total) on the skin once a week, Disp: 12 patch, Rfl: 3  •  ibuprofen (MOTRIN) 600 mg tablet, TAKE 1 TABLET BY MOUTH EVERY 8HRS AS NEEDED FOR MILD PAIN (SCORE 1-3)  , Disp: , Rfl:   •  lidocaine (Lidoderm) 5 %, Apply 1 patch topically daily Remove & Discard patch within 12 hours or as directed by MD, Disp: 10 patch, Rfl: 0  •  multivitamin (THERAGRAN) TABS, Take 1 tablet by mouth daily, Disp: , Rfl:   •  Omega-3 Fatty Acids (fish oil) 1,000 mg, Take 1,000 mg by mouth daily, Disp: , Rfl:     ACTIVE PROBLEM LIST:  Patient Active Problem List   Diagnosis   • Stage 3a chronic kidney disease ([facility] [address] )   • Essential hypertension   • Chronic kidney disease-mineral and bone disorder   • History of colonic polyps       PAST MEDICAL HISTORY:  Past Medical History:   Diagnosis Date   • Chronic kidney disease    • Colon polyp    • Epididymitis 8/18/22   • Hypertension        PAST SURGICAL HISTORY:  Past Surgical History:   Procedure Laterality Date   • COLONOSCOPY     • SHOULDER SURGERY Left 10/2022       FAMILY HISTORY:  Family History   Problem Relation Age of Onset   • Hypertension Mother    • No Known Problems Father    • Cancer Sister    • Cancer Brother    • No Known Problems Brother    • No Known Problems Brother        SOCIAL HISTORY:  Social History     Socioeconomic History   • Marital status: /Civil Union     Spouse name: Not on file   • Number of children: Not on file   • Years of education: Not on file   • Highest education level: Not on file   Occupational History   • Not on file   Tobacco Use   • Smoking status: Never   • Smokeless tobacco: Never   Vaping Use   • Vaping Use: Never used   Substance and Sexual Activity   • Alcohol use: Yes     Comment: Once a week   • Drug use: Never   • Sexual activity: Yes     Partners: Female   Other Topics Concern   • Not on file   Social History Narrative   • Not on file     Social Determinants of Health     Financial Resource Strain: Not on file   Food Insecurity: Not on file   Transportation Needs: Not on file   Physical Activity: Not on file   Stress: Not on file   Social Connections: Not on file   Intimate Partner Violence: Not on file   Housing Stability: Not on file       Review of Systems   HENT: Positive for congestion and sneezing  Respiratory: Positive for cough  Musculoskeletal:        Left shoulder pain s/p rotator cuff tear   All other systems reviewed and are negative  Objective:  Vitals:    11/21/22 1606   BP: 136/80   BP Location: Right arm   Patient Position: Sitting   Cuff Size: Adult   Pulse: 75   Resp: 16   SpO2: 95%   Weight: 120 kg (264 lb 12 8 oz)   Height: 6' 1" (1 854 m)     Body mass index is 34 94 kg/m²  Physical Exam  Vitals and nursing note reviewed  Constitutional:       Appearance: Normal appearance  HENT:      Head: Normocephalic and atraumatic  Cardiovascular:      Rate and Rhythm: Normal rate and regular rhythm  Heart sounds: Normal heart sounds  Pulmonary:      Effort: Pulmonary effort is normal       Breath sounds: Normal breath sounds  Abdominal:      Palpations: Abdomen is soft  Musculoskeletal:         General: Normal range of motion  Cervical back: Normal range of motion  Lymphadenopathy:      Cervical: No cervical adenopathy  Skin:     General: Skin is warm and dry  Neurological:      General: No focal deficit present  Mental Status: He is alert and oriented to person, place, and time  Psychiatric:         Mood and Affect: Mood normal            RESULTS:    No results found for this or any previous visit (from the past 1008 hour(s))  This note was created with voice recognition software  Phonic, grammatical and spelling errors may be present within the note as a result

## 2023-01-16 ENCOUNTER — TELEPHONE (OUTPATIENT)
Dept: UROLOGY | Facility: CLINIC | Age: 58
End: 2023-01-16

## 2023-01-23 ENCOUNTER — APPOINTMENT (EMERGENCY)
Dept: CT IMAGING | Facility: HOSPITAL | Age: 58
End: 2023-01-23

## 2023-01-23 ENCOUNTER — HOSPITAL ENCOUNTER (EMERGENCY)
Facility: HOSPITAL | Age: 58
Discharge: HOME | End: 2023-01-23
Attending: EMERGENCY MEDICINE

## 2023-01-23 ENCOUNTER — TELEPHONE (OUTPATIENT)
Dept: NEPHROLOGY | Facility: CLINIC | Age: 58
End: 2023-01-23

## 2023-01-23 VITALS
DIASTOLIC BLOOD PRESSURE: 84 MMHG | HEART RATE: 57 BPM | RESPIRATION RATE: 18 BRPM | OXYGEN SATURATION: 94 % | TEMPERATURE: 98.1 F | SYSTOLIC BLOOD PRESSURE: 161 MMHG

## 2023-01-23 DIAGNOSIS — M54.9 BACK PAIN: Primary | ICD-10-CM

## 2023-01-23 RX ORDER — ACETAMINOPHEN 325 MG/1
975 TABLET ORAL ONCE
Status: COMPLETED | OUTPATIENT
Start: 2023-01-23 | End: 2023-01-23

## 2023-01-23 RX ORDER — METHYLPREDNISOLONE 4 MG/1
TABLET ORAL
Qty: 21 TABLET | Refills: 0 | Status: SHIPPED | OUTPATIENT
Start: 2023-01-23

## 2023-01-23 RX ORDER — LIDOCAINE 50 MG/G
1 PATCH TOPICAL ONCE
Status: DISCONTINUED | OUTPATIENT
Start: 2023-01-23 | End: 2023-01-23 | Stop reason: HOSPADM

## 2023-01-23 RX ADMIN — ACETAMINOPHEN 975 MG: 325 TABLET ORAL at 18:34

## 2023-01-23 RX ADMIN — LIDOCAINE 1 PATCH: 50 PATCH CUTANEOUS at 18:34

## 2023-01-23 NOTE — ED PROVIDER NOTES
History  Chief Complaint   Patient presents with   • Back Pain     Pt reports he has lower back pain that radiates down his right leg for months  He had a long drive today and the pain is getting worse  Patient is a 69-year-old male past medical history of CKD stage III, hypertension presenting with back pain  Patient notes back pain which has been intermittent for 1 year acutely worsened today  He states he is a  and has back pain which radiates from the midline of his low back down his right leg to his calf  He states that it is worse with driving which he has done today  States it is aching and intermittently sharp and feels numbness down the right leg  Denies any bowel or bladder incontinence, urinary retention, fevers, falls or injuries  Does not take any medication for it  Notes intermittent perirectal numbness for last several months and states he is not having any currently  Prior to Admission Medications   Prescriptions Last Dose Informant Patient Reported? Taking? Omega-3 Fatty Acids (fish oil) 1,000 mg   Yes No   Sig: Take 1,000 mg by mouth daily   amLODIPine-benazepril (LOTREL) 10-40 MG per capsule   No No   Sig: Take 1 capsule by mouth daily   cloNIDine (CATAPRES-TTS-3) 0 3 mg/24 hr   No No   Sig: Place 1 patch (0 3 mg total) on the skin once a week   ibuprofen (MOTRIN) 600 mg tablet   Yes No   Sig: TAKE 1 TABLET BY MOUTH EVERY 8HRS AS NEEDED FOR MILD PAIN (SCORE 1-3)     lidocaine (Lidoderm) 5 %   No No   Sig: Apply 1 patch topically daily Remove & Discard patch within 12 hours or as directed by MD   multivitamin (THERAGRAN) TABS   Yes No   Sig: Take 1 tablet by mouth daily      Facility-Administered Medications: None       Past Medical History:   Diagnosis Date   • Chronic kidney disease    • Colon polyp    • Epididymitis 8/18/22   • Hypertension        Past Surgical History:   Procedure Laterality Date   • COLONOSCOPY     • SHOULDER SURGERY Left 10/2022       Family History   Problem Relation Age of Onset   • Hypertension Mother    • No Known Problems Father    • Cancer Sister    • Cancer Brother    • No Known Problems Brother    • No Known Problems Brother      I have reviewed and agree with the history as documented  E-Cigarette/Vaping   • E-Cigarette Use Never User      E-Cigarette/Vaping Substances     Social History     Tobacco Use   • Smoking status: Never   • Smokeless tobacco: Never   Vaping Use   • Vaping Use: Never used   Substance Use Topics   • Alcohol use: Yes     Comment: Once a week   • Drug use: Never       Review of Systems   All other systems reviewed and are negative  Physical Exam  Physical Exam  Vitals reviewed  Constitutional:       General: He is not in acute distress  Appearance: Normal appearance  He is not ill-appearing  HENT:      Mouth/Throat:      Mouth: Mucous membranes are moist    Eyes:      Conjunctiva/sclera: Conjunctivae normal    Cardiovascular:      Rate and Rhythm: Normal rate  Pulmonary:      Effort: Pulmonary effort is normal    Abdominal:      General: Abdomen is flat  Musculoskeletal:         General: No swelling  Normal range of motion  Cervical back: Neck supple  Comments: L4-5 S1 tenderness, L1 tenderness, negative straight leg raise bilaterally, ambulatory at bedside with steady gait with intact distal motor, sensation   Skin:     General: Skin is warm and dry  Neurological:      General: No focal deficit present  Mental Status: He is alert  Sensory: No sensory deficit  Motor: No weakness        Gait: Gait normal    Psychiatric:         Mood and Affect: Mood normal          Vital Signs  ED Triage Vitals [01/23/23 1758]   Temperature Pulse Respirations Blood Pressure SpO2   98 1 °F (36 7 °C) 57 18 161/84 94 %      Temp Source Heart Rate Source Patient Position - Orthostatic VS BP Location FiO2 (%)   Tympanic Monitor Sitting Left arm --      Pain Score       10 - Worst Possible Pain Vitals:    01/23/23 1758   BP: 161/84   Pulse: 57   Patient Position - Orthostatic VS: Sitting         Visual Acuity      ED Medications  Medications   acetaminophen (TYLENOL) tablet 975 mg (has no administration in time range)   lidocaine (LIDODERM) 5 % patch 1 patch (has no administration in time range)       Diagnostic Studies  Results Reviewed     None                 No orders to display              Procedures  Procedures         ED Course  ED Course as of 01/23/23 2226 Mon Jan 23, 2023 1956 Post void residual of 12 2009 No central canal stenosis   2009 Intact rectal tone and sensation, will discharge with Medrol Dosepak, I discussed return precautions and outpatient follow-up and patient states he understands  Medical Decision Making  Patient is a 57-year-old male past medical history of CKD stage III, hypertension presenting with back pain concerning for lumbar radiculopathy  Patient is well-appearing at bedside with stable vitals and in no acute distress  He has no sensory deficits on exam, no lower extremity motor deficits and is ambulatory at bedside with steady gait with L4-L5 tenderness, some L1 tenderness, negative straight leg raise bilaterally  As patient does note intermittent saddle anesthesia will perform rectal exam, rectal tone and sensation, and obtain CT lumbar spine, give pain control and reassess  Amount and/or Complexity of Data Reviewed  Radiology: ordered  Risk  OTC drugs  Prescription drug management  Disposition  Final diagnoses:   None     ED Disposition     None      Follow-up Information    None         Patient's Medications   Discharge Prescriptions    No medications on file       No discharge procedures on file      PDMP Review     None          ED Provider  Electronically Signed by           Cynthia Harry DO  01/23/23 2226

## 2023-01-24 NOTE — ED NOTES
Pt unable to void at this time  Pt provided water at this time per provider        Gloria Redding, KEVIN  01/23/23 3868

## 2023-01-25 ENCOUNTER — NURSE TRIAGE (OUTPATIENT)
Dept: PHYSICAL THERAPY | Facility: OTHER | Age: 58
End: 2023-01-25

## 2023-01-25 DIAGNOSIS — M54.50 ACUTE EXACERBATION OF CHRONIC LOW BACK PAIN: Primary | ICD-10-CM

## 2023-01-25 DIAGNOSIS — G89.29 ACUTE EXACERBATION OF CHRONIC LOW BACK PAIN: Primary | ICD-10-CM

## 2023-01-25 NOTE — TELEPHONE ENCOUNTER
Additional Information  • Negative: Is this related to a work injury? • Negative: Is this related to an MVA? • Negative: Are you currently recieving homecare services? • Negative: Has the patient had unexplained weight loss? • Negative: Does the patient have a fever? • Negative: Is the patient experiencing blood in sputum? • Negative: Has the patient experienced major trauma? (fall from height, high speed collision, direct blow to spine) and is also experiencing nausea, light-headedness, or loss of consciousness? • Negative: Is the patient experiencing urine retention? • Negative: Is the patient experiencing acute drop foot or paralysis? • Affirmative: Is this a chronic condition? Background - Initial Assessment  Clinical complaint: Right sided low back pain that can radiate to R hip and RLE  Date of onset: Pt has HX of intermittent LBP- Patient noticed acute worsening about 2 weeks ago- Warranted ED visit  Frequency of pain: constant  Quality of pain: sharp    Protocols used: [facility] PROTOCOL  Patient seen in ED 1/23/23-PLEASE SEE NOTES  This RN did review the Comprehensive Spine Program in detail and what we offer for his back or neck pain  Nurse reviewed prior referrals, interventions, and recent ED visit  Patient was seen by [facility] and participated in PT last year  Patient suffered a work related shoulder injury ([employer] [facility]) that required Orthopedic surgical procedure & therapy  This hindered patients need to follow through with tx for chronic back pain  Nurse explained triage and referral placement for [facility] The[facility]ist to him  Patient stated his back pain has worsened over the past few weeks and "really need to do something"  This RN mentioned his prior consult and f/u with [facility]  Nurse recommended he reach out to schedule any necessary appointments       Patient is agreeable and would prefer to participate in triage and would like to proceed w/ Evaluation/Sessions with [facility] therapist  Patient to discuss appropriate tx/plan of care and other necessary interventions at that time  Nurse agreed and Triaged for acute on chronic  NO RF s/s present  Patients information was sent to the preferred site and patient made aware clerical would be calling to schedule appointment  Contact information for the [facility] site was provided for the patient as well  Nurse also offered a call from the [facility] counselor d/t SBT score  Patient declined  Patient was pleasant and appropriate during this encounter  Patient did not voice any questions and/or concerns  All information about patients intended careplan reviewed and the patient is in agreement with this RN's explaination of treatment plan  Patient appreciative of call and referral placement  Nurse wished patient well and CSP referral closed per protocol

## 2023-01-26 ENCOUNTER — APPOINTMENT (OUTPATIENT)
Dept: LAB | Facility: HOSPITAL | Age: 58
End: 2023-01-26

## 2023-01-26 DIAGNOSIS — N18.31 STAGE 3A CHRONIC KIDNEY DISEASE (HCC): ICD-10-CM

## 2023-01-26 DIAGNOSIS — N18.9 CHRONIC KIDNEY DISEASE-MINERAL AND BONE DISORDER: ICD-10-CM

## 2023-01-26 DIAGNOSIS — E83.9 CHRONIC KIDNEY DISEASE-MINERAL AND BONE DISORDER: ICD-10-CM

## 2023-01-26 DIAGNOSIS — M89.9 CHRONIC KIDNEY DISEASE-MINERAL AND BONE DISORDER: ICD-10-CM

## 2023-01-26 LAB
25(OH)D3 SERPL-MCNC: 21.4 NG/ML (ref 30–100)
ANION GAP SERPL CALCULATED.3IONS-SCNC: 8 MMOL/L (ref 4–13)
BACTERIA UR QL AUTO: ABNORMAL /HPF
BASOPHILS # BLD AUTO: 0.03 THOUSANDS/ÂΜL (ref 0–0.1)
BASOPHILS NFR BLD AUTO: 1 % (ref 0–1)
BILIRUB UR QL STRIP: NEGATIVE
BUN SERPL-MCNC: 17 MG/DL (ref 5–25)
CALCIUM SERPL-MCNC: 8.8 MG/DL (ref 8.3–10.1)
CHLORIDE SERPL-SCNC: 103 MMOL/L (ref 96–108)
CLARITY UR: CLEAR
CO2 SERPL-SCNC: 29 MMOL/L (ref 21–32)
COLOR UR: ABNORMAL
CREAT SERPL-MCNC: 1.56 MG/DL (ref 0.6–1.3)
CREAT UR-MCNC: 233 MG/DL
EOSINOPHIL # BLD AUTO: 0.18 THOUSAND/ÂΜL (ref 0–0.61)
EOSINOPHIL NFR BLD AUTO: 4 % (ref 0–6)
ERYTHROCYTE [DISTWIDTH] IN BLOOD BY AUTOMATED COUNT: 13.9 % (ref 11.6–15.1)
GFR SERPL CREATININE-BSD FRML MDRD: 47 ML/MIN/1.73SQ M
GLUCOSE P FAST SERPL-MCNC: 132 MG/DL (ref 65–99)
GLUCOSE UR STRIP-MCNC: NEGATIVE MG/DL
HCT VFR BLD AUTO: 45 % (ref 36.5–49.3)
HGB BLD-MCNC: 14.5 G/DL (ref 12–17)
HGB UR QL STRIP.AUTO: ABNORMAL
IMM GRANULOCYTES # BLD AUTO: 0.02 THOUSAND/UL (ref 0–0.2)
IMM GRANULOCYTES NFR BLD AUTO: 1 % (ref 0–2)
KETONES UR STRIP-MCNC: NEGATIVE MG/DL
LEUKOCYTE ESTERASE UR QL STRIP: NEGATIVE
LYMPHOCYTES # BLD AUTO: 1.6 THOUSANDS/ÂΜL (ref 0.6–4.47)
LYMPHOCYTES NFR BLD AUTO: 37 % (ref 14–44)
MCH RBC QN AUTO: 29.2 PG (ref 26.8–34.3)
MCHC RBC AUTO-ENTMCNC: 32.2 G/DL (ref 31.4–37.4)
MCV RBC AUTO: 91 FL (ref 82–98)
MONOCYTES # BLD AUTO: 0.35 THOUSAND/ÂΜL (ref 0.17–1.22)
MONOCYTES NFR BLD AUTO: 8 % (ref 4–12)
NEUTROPHILS # BLD AUTO: 2.17 THOUSANDS/ÂΜL (ref 1.85–7.62)
NEUTS SEG NFR BLD AUTO: 49 % (ref 43–75)
NITRITE UR QL STRIP: NEGATIVE
NON-SQ EPI CELLS URNS QL MICRO: ABNORMAL /HPF
NRBC BLD AUTO-RTO: 0 /100 WBCS
PH UR STRIP.AUTO: 5.5 [PH]
PHOSPHATE SERPL-MCNC: 3.1 MG/DL (ref 2.3–4.1)
PLATELET # BLD AUTO: 200 THOUSANDS/UL (ref 149–390)
PMV BLD AUTO: 10.1 FL (ref 8.9–12.7)
POTASSIUM SERPL-SCNC: 4.2 MMOL/L (ref 3.5–5.3)
PROT UR STRIP-MCNC: ABNORMAL MG/DL
PROT UR-MCNC: 74 MG/DL
PROT/CREAT UR: 0.32 MG/G{CREAT} (ref 0–0.1)
PTH-INTACT SERPL-MCNC: 59.6 PG/ML (ref 18.4–80.1)
RBC # BLD AUTO: 4.96 MILLION/UL (ref 3.88–5.62)
RBC #/AREA URNS AUTO: ABNORMAL /HPF
SODIUM SERPL-SCNC: 140 MMOL/L (ref 135–147)
SP GR UR STRIP.AUTO: 1.02 (ref 1–1.03)
UROBILINOGEN UR STRIP-ACNC: <2 MG/DL
WBC # BLD AUTO: 4.35 THOUSAND/UL (ref 4.31–10.16)
WBC #/AREA URNS AUTO: ABNORMAL /HPF

## 2023-01-30 ENCOUNTER — TELEPHONE (OUTPATIENT)
Dept: NEPHROLOGY | Facility: CLINIC | Age: 58
End: 2023-01-30

## 2023-01-30 NOTE — TELEPHONE ENCOUNTER
I called patient to  confirm appointment for tomorrow with Dr Virgen unable to leave messsage mail box is full and can not receive any messages

## 2023-01-31 ENCOUNTER — OFFICE VISIT (OUTPATIENT)
Dept: NEPHROLOGY | Facility: CLINIC | Age: 58
End: 2023-01-31

## 2023-01-31 VITALS
WEIGHT: 264 LBS | SYSTOLIC BLOOD PRESSURE: 120 MMHG | TEMPERATURE: 97.2 F | DIASTOLIC BLOOD PRESSURE: 70 MMHG | RESPIRATION RATE: 16 BRPM | OXYGEN SATURATION: 97 % | BODY MASS INDEX: 33.88 KG/M2 | HEART RATE: 85 BPM | HEIGHT: 74 IN

## 2023-01-31 DIAGNOSIS — Z86.010 HISTORY OF COLONIC POLYPS: ICD-10-CM

## 2023-01-31 DIAGNOSIS — E83.9 CHRONIC KIDNEY DISEASE-MINERAL AND BONE DISORDER: ICD-10-CM

## 2023-01-31 DIAGNOSIS — N18.31 STAGE 3A CHRONIC KIDNEY DISEASE (HCC): Primary | ICD-10-CM

## 2023-01-31 DIAGNOSIS — I10 ESSENTIAL HYPERTENSION: ICD-10-CM

## 2023-01-31 DIAGNOSIS — N18.9 CHRONIC KIDNEY DISEASE-MINERAL AND BONE DISORDER: ICD-10-CM

## 2023-01-31 DIAGNOSIS — M89.9 CHRONIC KIDNEY DISEASE-MINERAL AND BONE DISORDER: ICD-10-CM

## 2023-01-31 NOTE — ASSESSMENT & PLAN NOTE
Lab Results   Component Value Date    EGFR 47 01/26/2023    EGFR 45 08/28/2022    EGFR 51 07/29/2022    CREATININE 1 56 (H) 01/26/2023    CREATININE 1 62 (H) 08/28/2022    CREATININE 1 48 (H) 07/29/2022   PTH and phosphorus along with vitamin D are within acceptable range and will continue to monitor

## 2023-01-31 NOTE — ASSESSMENT & PLAN NOTE
Lab Results   Component Value Date    EGFR 47 01/26/2023    EGFR 45 08/28/2022    EGFR 51 07/29/2022    CREATININE 1 56 (H) 01/26/2023    CREATININE 1 62 (H) 08/28/2022    CREATININE 1 48 (H) 07/29/2022   Renal function is quite stable at this point    Advise hydration and avoiding nephrotoxic medication

## 2023-01-31 NOTE — PROGRESS NOTES
NEPHROLOGY OFFICE FOLLOW UP  Casper Purcell 59 y o  male MRN: [id number]    Encounter: [id number] 1/31/2023    REASON FOR VISIT: Casper Purcell is a 59 y o  male who is here on 1/31/2023 for Chronic Kidney Disease and Follow-up    HPI:    Neeraj Dominguez came in today for follow-up of CKD  25-year-old gentleman with hypertension and CKD    Overall doing well    Has back pain with some radiation but denies take any nausea painkiller    No chest pain no palpitation    No shortness of breath    Denies any urinary complaint      REVIEW OF SYSTEMS:    Review of Systems   Constitutional: Negative for activity change and fatigue  HENT: Negative for congestion and ear discharge  Eyes: Negative for photophobia and pain  Respiratory: Negative for apnea and choking  Cardiovascular: Negative for chest pain and palpitations  Gastrointestinal: Negative for abdominal distention and blood in stool  Endocrine: Negative for heat intolerance and polyphagia  Genitourinary: Negative for flank pain and urgency  Musculoskeletal: Negative for neck pain and neck stiffness  Skin: Negative for color change and wound  Allergic/Immunologic: Negative for food allergies and immunocompromised state  Neurological: Negative for seizures and facial asymmetry  Hematological: Negative for adenopathy  Does not bruise/bleed easily  Psychiatric/Behavioral: Negative for self-injury and suicidal ideas           PAST MEDICAL HISTORY:  Past Medical History:   Diagnosis Date   • Chronic kidney disease    • Colon polyp    • Epididymitis 8/18/22   • Hypertension        PAST SURGICAL HISTORY:  Past Surgical History:   Procedure Laterality Date   • COLONOSCOPY     • SHOULDER SURGERY Left 10/2022       SOCIAL HISTORY:  Social History     Substance and Sexual Activity   Alcohol Use Yes    Comment: Once a week     Social History     Substance and Sexual Activity   Drug Use Never     Social History     Tobacco Use   Smoking Status Never Smokeless Tobacco Never       FAMILY HISTORY:  Family History   Problem Relation Age of Onset   • Hypertension Mother    • No Known Problems Father    • Cancer Sister    • Cancer Brother    • No Known Problems Brother    • No Known Problems Brother        MEDICATIONS:    Current Outpatient Medications:   •  amLODIPine-benazepril (LOTREL) 10-40 MG per capsule, Take 1 capsule by mouth daily, Disp: 90 capsule, Rfl: 3  •  cloNIDine (CATAPRES-TTS-3) 0 3 mg/24 hr, Place 1 patch (0 3 mg total) on the skin once a week, Disp: 12 patch, Rfl: 3  •  lidocaine (Lidoderm) 5 %, Apply 1 patch topically daily Remove & Discard patch within 12 hours or as directed by MD, Disp: 10 patch, Rfl: 0  •  multivitamin (THERAGRAN) TABS, Take 1 tablet by mouth daily, Disp: , Rfl:   •  Omega-3 Fatty Acids (fish oil) 1,000 mg, Take 1,000 mg by mouth daily, Disp: , Rfl:   •  ibuprofen (MOTRIN) 600 mg tablet, TAKE 1 TABLET BY MOUTH EVERY 8HRS AS NEEDED FOR MILD PAIN (SCORE 1-3)  (Patient not taking: Reported on 1/31/2023), Disp: , Rfl:   •  methylPREDNISolone 4 MG tablet therapy pack, Use as directed on package (Patient not taking: Reported on 1/31/2023), Disp: 21 tablet, Rfl: 0    PHYSICAL EXAM:  Vitals:    01/31/23 1340   BP: 120/70   BP Location: Right arm   Patient Position: Sitting   Pulse: 85   Resp: 16   Temp: (!) 97 2 °F (36 2 °C)   TempSrc: Temporal   SpO2: 97%   Weight: 120 kg (264 lb)   Height: 6' 1 5" (1 867 m)     Body mass index is 34 36 kg/m²  Physical Exam  Constitutional:       General: He is not in acute distress  Appearance: He is well-developed  He is obese  HENT:      Head: Normocephalic  Eyes:      General: No scleral icterus  Conjunctiva/sclera: Conjunctivae normal    Neck:      Vascular: No JVD  Cardiovascular:      Rate and Rhythm: Normal rate  Heart sounds: Normal heart sounds  Pulmonary:      Effort: Pulmonary effort is normal       Breath sounds: No wheezing     Abdominal:      Palpations: Abdomen is soft  Tenderness: There is no abdominal tenderness  Musculoskeletal:         General: Normal range of motion  Cervical back: Neck supple  Skin:     General: Skin is warm  Findings: No rash  Neurological:      Mental Status: He is alert and oriented to person, place, and time     Psychiatric:         Behavior: Behavior normal          LAB RESULTS:  Results for orders placed or performed in visit on 26/94/67   Basic metabolic panel   Result Value Ref Range    Sodium 140 135 - 147 mmol/L    Potassium 4 2 3 5 - 5 3 mmol/L    Chloride 103 96 - 108 mmol/L    CO2 29 21 - 32 mmol/L    ANION GAP 8 4 - 13 mmol/L    BUN 17 5 - 25 mg/dL    Creatinine 1 56 (H) 0 60 - 1 30 mg/dL    Glucose, Fasting 132 (H) 65 - 99 mg/dL    Calcium 8 8 8 3 - 10 1 mg/dL    eGFR 47 ml/min/1 73sq m   CBC and differential   Result Value Ref Range    WBC 4 35 4 31 - 10 16 Thousand/uL    RBC 4 96 3 88 - 5 62 Million/uL    Hemoglobin 14 5 12 0 - 17 0 g/dL    Hematocrit 45 0 36 5 - 49 3 %    MCV 91 82 - 98 fL    MCH 29 2 26 8 - 34 3 pg    MCHC 32 2 31 4 - 37 4 g/dL    RDW 13 9 11 6 - 15 1 %    MPV 10 1 8 9 - 12 7 fL    Platelets 874 752 - 621 Thousands/uL    nRBC 0 /100 WBCs    Neutrophils Relative 49 43 - 75 %    Immat GRANS % 1 0 - 2 %    Lymphocytes Relative 37 14 - 44 %    Monocytes Relative 8 4 - 12 %    Eosinophils Relative 4 0 - 6 %    Basophils Relative 1 0 - 1 %    Neutrophils Absolute 2 17 1 85 - 7 62 Thousands/µL    Immature Grans Absolute 0 02 0 00 - 0 20 Thousand/uL    Lymphocytes Absolute 1 60 0 60 - 4 47 Thousands/µL    Monocytes Absolute 0 35 0 17 - 1 22 Thousand/µL    Eosinophils Absolute 0 18 0 00 - 0 61 Thousand/µL    Basophils Absolute 0 03 0 00 - 0 10 Thousands/µL   Phosphorus   Result Value Ref Range    Phosphorus 3 1 2 3 - 4 1 mg/dL   Protein / creatinine ratio, urine   Result Value Ref Range    Creatinine, Ur 233 0 mg/dL    Protein Urine Random 74 mg/dL    Prot/Creat Ratio, Ur 0 32 (H) 0 00 - 0 10 PTH, intact   Result Value Ref Range    PTH 59 6 18 4 - 80 1 pg/mL   UA (URINE) with reflex to Scope   Result Value Ref Range    Color, UA Light Yellow     Clarity, UA Clear     Specific Gravity, UA 1 021 1 003 - 1 030    pH, UA 5 5 4 5, 5 0, 5 5, 6 0, 6 5, 7 0, 7 5, 8 0    Leukocytes, UA Negative Negative    Nitrite, UA Negative Negative    Protein, UA 50 (1+) (A) Negative mg/dl    Glucose, UA Negative Negative mg/dl    Ketones, UA Negative Negative mg/dl    Urobilinogen, UA <2 0 <2 0 mg/dl mg/dl    Bilirubin, UA Negative Negative    Occult Blood, UA Small (A) Negative   Vitamin D 25 hydroxy   Result Value Ref Range    Vit D, 25-Hydroxy 21 4 (L) 30 0 - 100 0 ng/mL   Urine Microscopic   Result Value Ref Range    RBC, UA 2-4 (A) None Seen, 1-2 /hpf    WBC, UA None Seen None Seen, 1-2 /hpf    Epithelial Cells None Seen None Seen, Occasional /hpf    Bacteria, UA None Seen None Seen, Occasional /hpf       ASSESSMENT and PLAN:      Stage 3a chronic kidney disease (HCC)  Lab Results   Component Value Date    EGFR 47 01/26/2023    EGFR 45 08/28/2022    EGFR 51 07/29/2022    CREATININE 1 56 (H) 01/26/2023    CREATININE 1 62 (H) 08/28/2022    CREATININE 1 48 (H) 07/29/2022   Renal function is quite stable at this point  Advise hydration and avoiding nephrotoxic medication    Chronic kidney disease-mineral and bone disorder  Lab Results   Component Value Date    EGFR 47 01/26/2023    EGFR 45 08/28/2022    EGFR 51 07/29/2022    CREATININE 1 56 (H) 01/26/2023    CREATININE 1 62 (H) 08/28/2022    CREATININE 1 48 (H) 07/29/2022   PTH and phosphorus along with vitamin D are within acceptable range and will continue to monitor    Essential hypertension  Blood pressure is very well controlled with present medication      Everything discussed at length with the patient    Patient does seems to have a hyperglycemia with a blood test   Advised to follow-up with primary doctor about that as he may need to be checked for hemoglobin A1c   I will see him back in 6-month and we will get blood test before that visit        Portions of the record may have been created with voice recognition software  Occasional wrong word or "sound a like" substitutions may have occurred due to the inherent limitations of voice recognition software  Read the chart carefully and recognize, using context, where substitutions have occurred  If you have any questions, please contact the dictating provider

## 2023-02-16 ENCOUNTER — OFFICE VISIT (OUTPATIENT)
Dept: INTERNAL MEDICINE CLINIC | Facility: CLINIC | Age: 58
End: 2023-02-16

## 2023-02-16 VITALS
SYSTOLIC BLOOD PRESSURE: 138 MMHG | HEIGHT: 74 IN | DIASTOLIC BLOOD PRESSURE: 80 MMHG | HEART RATE: 75 BPM | RESPIRATION RATE: 16 BRPM | OXYGEN SATURATION: 96 % | BODY MASS INDEX: 33.73 KG/M2 | WEIGHT: 262.8 LBS

## 2023-02-16 DIAGNOSIS — I10 ESSENTIAL HYPERTENSION: ICD-10-CM

## 2023-02-16 DIAGNOSIS — F32.9 MAJOR DEPRESSIVE DISORDER WITH SINGLE EPISODE, REMISSION STATUS UNSPECIFIED: ICD-10-CM

## 2023-02-16 DIAGNOSIS — R05.1 ACUTE COUGH: ICD-10-CM

## 2023-02-16 DIAGNOSIS — R31.21 ASYMPTOMATIC MICROSCOPIC HEMATURIA: Primary | ICD-10-CM

## 2023-02-16 DIAGNOSIS — R73.03 PREDIABETES: ICD-10-CM

## 2023-02-16 DIAGNOSIS — N52.9 ERECTILE DYSFUNCTION, UNSPECIFIED ERECTILE DYSFUNCTION TYPE: ICD-10-CM

## 2023-02-16 LAB
SL AMB  POCT GLUCOSE, UA: ABNORMAL
SL AMB LEUKOCYTE ESTERASE,UA: ABNORMAL
SL AMB POCT BILIRUBIN,UA: ABNORMAL
SL AMB POCT BLOOD,UA: ABNORMAL
SL AMB POCT CLARITY,UA: CLEAR
SL AMB POCT COLOR,UA: YELLOW
SL AMB POCT HEMOGLOBIN AIC: 6.2 (ref ?–6.5)
SL AMB POCT KETONES,UA: ABNORMAL
SL AMB POCT NITRITE,UA: ABNORMAL
SL AMB POCT PH,UA: 5.5
SL AMB POCT SPECIFIC GRAVITY,UA: 1.03
SL AMB POCT URINE PROTEIN: ABNORMAL
SL AMB POCT UROBILINOGEN: ABNORMAL

## 2023-02-16 RX ORDER — SILDENAFIL 100 MG/1
100 TABLET, FILM COATED ORAL DAILY PRN
Qty: 100 TABLET | Refills: 3 | Status: SHIPPED | OUTPATIENT
Start: 2023-02-16

## 2023-02-16 RX ORDER — MELOXICAM 15 MG/1
15 TABLET ORAL DAILY
COMMUNITY
Start: 2023-02-09

## 2023-02-16 RX ORDER — SILDENAFIL 100 MG/1
100 TABLET, FILM COATED ORAL DAILY PRN
Qty: 100 TABLET | Refills: 3 | Status: SHIPPED | OUTPATIENT
Start: 2023-02-16 | End: 2023-02-16 | Stop reason: SDUPTHER

## 2023-02-16 RX ORDER — SILDENAFIL 50 MG/1
50 TABLET, FILM COATED ORAL DAILY PRN
Qty: 10 TABLET | Refills: 0 | Status: SHIPPED | OUTPATIENT
Start: 2023-02-16 | End: 2023-02-16 | Stop reason: SDUPTHER

## 2023-02-16 NOTE — PROGRESS NOTES
Assessment/Plan:     Will f/u with urology for trace blood; has f/u scheduled for July A1c 6 2; will recheck in 3 months; discussed diet changes and exercise  Viagra for erectile dysfunction  Reports anxiety and depression; will see behavioral therapy; does not want medication; feels less motivated  Reports cough and wants to be tested for COVID  Quality Measures:     BMI Counseling: There is no height or weight on file to calculate BMI  The BMI is above normal  Nutrition recommendations include decreasing portion sizes and encouraging healthy choices of fruits and vegetables  Exercise recommendations include moderate physical activity 150 minutes/week  Rationale for BMI follow-up plan is due to patient being overweight or obese  Depression Screening and Follow-up Plan: Clincally patient does not have depression  No treatment is required  Return for Next scheduled follow up  No problem-specific Assessment & Plan notes found for this encounter  Diagnoses and all orders for this visit:    Asymptomatic microscopic hematuria  -     POCT urine dip    Essential hypertension  -     CBC and differential; Future  -     Comprehensive metabolic panel; Future  -     Lipid Panel with Direct LDL reflex; Future    Prediabetes  -     POCT hemoglobin A1c  -     Hemoglobin A1C; Future    Erectile dysfunction, unspecified erectile dysfunction type  -     Discontinue: sildenafil (VIAGRA) 50 MG tablet; Take 1 tablet (50 mg total) by mouth daily as needed for erectile dysfunction  -     Discontinue: sildenafil (VIAGRA) 100 mg tablet; Take 1 tablet (100 mg total) by mouth daily as needed for erectile dysfunction  -     sildenafil (VIAGRA) 100 mg tablet; Take 1 tablet (100 mg total) by mouth daily as needed for erectile dysfunction    Major depressive disorder with single episode, remission status unspecified  -     Ambulatory Referral to [facility];  Future    Other orders  -     meloxicam (MOBIC) 15 mg tablet; Take 15 mg by mouth daily          Subjective:      Patient ID: Candace Moss is a 59 y o  male  Here for follow up        ALLERGIES:  No Known Allergies    CURRENT MEDICATIONS:    Current Outpatient Medications:   •  amLODIPine-benazepril (LOTREL) 10-40 MG per capsule, Take 1 capsule by mouth daily, Disp: 90 capsule, Rfl: 3  •  cloNIDine (CATAPRES-TTS-3) 0 3 mg/24 hr, Place 1 patch (0 3 mg total) on the skin once a week, Disp: 12 patch, Rfl: 3  •  meloxicam (MOBIC) 15 mg tablet, Take 15 mg by mouth daily, Disp: , Rfl:   •  multivitamin (THERAGRAN) TABS, Take 1 tablet by mouth daily, Disp: , Rfl:   •  Omega-3 Fatty Acids (fish oil) 1,000 mg, Take 1,000 mg by mouth daily, Disp: , Rfl:   •  sildenafil (VIAGRA) 100 mg tablet, Take 1 tablet (100 mg total) by mouth daily as needed for erectile dysfunction, Disp: 100 tablet, Rfl: 3    ACTIVE PROBLEM LIST:  Patient Active Problem List   Diagnosis   • Stage 3a chronic kidney disease (HCC)   • Essential hypertension   • Chronic kidney disease-mineral and bone disorder   • History of colonic polyps       PAST MEDICAL HISTORY:  Past Medical History:   Diagnosis Date   • Chronic kidney disease    • Colon polyp    • Epididymitis 8/18/22   • Hypertension        PAST SURGICAL HISTORY:  Past Surgical History:   Procedure Laterality Date   • COLONOSCOPY     • SHOULDER SURGERY Left 10/2022       FAMILY HISTORY:  Family History   Problem Relation Age of Onset   • Hypertension Mother    • No Known Problems Father    • Cancer Sister    • Cancer Brother    • No Known Problems Brother    • No Known Problems Brother        SOCIAL HISTORY:  Social History     Socioeconomic History   • Marital status: /Civil Union     Spouse name: Not on file   • Number of children: Not on file   • Years of education: Not on file   • Highest education level: Not on file   Occupational History   • Not on file   Tobacco Use   • Smoking status: Never   • Smokeless tobacco: Never Vaping Use   • Vaping Use: Never used   Substance and Sexual Activity   • Alcohol use: Yes     Comment: Once a week   • Drug use: Never   • Sexual activity: Yes     Partners: Female   Other Topics Concern   • Not on file   Social History Narrative   • Not on file     Social Determinants of Health     Financial Resource Strain: Not on file   Food Insecurity: Not on file   Transportation Needs: Not on file   Physical Activity: Not on file   Stress: Not on file   Social Connections: Not on file   Intimate Partner Violence: Not on file   Housing Stability: Not on file       Review of Systems   Constitutional: Negative for fever  Musculoskeletal: Positive for arthralgias and back pain  All other systems reviewed and are negative  Objective:  Vitals:    02/16/23 1313   BP: 138/80   BP Location: Left arm   Patient Position: Sitting   Cuff Size: Adult   Pulse: 75   Resp: 16   SpO2: 96%   Weight: 119 kg (262 lb 12 8 oz)   Height: 6' 1 5" (1 867 m)     Body mass index is 34 2 kg/m²  Physical Exam  Vitals and nursing note reviewed  Constitutional:       Appearance: Normal appearance  HENT:      Head: Normocephalic and atraumatic  Cardiovascular:      Rate and Rhythm: Normal rate  Pulmonary:      Effort: Pulmonary effort is normal    Musculoskeletal:         General: Normal range of motion  Cervical back: Normal range of motion  Right lower leg: No edema  Left lower leg: No edema  Skin:     General: Skin is warm and dry  Neurological:      General: No focal deficit present  Mental Status: He is alert and oriented to person, place, and time  Mental status is at baseline     Psychiatric:         Mood and Affect: Mood normal          Behavior: Behavior normal            RESULTS:    Recent Results (from the past 1008 hour(s))   Basic metabolic panel    Collection Time: 01/26/23 10:06 AM   Result Value Ref Range    Sodium 140 135 - 147 mmol/L    Potassium 4 2 3 5 - 5 3 mmol/L Chloride 103 96 - 108 mmol/L    CO2 29 21 - 32 mmol/L    ANION GAP 8 4 - 13 mmol/L    BUN 17 5 - 25 mg/dL    Creatinine 1 56 (H) 0 60 - 1 30 mg/dL    Glucose, Fasting 132 (H) 65 - 99 mg/dL    Calcium 8 8 8 3 - 10 1 mg/dL    eGFR 47 ml/min/1 73sq m   CBC and differential    Collection Time: 01/26/23 10:06 AM   Result Value Ref Range    WBC 4 35 4 31 - 10 16 Thousand/uL    RBC 4 96 3 88 - 5 62 Million/uL    Hemoglobin 14 5 12 0 - 17 0 g/dL    Hematocrit 45 0 36 5 - 49 3 %    MCV 91 82 - 98 fL    MCH 29 2 26 8 - 34 3 pg    MCHC 32 2 31 4 - 37 4 g/dL    RDW 13 9 11 6 - 15 1 %    MPV 10 1 8 9 - 12 7 fL    Platelets 034 495 - 792 Thousands/uL    nRBC 0 /100 WBCs    Neutrophils Relative 49 43 - 75 %    Immat GRANS % 1 0 - 2 %    Lymphocytes Relative 37 14 - 44 %    Monocytes Relative 8 4 - 12 %    Eosinophils Relative 4 0 - 6 %    Basophils Relative 1 0 - 1 %    Neutrophils Absolute 2 17 1 85 - 7 62 Thousands/µL    Immature Grans Absolute 0 02 0 00 - 0 20 Thousand/uL    Lymphocytes Absolute 1 60 0 60 - 4 47 Thousands/µL    Monocytes Absolute 0 35 0 17 - 1 22 Thousand/µL    Eosinophils Absolute 0 18 0 00 - 0 61 Thousand/µL    Basophils Absolute 0 03 0 00 - 0 10 Thousands/µL   Phosphorus    Collection Time: 01/26/23 10:06 AM   Result Value Ref Range    Phosphorus 3 1 2 3 - 4 1 mg/dL   Protein / creatinine ratio, urine    Collection Time: 01/26/23 10:06 AM   Result Value Ref Range    Creatinine, Ur 233 0 mg/dL    Protein Urine Random 74 mg/dL    Prot/Creat Ratio, Ur 0 32 (H) 0 00 - 0 10   PTH, intact    Collection Time: 01/26/23 10:06 AM   Result Value Ref Range    PTH 59 6 18 4 - 80 1 pg/mL   UA (URINE) with reflex to Scope    Collection Time: 01/26/23 10:06 AM   Result Value Ref Range    Color, UA Light Yellow     Clarity, UA Clear     Specific Gravity, UA 1 021 1 003 - 1 030    pH, UA 5 5 4 5, 5 0, 5 5, 6 0, 6 5, 7 0, 7 5, 8 0    Leukocytes, UA Negative Negative    Nitrite, UA Negative Negative    Protein, UA 50 (1+) (A) Negative mg/dl    Glucose, UA Negative Negative mg/dl    Ketones, UA Negative Negative mg/dl    Urobilinogen, UA <2 0 <2 0 mg/dl mg/dl    Bilirubin, UA Negative Negative    Occult Blood, UA Small (A) Negative   Vitamin D 25 hydroxy    Collection Time: 01/26/23 10:06 AM   Result Value Ref Range    Vit D, 25-Hydroxy 21 4 (L) 30 0 - 100 0 ng/mL   Urine Microscopic    Collection Time: 01/26/23 10:06 AM   Result Value Ref Range    RBC, UA 2-4 (A) None Seen, 1-2 /hpf    WBC, UA None Seen None Seen, 1-2 /hpf    Epithelial Cells None Seen None Seen, Occasional /hpf    Bacteria, UA None Seen None Seen, Occasional /hpf   POCT urine dip    Collection Time: 02/16/23  1:28 PM   Result Value Ref Range    LEUKOCYTE ESTERASE,UA neg     NITRITE,UA neg     SL AMB POCT UROBILINOGEN 3 2 umol/L     POCT URINE PROTEIN 1 0 g/L      PH,UA 5 5     BLOOD,UA Ca 25 Casey/uL     SPECIFIC GRAVITY,UA 1 030     KETONES,UA neg     BILIRUBIN,UA neg     GLUCOSE, UA neg      COLOR,UA yellow     CLARITY,UA clear    POCT hemoglobin A1c    Collection Time: 02/16/23  1:40 PM   Result Value Ref Range    Hemoglobin A1C 6 2 6 5       This note was created with voice recognition software  Phonic, grammatical and spelling errors may be present within the note as a result

## 2023-02-17 LAB — SARS-COV-2 RNA RESP QL NAA+PROBE: NEGATIVE

## 2023-02-22 ENCOUNTER — TELEPHONE (OUTPATIENT)
Dept: PHYSICAL THERAPY | Facility: OTHER | Age: 58
End: 2023-02-22

## 2023-02-22 NOTE — TELEPHONE ENCOUNTER
Comp Spine Triage Call Backs    Checked patient's chart to make sure PT reached out to schedule his eval  Pt is scheduled for 2/23/23   CSP closed

## 2023-02-23 ENCOUNTER — EVALUATION (OUTPATIENT)
Dept: PHYSICAL THERAPY | Facility: CLINIC | Age: 58
End: 2023-02-23

## 2023-02-23 DIAGNOSIS — G89.29 ACUTE EXACERBATION OF CHRONIC LOW BACK PAIN: Primary | ICD-10-CM

## 2023-02-23 DIAGNOSIS — M54.50 ACUTE EXACERBATION OF CHRONIC LOW BACK PAIN: Primary | ICD-10-CM

## 2023-02-23 NOTE — PROGRESS NOTES
PT Evaluation     Today's date: 2023  Patient name: Girish Flores  : 1965  MRN: [id number]  Referring provider: Nannette Dorman MD  Dx:   Encounter Diagnosis     ICD-10-CM    1  Acute exacerbation of chronic low back pain  M54 50     G89 29                      Assessment  Assessment details: Patient was provided a home exercise program and demonstrated an understanding of exercises  Patient was advised to stop performing home exercise program if symptoms increase or new complaints developed  Verbal understanding demonstrated regarding home exercise program instructions  Patient would benefit from skilled physical therapy services for prescribed exercises, manual interventions, neuromuscular re-education, education, and modalities as deemed appropriate to assist patient in achieving their maximum level of function  Patient presents through the comprehensive spine program with c/o chronic lbp, intermittent right LE pain - ankle and right foot numbness intermittently  He underwent left RTC Surgery and is undergoing PT with [facility] and figured that since he was out on [facility] - he would address his lbp as well  Evaluation reveals:  Poor posturing with ff posturing in stand (able to self correct w/ cueing )   Patient presents with proximal le weakness bilaterally, loss of trunk mobility and movement, decreased hip AROM bilaterally with diminished flexibility bilateral hs/ quads/ hip flexors  He moves and ambulates with slow daljit  (-) neural tension on assessment  He appears motivated to reduce his lbp and improve his functional and recreational abilities  Impairments: abnormal gait, abnormal or restricted ROM, abnormal movement, activity intolerance, impaired physical strength, lacks appropriate home exercise program, pain with function and poor posture   Understanding of Dx/Px/POC: good  Goals  STG   1  Patient will demonstrate independence and competence with HEP 2 -4 weeks  2  Patient will report > 25% reduced pain 2-4 weeks    LTG   1  Patient will report improvements with both functional and recreational abilities  4-6 weeks  2  Patient will demonstrate improved motor function bilateral hips  4-6 weeks  3   Improved postural awareness and self correction  4-8 weeks  4  Restoration of functional trunk mobility  6-10 weeks      Plan  Plan details: Patient response to treatment will be monitored each session and progressed accordingly    Thank you for this referral    Patient would benefit from: skilled physical therapy  Planned modality interventions: thermotherapy: hydrocollator packs and cryotherapy  Planned therapy interventions: IADL retraining, joint mobilization, manual therapy, patient education, postural training, strengthening, stretching, therapeutic exercise, flexibility, home exercise program and neuromuscular re-education  Frequency: 2x week  Duration in weeks: 8  Treatment plan discussed with: patient        Subjective Evaluation    History of Present Illness  Mechanism of injury: Patient presents with years of LBP   He is getting PT in [place] s/p RTC Oct 2022 currently  He has applied for disability and this will determine if he goes btw  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  20 years ago, he slipped off the truck injuring his back  Chiropractor at that time said he had a couple herniated discs   He had to continue to work   Now - his pain is across LS region with right LE radicular pain - ankle with (+) foot numbness   His left le he reports that he has difficulty lifting his hip up  Better sitting with pain but the numbness starts     Denies b/b  Sleep (+) disruption   Pain  Current pain rating: 3  At worst pain rating: 10  Quality: sharp (nagging)  Relieving factors: change in position  Aggravating factors: walking and standing  Progression: no change    Social Support  Lives with: spouse    Patient Goals  Patient goals for therapy: decreased pain, independence with ADLs/IADLs and return to sport/leisure activities          Objective     Concurrent Complaints  Positive for disturbed sleep  Negative for night pain, bladder dysfunction, bowel dysfunction and saddle (S4) numbness    Postural Observations  Seated posture: fair  Standing posture: fair  Correction of posture: has no consistent effect    Additional Postural Observation Details  Patient standing in forward hip flexion posturing  He can correct w/ cueing but may lack endurance to sustain  Palpation   Left   No palpable tenderness to the erector spinae and lumbar paraspinals  Right   No palpable tenderness to the erector spinae and lumbar paraspinals  Tenderness     Lumbar Spine  Tenderness in the spinous process  Left Hip   No tenderness in the PSIS  Right Hip   No tenderness in the PSIS       Neurological Testing     Sensation     Lumbar   Left   Intact: light touch    Right   Intact: light touch    Active Range of Motion     Lumbar   Flexion:  with pain Restriction level: moderate  Extension:  with pain Restriction level: moderate    Joint Play     Hypomobile: L2, L3, L4, L5 and S1     Pain: L4 and L5   Mechanical Assessment    Cervical      Thoracic      Lumbar    Standing flexion: repeated movements   Pain location:no change  Pain level: increased  Lying flexion: repeated movements  Pain location: no change  Pain level: decreased  Standing extension: repeated movements  Pain intensity: worse  Pain level: increased  Lying extension: sustained positions  Pain intensity: worse  Pain level: increased    Strength/Myotome Testing     Left Hip   Planes of Motion   Flexion: 4-  Extension: 4-  Abduction: 4-  Adduction: 4+    Right Hip   Planes of Motion   Flexion: 4  Extension: 4  Abduction: 4  Adduction: 4+    Left Knee   Flexion: 5  Extension: 5    Right Knee   Flexion: 5  Extension: 5    Left Ankle/Foot   Dorsiflexion: 5    Right Ankle/Foot   Dorsiflexion: 5    Tests     Lumbar Left   Negative crossed SLR, femoral stretch, passive SLR and slump test      Right   Negative crossed SLR, femoral stretch, passive SLR and slump test      Additional Tests Details  ABIOLA/ FADIR - both tight     Bilateral hips with loss of AROM IR > ER  slr to 65 degrees bilaterally with hs tension/ tightness only elicited  General Comments:      Lumbar Comments  Gait - slowed daljit, lackadaisical movements             Precautions: chronic lbp w/ intermittent radicular pain right LE  - ankle, right foot numbness     PMH s/p Left RTC surgery October 2022 - still in PT with [facility]  Remains with limited left shoulder AROM     [employer]  Access Code: [email or web address]        Manuals 2/23            foto db                         Manual hss bilat             bilat hip AAROM                                                    Neuro Re-Ed                          HL TA marching             HL TB Hip abd             Clamshells TB             Bridging                                                     Postural cueing/ correction db                         Ther Ex             High bike             aktc 5s x 10            ltr 5s x 10            ppt 3s x 20            hss w/ strap  20s x 5            Fig 4 ir/er hip stretch  In sit or supine  15-20s x 3 bilat            Hip flexor stretch supine off table                                                                                                                                                            Ther Activity                                       Gait Training                                       Modalities

## 2023-03-07 ENCOUNTER — OFFICE VISIT (OUTPATIENT)
Dept: PHYSICAL THERAPY | Facility: CLINIC | Age: 58
End: 2023-03-07

## 2023-03-07 DIAGNOSIS — M54.50 ACUTE EXACERBATION OF CHRONIC LOW BACK PAIN: Primary | ICD-10-CM

## 2023-03-07 DIAGNOSIS — G89.29 ACUTE EXACERBATION OF CHRONIC LOW BACK PAIN: Primary | ICD-10-CM

## 2023-03-07 NOTE — PROGRESS NOTES
Daily Note     Today's date: 3/7/2023  Patient name: Julianne Sanchez  : 1965  MRN: [id number]  Referring provider: Tess Suárez MD  Dx:   Encounter Diagnosis     ICD-10-CM    1  Acute exacerbation of chronic low back pain  M54 50     G89 29           Start Time: 1147  Stop Time: 1230  Total time in clinic (min): 43 minutes    Subjective: Pt reports that when he got out of the car this morning he felt very stiff  Objective: See treatment diary below      Assessment: Pt arrives to session with forward trunk flexion while ambulating  Pt has significant stiffness in lumbar spine, improved with lumbar mobilizations and repeated prone press ups with clinician overpressure evidenced by more erect posture  Pt responded favorably to flexibility training with decreased tightness post session  Will continue to address joint and muscular stiffness in future sessions  Plan: Continue per plan of care  Progress treatment as tolerated  Precautions: chronic lbp w/ intermittent radicular pain right LE  - ankle, right foot numbness     PMH s/p Left RTC surgery 2022 - still in PT with [facility]  Remains with limited left shoulder AROM     [email or web address]  Access Code: [email or web address]        Manuals 2/23 3/7           foto db            Lumbar mobs   GIII SC           Manual hss bilat             bilat hip AAROM                                                    Neuro Re-Ed                          HL TA marching             HL TB Hip abd             Clamshells TB             Bridging                                                     Postural cueing/ correction db                         Ther Ex             High bike             aktc 5s x 10            ltr 5s x 10            ppt 3s x 20            hss w/ strap  20s x 5 30"x3           Fig 4 ir/er hip stretch  In sit or supine  15-20s x 3 bilat            Hip flexor stretch supine off table  30"x3           Prone press up  clin OP 4x10 Quad stretch  Prone c strap 30"x3                                                                                                                                Ther Activity                                       Gait Training                                       Modalities

## 2023-03-15 ENCOUNTER — SOCIAL WORK (OUTPATIENT)
Dept: BEHAVIORAL/MENTAL HEALTH CLINIC | Facility: CLINIC | Age: 58
End: 2023-03-15

## 2023-03-15 ENCOUNTER — OFFICE VISIT (OUTPATIENT)
Dept: PHYSICAL THERAPY | Facility: CLINIC | Age: 58
End: 2023-03-15

## 2023-03-15 DIAGNOSIS — G89.29 ACUTE EXACERBATION OF CHRONIC LOW BACK PAIN: Primary | ICD-10-CM

## 2023-03-15 DIAGNOSIS — M54.50 ACUTE EXACERBATION OF CHRONIC LOW BACK PAIN: Primary | ICD-10-CM

## 2023-03-15 DIAGNOSIS — F32.1 CURRENT MODERATE EPISODE OF MAJOR DEPRESSIVE DISORDER WITHOUT PRIOR EPISODE (HCC): Primary | ICD-10-CM
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
## 2023-03-15 NOTE — PROGRESS NOTES
Daily Note     Today's date: 3/15/2023  Patient name: Ilir Aguilar  : 1965  MRN: [id number]  Referring provider: Slade Thayer MD  Dx:   Encounter Diagnosis     ICD-10-CM    1  Acute exacerbation of chronic low back pain  M54 50     G89 29                      Subjective: Patient reports he is doing ok today  Notes that he did have therapy today for his shoulder as well  Objective: See treatment diary below      Assessment: Attempted PPU today but was unable to do so because of pain in the shoulder  Forward flexed posture remains for the patient  Some improvement noted after exercise today  Continue to progress as able  Plan: Continue per plan of care  Precautions: chronic lbp w/ intermittent radicular pain right LE  - ankle, right foot numbness     PMH s/p Left RTC surgery 2022 - still in PT with [facility]  Remains with limited left shoulder AROM     [email or web address]  Access Code: [email or web address]        Manuals 2/23 3/7 3/15          foto db            Lumbar mobs   GIII SC           Manual hss bilat             bilat hip AAROM                                                    Neuro Re-Ed                          HL TA marching             HL TB Hip abd   standing 20x          Clamshells TB             Bridging    20x                                                 Postural cueing/ correction db                         Ther Ex             High bike             aktc 5s x 10            ltr 5s x 10            ppt 3s x 20            hss w/ strap  20s x 5 30"x3 30"X4ea          Fig 4 ir/er hip stretch  In sit or supine  15-20s x 3 bilat            Hip flexor stretch supine off table  30"x3 30"X4          Prone press up  clin OP 4x10 unable          Quad stretch  Prone c strap 30"x3           Standing back extension   10x                                                                                                                  Ther Activity Gait Training                                       Modalities

## 2023-04-05 ENCOUNTER — SOCIAL WORK (OUTPATIENT)
Dept: BEHAVIORAL/MENTAL HEALTH CLINIC | Facility: CLINIC | Age: 58
End: 2023-04-05

## 2023-04-05 DIAGNOSIS — F32.1 CURRENT MODERATE EPISODE OF MAJOR DEPRESSIVE DISORDER WITHOUT PRIOR EPISODE (HCC): Primary | ICD-10-CM
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
## 2023-04-25 NOTE — PROGRESS NOTES
Pain Medicine Follow-Up Note    Assessment:  1  Chronic pain syndrome    2  Neck pain    3  Acute pain of right shoulder    4  Chronic right shoulder pain    5  Chronic right-sided low back pain with right-sided sciatica    6  Foraminal stenosis of lumbar region    7  Muscle spasm        Plan:  Orders Placed This Encounter   Procedures    XR spine cervical complete 4 or 5 vw non injury     Standing Status:   Future     Number of Occurrences:   1     Standing Expiration Date:   4/28/2027     Scheduling Instructions:      Bring along any outside films relating to this procedure   XR shoulder 2+ vw right     Standing Status:   Future     Number of Occurrences:   1     Standing Expiration Date:   4/28/2027     Scheduling Instructions:      Bring along any outside films relating to this procedure   FL spine and pain procedure     bonnie     Standing Status:   Future     Standing Expiration Date:   4/28/2027     Order Specific Question:   Reason for Exam:     Answer:   L4-L5 LESI     Order Specific Question:   Anticoagulant hold needed? Answer:   no    Ambulatory referral to Physical Therapy     Standing Status:   Future     Standing Expiration Date:   4/28/2024     Referral Priority:   Routine     Referral Type:   Physical Therapy     Referral Reason:   Specialty Services Required     Requested Specialty:   Physical Therapy     Number of Visits Requested:   1     Expiration Date:   4/28/2024       New Medications Ordered This Visit   Medications    methocarbamol (ROBAXIN) 500 mg tablet     Sig: Take 1 tablet (500 mg total) by mouth 4 (four) times a day as needed for muscle spasms     Dispense:  60 tablet     Refill:  0    methylPREDNISolone 4 MG tablet therapy pack     Sig: Use as directed on package     Dispense:  1 each     Refill:  0       My impressions and treatment recommendations were discussed in detail with the patient who verbalized understanding and had no further questions  Patient presents the office with worsening right low back pain that radiates down his right leg as well as new neck pain and bilateral shoulder pain  Patient was seen in the emergency department on 1/23/2023 with worsening low back pain and had a CT of his lumbar spine  I recommend the patient have a L4-L5 lumbar epidural steroid injection since the patient's pain follows an L5 dermatomal pattern which correlate with the patient's recent CT of his lumbar spine  We will order x-rays of the patient's cervical and right shoulder to check for degenerative changes and I recommend that the patient follow-up with physical therapy regarding his neck pain/shoulder pain  The patient may use a Medrol dose steroid pack to reduce his inflammation and use methocarbamol in order to tolerate physical therapy  If 6-8 weeks physical therapy does not significantly reduce the patient's neck pain I will order advanced imaging of the patient's cervical spine to evaluate possible cause of the patient's pain  The patient is in agreement with this plan    Complete risks and benefits including bleeding, infection, tissue reaction, nerve injury and allergic reaction were discussed  The approach was demonstrated using models and literature was provided  Verbal and written consent was obtained  Follow-up is planned in 4 weeks after injection time or sooner as warranted  Discharge instructions were provided  I personally saw and examined the patient and I agree with the above discussed plan of care  History of Present Illness:    Heber Denver is a 59 y o  male who presents to [facility] for interval re-evaluation of the above stated pain complaints  The patient has a past medical and chronic pain history as outlined in the assessment section  He was last seen on 7/08/2022      At today's visit patient states that their pain symptoms are worse with a pain score of 5/10 on the verbal numeric pain scale   The patient's pain is worse in at night  The patient's pain is intermittent in nature  And the quality of the patient's pain is described as sharp and numbness  The patient's pain is located in the posterior neck bilateral shoulders right low back and right lower leg  Patient has been using using over-the-counter Aleve and Tylenol as needed  Other than as stated above, the patient denies any interval changes in medications, medical condition, mental condition, symptoms, or allergies since the last office visit  Review of Systems:    Review of Systems   Respiratory: Positive for shortness of breath  Cardiovascular: Positive for chest pain  Gastrointestinal: Negative for constipation, diarrhea, nausea and vomiting  Musculoskeletal: Positive for arthralgias, gait problem and myalgias  Negative for joint swelling  Decreased ROM   Pain in extremity   Skin: Negative for rash  Neurological: Positive for dizziness  Negative for seizures and weakness  All other systems reviewed and are negative          Past Medical History:   Diagnosis Date    Chronic kidney disease     Colon polyp     Epididymitis 8/18/22    Hypertension        Past Surgical History:   Procedure Laterality Date    COLONOSCOPY      SHOULDER SURGERY Left 10/2022       Family History   Problem Relation Age of Onset    Hypertension Mother     No Known Problems Father     Cancer Sister     Cancer Brother     No Known Problems Brother     No Known Problems Brother        Social History     Occupational History    Not on file   Tobacco Use    Smoking status: Never    Smokeless tobacco: Never   Vaping Use    Vaping Use: Never used   Substance and Sexual Activity    Alcohol use: Yes     Comment: Once a week    Drug use: Never    Sexual activity: Yes     Partners: Female         Current Outpatient Medications:     meloxicam (MOBIC) 15 mg tablet, Take 15 mg by mouth daily, Disp: , Rfl:     methocarbamol "(ROBAXIN) 500 mg tablet, Take 1 tablet (500 mg total) by mouth 4 (four) times a day as needed for muscle spasms, Disp: 60 tablet, Rfl: 0    methylPREDNISolone 4 MG tablet therapy pack, Use as directed on package, Disp: 1 each, Rfl: 0    multivitamin (THERAGRAN) TABS, Take 1 tablet by mouth daily, Disp: , Rfl:     Omega-3 Fatty Acids (fish oil) 1,000 mg, Take 1,000 mg by mouth daily, Disp: , Rfl:     sildenafil (VIAGRA) 100 mg tablet, Take 1 tablet (100 mg total) by mouth daily as needed for erectile dysfunction, Disp: 100 tablet, Rfl: 3    amLODIPine-benazepril (LOTREL) 10-40 MG per capsule, Take 1 capsule by mouth daily, Disp: 90 capsule, Rfl: 3    cloNIDine (CATAPRES-TTS-3) 0 3 mg/24 hr, Place 1 patch (0 3 mg total) on the skin once a week, Disp: 12 patch, Rfl: 3    No Known Allergies    Physical Exam:    /86 (BP Location: Left arm, Patient Position: Sitting, Cuff Size: Standard)   Pulse 80   Ht 6' 1 5\" (1 867 m)   Wt 90 2 kg (198 lb 12 8 oz)   BMI 25 87 kg/m²     Constitutional:normal, well developed, well nourished, alert, in no distress and non-toxic and no overt pain behavior   and overweight  Eyes:anicteric  HEENT:grossly intact  Neck:Limited ROM ,TTP  Pulmonary:even and unlabored  Cardiovascular:No edema or pitting edema present  Skin:Normal without rashes or lesions and well hydrated  Psychiatric:Mood and affect appropriate  Neurologic:Cranial Nerves II-XII grossly intact  Musculoskeletal:antalgic    Cervical Spine Exam    Appearance:  Normal lordosis  Palpation/Tenderness:  left cervical paraspinal tenderness  right cervical paraspinal tenderness  left trapezium tenderness  right trapezium tenderness    Range of Motion:  Flexion:  No limitation  with pain  Extension:  Moderately limited  with pain  Lateral Flexion - Left:  Moderately limited  with pain  Lateral Flexion - Right:  Moderately limited  with pain  Rotation - Left:  Moderately limited  with pain  Rotation - Right:  Moderately " limited  with pain    Lumbar Spine Exam    Appearance:  Normal lordosis  Palpation/Tenderness:  left lumbar paraspinal tenderness  right lumbar paraspinal tenderness  Special Tests:  Slump test positive bilaterally      Imaging  FL spine and pain procedure    (Results Pending)         Orders Placed This Encounter   Procedures    XR spine cervical complete 4 or 5 vw non injury    XR shoulder 2+ vw right    FL spine and pain procedure    Ambulatory referral to Physical Therapy     This document was created using speech voice recognition software  Grammatical errors, random word insertions, pronoun errors, and incomplete sentences are an occasional consequence of this system due to software limitations, ambient noise, and hardware issues  Any formal questions or concerns about content, text, or information contained within the body of this dictation should be directly addressed to the provider for clarification

## 2023-04-26 ENCOUNTER — SOCIAL WORK (OUTPATIENT)
Dept: BEHAVIORAL/MENTAL HEALTH CLINIC | Facility: CLINIC | Age: 58
End: 2023-04-26

## 2023-04-26 DIAGNOSIS — F32.1 CURRENT MODERATE EPISODE OF MAJOR DEPRESSIVE DISORDER WITHOUT PRIOR EPISODE (HCC): Primary | ICD-10-CM

## 2023-04-26 NOTE — PSYCH
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  He put it on hold for now as he could not pass due to his shoulder  He does not have full mobility  The feel he may have a scar tissue build up in his shoulder  He is continuing with treatment and this is having a negative impact on his mood and he is mad at everyone  He feels like this is affecting his relationships at home  We processed his possibly having fun time with his friends  He is tired of being home as he has not been home in years as he worked 6 days a week  He is nitpicking on the family members  He states that his company let him go  They have not settled with him  He has never been injured before and he does not know how this works  He spoke of his mother being hard to deal with and she is in [place] and his godmother is a godsend as she lives down there  He has a home attendant for her, and she does not let them in and they have been through three agencies  He is not happy with being home and having his kids not doing their chores and he feels that he is hard on them as his wife always wants to be their friend  He is finding himself trying to not say anything mean and tries to make it nice  He feels like his wife talks down to him at times  He is appropriately dressed, wearing a baseball cap and glasses  His thought process is logical and speech is normal rate, volume and content  His wife is sending money to her daughter in [place] and his wife likes to macedo at the [employer]    A lot of little things have become big things "at this point  Effective communication skills were role modeled, role played and implemented  During this session, this clinician used the following therapeutic modalities: Cognitive Behavioral Therapy    Substance Abuse was not addressed during this session  If the client is diagnosed with a co-occurring substance use disorder, please indicate any changes in the frequency or amount of use: N/A  Stage of change for addressing substance use diagnoses: No substance use/Not applicable    ASSESSMENT:  Dawit Lopez presents with a Euthymic/ normal mood  his affect is Normal range and intensity, which is congruent, with his mood and the content of the session  The client has made progress on their goals  Dawit Lopez presents with a none risk of suicide, none risk of self-harm, and none risk of harm to others  For any risk assessment that surpasses a \"low\" rating, a safety plan must be developed  A safety plan was indicated: no  If yes, describe in detail N/A    PLAN: Between sessions, Dawit Lopez will identify good things about his marriage  At the next session, the therapist will use Cognitive Behavioral Therapy to address his irrational thinking  And his ineffective communication skills       Behavioral Health Treatment Plan and Discharge Planning: Dawit Lopez is aware of and agrees to continue to work on their treatment plan  They have identified and are working toward their discharge goals   yes    Visit start and stop times:    04/26/23  Start Time: 9450  Stop Time: 1547  Total Visit Time: 52 minutes                                                   "

## 2023-04-28 ENCOUNTER — OFFICE VISIT (OUTPATIENT)
Dept: PAIN MEDICINE | Facility: CLINIC | Age: 58
End: 2023-04-28

## 2023-04-28 ENCOUNTER — APPOINTMENT (OUTPATIENT)
Dept: RADIOLOGY | Facility: CLINIC | Age: 58
End: 2023-04-28

## 2023-04-28 VITALS
SYSTOLIC BLOOD PRESSURE: 148 MMHG | DIASTOLIC BLOOD PRESSURE: 86 MMHG | WEIGHT: 198.8 LBS | HEART RATE: 80 BPM | HEIGHT: 74 IN | BODY MASS INDEX: 25.51 KG/M2

## 2023-04-28 DIAGNOSIS — M54.2 NECK PAIN: ICD-10-CM

## 2023-04-28 DIAGNOSIS — M25.511 CHRONIC RIGHT SHOULDER PAIN: ICD-10-CM

## 2023-04-28 DIAGNOSIS — G89.29 CHRONIC RIGHT SHOULDER PAIN: ICD-10-CM

## 2023-04-28 DIAGNOSIS — M25.511 ACUTE PAIN OF RIGHT SHOULDER: ICD-10-CM

## 2023-04-28 DIAGNOSIS — M54.41 CHRONIC RIGHT-SIDED LOW BACK PAIN WITH RIGHT-SIDED SCIATICA: ICD-10-CM

## 2023-04-28 DIAGNOSIS — G89.29 CHRONIC RIGHT-SIDED LOW BACK PAIN WITH RIGHT-SIDED SCIATICA: ICD-10-CM

## 2023-04-28 DIAGNOSIS — G89.4 CHRONIC PAIN SYNDROME: Primary | ICD-10-CM

## 2023-04-28 DIAGNOSIS — M48.061 FORAMINAL STENOSIS OF LUMBAR REGION: ICD-10-CM

## 2023-04-28 DIAGNOSIS — M62.838 MUSCLE SPASM: ICD-10-CM

## 2023-04-28 RX ORDER — METHOCARBAMOL 500 MG/1
500 TABLET, FILM COATED ORAL 4 TIMES DAILY PRN
Qty: 60 TABLET | Refills: 0 | Status: SHIPPED | OUTPATIENT
Start: 2023-04-28

## 2023-04-28 RX ORDER — METHYLPREDNISOLONE 4 MG/1
TABLET ORAL
Qty: 1 EACH | Refills: 0 | Status: SHIPPED | OUTPATIENT
Start: 2023-04-28

## 2023-04-30 NOTE — PATIENT INSTRUCTIONS
Epidural Steroid Injection, Ambulatory Care   GENERAL INFORMATION:   What do I need to know about an epidural steroid injection? An epidural steroid injection (RIANNA) is a procedure to inject steroid medicine into the epidural space  The epidural space is between your spinal cord and vertebrae  Steroids reduce inflammation and fluid buildup in your spine that may be causing pain  You may be given pain medicine along with the steroids  How do I prepare for an RIANNA? Your healthcare provider will talk to you about how to prepare for your procedure  He will tell you what medicines to take or not take on the day of your procedure  You may need to stop taking blood thinners or other medicines several days before your procedure  You may need to adjust any diabetes medicine you take on the day of your procedure  Steroid medicine can increase your blood sugar level  What will happen during an RIANNA? You will be given medicine to numb the procedure area  You will be awake for the procedure, but you will not feel pain  You may also be given medicine to help you relax during the procedure  Contrast liquid will be used to help your healthcare provider see the area better  Tell the healthcare provider if you have ever had an allergic reaction to contrast liquid  Your healthcare provider may place the needle into your neck area, middle of your back, or tailbone area  He may inject the medicine next to the nerves that are causing your pain  He may instead inject the medicine into a larger area of the epidural space  This helps the medicine spread to more nerves  Your healthcare provider will use a fluoroscope to help guide the needle to the right place  A fluoroscope is a type of x-ray  After the procedure, a bandage will be placed over the injection site to prevent infection  What are the risks of an RIANNA? You may have temporary or permanent nerve damage or paralysis   You may have bleeding or develop a serious infection, such as meningitis (swelling of the brain coverings)  An abscess may also develop  You may need surgery to fix the abscess  You may have a seizure, anxiety, or trouble sleeping  If you are a man, you may have temporary erectile dysfunction (not able to have an erection)  CARE AGREEMENT:   You have the right to help plan your care  Learn about your health condition and how it may be treated  Discuss treatment options with your caregivers to decide what care you want to receive  You always have the right to refuse treatment  The above information is an  only  It is not intended as medical advice for individual conditions or treatments  Talk to your doctor, nurse or pharmacist before following any medical regimen to see if it is safe and effective for you  © 2014 [address] is for End User's use only and may not be sold, redistributed or otherwise used for commercial purposes  All illustrations and images included in CareNotes® are the copyrighted property of [employer]  or Layo Rasmussen

## 2023-05-03 ENCOUNTER — TELEPHONE (OUTPATIENT)
Dept: RADIOLOGY | Facility: CLINIC | Age: 58
End: 2023-05-03

## 2023-05-03 NOTE — TELEPHONE ENCOUNTER
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

## 2023-05-14 ENCOUNTER — HOSPITAL ENCOUNTER (EMERGENCY)
Facility: HOSPITAL | Age: 58
Discharge: HOME | End: 2023-05-14
Attending: EMERGENCY MEDICINE

## 2023-05-14 ENCOUNTER — APPOINTMENT (EMERGENCY)
Dept: CT IMAGING | Facility: HOSPITAL | Age: 58
End: 2023-05-14

## 2023-05-14 VITALS
DIASTOLIC BLOOD PRESSURE: 74 MMHG | OXYGEN SATURATION: 94 % | SYSTOLIC BLOOD PRESSURE: 124 MMHG | HEART RATE: 61 BPM | RESPIRATION RATE: 21 BRPM | TEMPERATURE: 98 F

## 2023-05-14 DIAGNOSIS — R07.9 CHEST PAIN, UNSPECIFIED: Primary | ICD-10-CM

## 2023-05-14 DIAGNOSIS — R10.9 ABDOMINAL PAIN: ICD-10-CM

## 2023-05-14 DIAGNOSIS — E04.1 THYROID NODULE: ICD-10-CM

## 2023-05-14 LAB
2HR DELTA HS TROPONIN: 0 NG/L
ALBUMIN SERPL BCP-MCNC: 4.5 G/DL (ref 3.5–5)
ALP SERPL-CCNC: 76 U/L (ref 34–104)
ALT SERPL W P-5'-P-CCNC: 35 U/L (ref 7–52)
ANION GAP SERPL CALCULATED.3IONS-SCNC: 6 MMOL/L (ref 4–13)
APTT PPP: 32 SECONDS (ref 23–37)
AST SERPL W P-5'-P-CCNC: 20 U/L (ref 13–39)
ATRIAL RATE: 77 BPM
BASOPHILS # BLD AUTO: 0.02 THOUSANDS/ÂΜL (ref 0–0.1)
BASOPHILS NFR BLD AUTO: 0 % (ref 0–1)
BILIRUB SERPL-MCNC: 0.7 MG/DL (ref 0.2–1)
BUN SERPL-MCNC: 32 MG/DL (ref 5–25)
CALCIUM SERPL-MCNC: 9.6 MG/DL (ref 8.4–10.2)
CARDIAC TROPONIN I PNL SERPL HS: 6 NG/L
CARDIAC TROPONIN I PNL SERPL HS: 6 NG/L
CHLORIDE SERPL-SCNC: 103 MMOL/L (ref 96–108)
CO2 SERPL-SCNC: 27 MMOL/L (ref 21–32)
CREAT SERPL-MCNC: 1.78 MG/DL (ref 0.6–1.3)
D DIMER PPP FEU-MCNC: 0.32 UG/ML FEU
EOSINOPHIL # BLD AUTO: 0.05 THOUSAND/ÂΜL (ref 0–0.61)
EOSINOPHIL NFR BLD AUTO: 1 % (ref 0–6)
ERYTHROCYTE [DISTWIDTH] IN BLOOD BY AUTOMATED COUNT: 14.2 % (ref 11.6–15.1)
GFR SERPL CREATININE-BSD FRML MDRD: 40 ML/MIN/1.73SQ M
GLUCOSE SERPL-MCNC: 106 MG/DL (ref 65–140)
HCT VFR BLD AUTO: 48.6 % (ref 36.5–49.3)
HGB BLD-MCNC: 15.6 G/DL (ref 12–17)
IMM GRANULOCYTES # BLD AUTO: 0.04 THOUSAND/UL (ref 0–0.2)
IMM GRANULOCYTES NFR BLD AUTO: 1 % (ref 0–2)
INR PPP: 0.94 (ref 0.84–1.19)
LIPASE SERPL-CCNC: 31 U/L (ref 11–82)
LYMPHOCYTES # BLD AUTO: 2.08 THOUSANDS/ÂΜL (ref 0.6–4.47)
LYMPHOCYTES NFR BLD AUTO: 30 % (ref 14–44)
MCH RBC QN AUTO: 29.8 PG (ref 26.8–34.3)
MCHC RBC AUTO-ENTMCNC: 32.1 G/DL (ref 31.4–37.4)
MCV RBC AUTO: 93 FL (ref 82–98)
MONOCYTES # BLD AUTO: 0.59 THOUSAND/ÂΜL (ref 0.17–1.22)
MONOCYTES NFR BLD AUTO: 8 % (ref 4–12)
NEUTROPHILS # BLD AUTO: 4.26 THOUSANDS/ÂΜL (ref 1.85–7.62)
NEUTS SEG NFR BLD AUTO: 60 % (ref 43–75)
NRBC BLD AUTO-RTO: 0 /100 WBCS
P AXIS: 75 DEGREES
PLATELET # BLD AUTO: 234 THOUSANDS/UL (ref 149–390)
PMV BLD AUTO: 10 FL (ref 8.9–12.7)
POTASSIUM SERPL-SCNC: 4.9 MMOL/L (ref 3.5–5.3)
PR INTERVAL: 158 MS
PROT SERPL-MCNC: 7.9 G/DL (ref 6.4–8.4)
PROTHROMBIN TIME: 12.4 SECONDS (ref 11.6–14.5)
QRS AXIS: 47 DEGREES
QRSD INTERVAL: 74 MS
QT INTERVAL: 338 MS
QTC INTERVAL: 382 MS
RBC # BLD AUTO: 5.23 MILLION/UL (ref 3.88–5.62)
SODIUM SERPL-SCNC: 136 MMOL/L (ref 135–147)
T WAVE AXIS: 68 DEGREES
VENTRICULAR RATE: 77 BPM
WBC # BLD AUTO: 7.04 THOUSAND/UL (ref 4.31–10.16)

## 2023-05-14 RX ORDER — DICYCLOMINE HCL 20 MG
20 TABLET ORAL 2 TIMES DAILY
Qty: 20 TABLET | Refills: 0 | Status: SHIPPED | OUTPATIENT
Start: 2023-05-14

## 2023-05-14 RX ORDER — METHOCARBAMOL 500 MG/1
500 TABLET, FILM COATED ORAL 2 TIMES DAILY PRN
Qty: 20 TABLET | Refills: 0 | Status: SHIPPED | OUTPATIENT
Start: 2023-05-14 | End: 2023-05-17

## 2023-05-14 RX ORDER — METHOCARBAMOL 500 MG/1
500 TABLET, FILM COATED ORAL 2 TIMES DAILY PRN
Qty: 20 TABLET | Refills: 0 | Status: SHIPPED | OUTPATIENT
Start: 2023-05-14 | End: 2023-05-14 | Stop reason: SDUPTHER

## 2023-05-14 RX ORDER — ONDANSETRON 4 MG/1
4 TABLET, ORALLY DISINTEGRATING ORAL EVERY 8 HOURS PRN
Qty: 20 TABLET | Refills: 0 | Status: SHIPPED | OUTPATIENT
Start: 2023-05-14 | End: 2023-05-17

## 2023-05-14 RX ORDER — DIAZEPAM 5 MG/1
5 TABLET ORAL ONCE
Status: COMPLETED | OUTPATIENT
Start: 2023-05-14 | End: 2023-05-14

## 2023-05-14 RX ORDER — MAGNESIUM HYDROXIDE/ALUMINUM HYDROXICE/SIMETHICONE 120; 1200; 1200 MG/30ML; MG/30ML; MG/30ML
30 SUSPENSION ORAL ONCE
Status: COMPLETED | OUTPATIENT
Start: 2023-05-14 | End: 2023-05-14

## 2023-05-14 RX ORDER — CYCLOBENZAPRINE HCL 10 MG
10 TABLET ORAL 2 TIMES DAILY PRN
Qty: 20 TABLET | Refills: 0 | Status: SHIPPED | OUTPATIENT
Start: 2023-05-14

## 2023-05-14 RX ADMIN — ALUMINUM HYDROXIDE, MAGNESIUM HYDROXIDE, AND DIMETHICONE 30 ML: 200; 20; 200 SUSPENSION ORAL at 16:16

## 2023-05-14 RX ADMIN — SODIUM CHLORIDE 1000 ML: 0.9 INJECTION, SOLUTION INTRAVENOUS at 16:17

## 2023-05-14 RX ADMIN — DIAZEPAM 5 MG: 5 TABLET ORAL at 19:50

## 2023-05-14 RX ADMIN — IOHEXOL 100 ML: 350 INJECTION, SOLUTION INTRAVENOUS at 16:48

## 2023-05-14 NOTE — ED PROVIDER NOTES
"History  Chief Complaint   Patient presents with   • Abdominal Pain     Pt reports \"moving pain that radiates into chest, abdomen, and bilateral flanks\" States hes been trying to burp, taking gas-x without relief  Reports constipation over the past 2 weeks, LBM this morning     HPI    Prior to Admission Medications   Prescriptions Last Dose Informant Patient Reported? Taking? Omega-3 Fatty Acids (fish oil) 1,000 mg   Yes No   Sig: Take 1,000 mg by mouth daily   amLODIPine-benazepril (LOTREL) 10-40 MG per capsule   No No   Sig: Take 1 capsule by mouth daily   cloNIDine (CATAPRES-TTS-3) 0 3 mg/24 hr   No No   Sig: Place 1 patch (0 3 mg total) on the skin once a week   meloxicam (MOBIC) 15 mg tablet   Yes No   Sig: Take 15 mg by mouth daily   methocarbamol (ROBAXIN) 500 mg tablet   No No   Sig: Take 1 tablet (500 mg total) by mouth 4 (four) times a day as needed for muscle spasms   methylPREDNISolone 4 MG tablet therapy pack   No No   Sig: Use as directed on package   multivitamin (THERAGRAN) TABS   Yes No   Sig: Take 1 tablet by mouth daily   sildenafil (VIAGRA) 100 mg tablet   No No   Sig: Take 1 tablet (100 mg total) by mouth daily as needed for erectile dysfunction      Facility-Administered Medications: None       Past Medical History:   Diagnosis Date   • Chronic kidney disease    • Colon polyp    • Epididymitis 8/18/22   • Hypertension        Past Surgical History:   Procedure Laterality Date   • COLONOSCOPY     • SHOULDER SURGERY Left 10/2022       Family History   Problem Relation Age of Onset   • Hypertension Mother    • No Known Problems Father    • Cancer Sister    • Cancer Brother    • No Known Problems Brother    • No Known Problems Brother      I have reviewed and agree with the history as documented      E-Cigarette/Vaping   • E-Cigarette Use Never User      E-Cigarette/Vaping Substances     Social History     Tobacco Use   • Smoking status: Never   • Smokeless tobacco: Never   Vaping Use   • Vaping " Use: Never used   Substance Use Topics   • Alcohol use: Yes     Comment: Once a week   • Drug use: Never       Review of Systems    Physical Exam  Physical Exam    Vital Signs  ED Triage Vitals [05/14/23 1351]   Temperature Pulse Respirations Blood Pressure SpO2   98 °F (36 7 °C) 75 17 128/76 97 %      Temp src Heart Rate Source Patient Position - Orthostatic VS BP Location FiO2 (%)   -- -- -- -- --      Pain Score       --           Vitals:    05/14/23 1700 05/14/23 1730 05/14/23 1800 05/14/23 1830   BP: 129/71 130/74 130/79 121/79   Pulse: 64 64 63 68         Visual Acuity      ED Medications  Medications   aluminum-magnesium hydroxide-simethicone (MYLANTA) oral suspension 30 mL (30 mL Oral Given 5/14/23 1616)   sodium chloride 0 9 % bolus 1,000 mL (1,000 mL Intravenous New Bag 5/14/23 1617)   iohexol (OMNIPAQUE) 350 MG/ML injection (MULTI-DOSE) 100 mL (100 mL Intravenous Given 5/14/23 1648)       Diagnostic Studies  Results Reviewed     Procedure Component Value Units Date/Time    HS Troponin I 2hr [[id number]]  (Normal) Collected: 05/14/23 1747    Lab Status: Final result Specimen: Blood from Arm, Right Updated: 05/14/23 1820     hs TnI 2hr 6 ng/L      Delta 2hr hsTnI 0 ng/L     HS Troponin I 4hr [[id number]]     Lab Status: No result Specimen: Blood     HS Troponin 0hr (reflex protocol) [[id number]]  (Normal) Collected: 05/14/23 1529    Lab Status: Final result Specimen: Blood from Arm, Right Updated: 05/14/23 1614     hs TnI 0hr 6 ng/L     Lipase [[id number]]  (Normal) Collected: 05/14/23 1529    Lab Status: Final result Specimen: Blood from Arm, Right Updated: 05/14/23 1608     Lipase 31 u/L     Comprehensive metabolic panel [[id number]]  (Abnormal) Collected: 05/14/23 1529    Lab Status: Final result Specimen: Blood from Arm, Right Updated: 05/14/23 1608     Sodium 136 mmol/L      Potassium 4 9 mmol/L      Chloride 103 mmol/L      CO2 27 mmol/L      ANION GAP 6 mmol/L      BUN 32 mg/dL      Creatinine 1 78 mg/dL Glucose 106 mg/dL      Calcium 9 6 mg/dL      AST 20 U/L      ALT 35 U/L      Alkaline Phosphatase 76 U/L      Total Protein 7 9 g/dL      Albumin 4 5 g/dL      Total Bilirubin 0 70 mg/dL      eGFR 40 ml/min/1 73sq m     Narrative:      [place] guidelines for Chronic Kidney Disease (CKD):   •  Stage 1 with normal or high GFR (GFR > 90 mL/min/1 73 square meters)  •  Stage 2 Mild CKD (GFR = 60-89 mL/min/1 73 square meters)  •  Stage 3A Moderate CKD (GFR = 45-59 mL/min/1 73 square meters)  •  Stage 3B Moderate CKD (GFR = 30-44 mL/min/1 73 square meters)  •  Stage 4 Severe CKD (GFR = 15-29 mL/min/1 73 square meters)  •  Stage 5 End Stage CKD (GFR <15 mL/min/1 73 square meters)  Note: GFR calculation is accurate only with a steady state creatinine    D-Dimer [[id number]]  (Normal) Collected: 05/14/23 1529    Lab Status: Final result Specimen: Blood from Arm, Right Updated: 05/14/23 1608     D-Dimer, Quant 0 32 ug/ml FEU     Narrative: In the evaluation for possible pulmonary embolism, in the appropriate (Well's Score of 4 or less) patient, the age adjusted d-dimer cutoff for this patient can be calculated as:    Age x 0 01 (in ug/mL) for Age-adjusted D-dimer exclusion threshold for a patient over 50 years      Protime-INR [[id number]]  (Normal) Collected: 05/14/23 1529    Lab Status: Final result Specimen: Blood from Arm, Right Updated: 05/14/23 1605     Protime 12 4 seconds      INR 0 94    APTT [[id number]]  (Normal) Collected: 05/14/23 1529    Lab Status: Final result Specimen: Blood from Arm, Right Updated: 05/14/23 1605     PTT 32 seconds     CBC and differential [[id number]] Collected: 05/14/23 1529    Lab Status: Final result Specimen: Blood from Arm, Right Updated: 05/14/23 1546     WBC 7 04 Thousand/uL      RBC 5 23 Million/uL      Hemoglobin 15 6 g/dL      Hematocrit 48 6 %      MCV 93 fL      MCH 29 8 pg      MCHC 32 1 g/dL      RDW 14 2 %      MPV 10 0 fL      Platelets 107 Thousands/uL      nRBC 0 /100 WBCs      Neutrophils Relative 60 %      Immat GRANS % 1 %      Lymphocytes Relative 30 %      Monocytes Relative 8 %      Eosinophils Relative 1 %      Basophils Relative 0 %      Neutrophils Absolute 4 26 Thousands/µL      Immature Grans Absolute 0 04 Thousand/uL      Lymphocytes Absolute 2 08 Thousands/µL      Monocytes Absolute 0 59 Thousand/µL      Eosinophils Absolute 0 05 Thousand/µL      Basophils Absolute 0 02 Thousands/µL                  CT chest abdomen pelvis w contrast   Final Result by Aziza Alex MD (05/14 1856)      1  No acute abnormality in the chest, abdomen, or pelvis  2   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  Follow-up ultrasound is recommended  The study was marked in [facility] for immediate notification  Workstation performed: [id number]                    Procedures  ECG 12 Lead Documentation Only    Date/Time: 5/14/2023 1:53 PM  Performed by: Bard Cr PA-C  Authorized by: Bard Cr PA-C     Indications / Diagnosis:  Chest pain  ECG reviewed by me, the ED Provider: yes    Patient location:  ED  Rate:     ECG rate:  77    ECG rate assessment: normal    Rhythm:     Rhythm: sinus rhythm    Ectopy:     Ectopy: none    QRS:     QRS axis:  Normal    QRS intervals:  Normal  Conduction:     Conduction: normal    ST segments:     ST segments:  Normal  T waves:     T waves: normal                       ED Course                               SBIRT 22yo+    Flowsheet Row Most Recent Value   Initial Alcohol Screen: US AUDIT-C     1  How often do you have a drink containing alcohol? 0 Filed at: 05/14/2023 1352   2  How many drinks containing alcohol do you have on a typical day you are drinking? 0 Filed at: 05/14/2023 1352   3a  Male UNDER 65: How often do you have five or more drinks on one occasion? 0 Filed at: 05/14/2023 1352   3b  FEMALE Any Age, or MALE 65+:  How often do you have 4 or more drinks on one occassion? 0 Filed at: 05/14/2023 1352   Audit-C Score 0 Filed at: 05/14/2023 1352   SHINE: How many times in the past year have you    Used an illegal drug or used a prescription medication for non-medical reasons? Never Filed at: 05/14/2023 1352                    MDM    Disposition  Final diagnoses:   Chest pain, unspecified   Abdominal pain   Thyroid nodule     Time reflects when diagnosis was documented in both MDM as applicable and the Disposition within this note     Time User Action Codes Description Comment    5/14/2023  7:20 PM Tristan Salt Add [R07 9] Chest pain, unspecified     5/14/2023  7:20 PM Tristan Salt Add [R10 9] Abdominal pain     5/14/2023  7:21 PM Tristan Salt Add [E04 1] Thyroid nodule       ED Disposition     ED Disposition   Discharge    Condition   Stable    Date/Time   Sun May 14, 2023  7:16 PM    Comment   Jessenia Brandt Welcome discharge to home/self care  Follow-up Information     Follow up With Specialties Details Why Contact Info Additional Information    John Cadet MD Internal Medicine   [address] [place] [address]  [phone]       [address] [facility] Emergency Department Emergency Medicine   [address] [facility] [id number] [facility] Emergency Department, [address] [place], [place], [facility]          Patient's Medications   Discharge Prescriptions    No medications on file       No discharge procedures on file      PDMP Review     None          ED Provider  Electronically Signed by RDW 14 2 %      MPV 10 0 fL      Platelets 013 Thousands/uL      nRBC 0 /100 WBCs      Neutrophils Relative 60 %      Immat GRANS % 1 %      Lymphocytes Relative 30 %      Monocytes Relative 8 %      Eosinophils Relative 1 %      Basophils Relative 0 %      Neutrophils Absolute 4 26 Thousands/µL      Immature Grans Absolute 0 04 Thousand/uL      Lymphocytes Absolute 2 08 Thousands/µL      Monocytes Absolute 0 59 Thousand/µL      Eosinophils Absolute 0 05 Thousand/µL      Basophils Absolute 0 02 Thousands/µL                  CT chest abdomen pelvis w contrast   Final Result by Bridgette Cantrell MD (05/14 1856)      1  No acute abnormality in the chest, abdomen, or pelvis  2   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  Follow-up ultrasound is recommended  The study was marked in [facility] for immediate notification  Workstation performed: [id number]                    Procedures  ECG 12 Lead Documentation Only    Date/Time: 5/14/2023 1:53 PM  Performed by: Vasu Laureano PA-C  Authorized by: Vasu Laureano PA-C     Indications / Diagnosis:  Chest pain  ECG reviewed by me, the ED Provider: yes    Patient location:  ED  Rate:     ECG rate:  77    ECG rate assessment: normal    Rhythm:     Rhythm: sinus rhythm    Ectopy:     Ectopy: none    QRS:     QRS axis:  Normal    QRS intervals:  Normal  Conduction:     Conduction: normal    ST segments:     ST segments:  Normal  T waves:     T waves: normal                       ED Course                               SBIRT 20yo+    Flowsheet Row Most Recent Value   Initial Alcohol Screen: US AUDIT-C     1  How often do you have a drink containing alcohol? 0 Filed at: 05/14/2023 1352   2  How many drinks containing alcohol do you have on a typical day you are drinking? 0 Filed at: 05/14/2023 1352   3a  Male UNDER 65: How often do you have five or more drinks on one occasion? 0 Filed at: 05/14/2023 1352   3b   FEMALE Any Age, or MALE 65+: How often do you have 4 or more drinks on one occassion? 0 Filed at: 05/14/2023 1352   Audit-C Score 0 Filed at: 05/14/2023 1352   SHINE: How many times in the past year have you    Used an illegal drug or used a prescription medication for non-medical reasons? Never Filed at: 05/14/2023 2662                    Medical Decision Making  This is a well-appearing 76-year-old male presenting for evaluation with several days of nonspecific abdominal pain that radiates towards the chest and back  He reports symptoms of dry cough, indigestion, and constipation  Aside from history of hypertension, no other cardiac risk factors  Differential diagnosis is broad in this patient, including but not limited to: ACS/anginal equivalent, congestive heart failure, GERD, gastritis, enteritis, pancreatitis, enteritis, acute cholecystitis, choledocholithiasis, hepatitis, diverticulitis, constipation, pharyngitis, allergies, viral uri, pneumonia, bronchitis; doubt AAA/aortic dissection or vascular pathology    Initial ED plan: ECG, labs, imaging    Final ED Assessment: Vital signs reviewed on ED presentation, examination as above  All labs and imaging independently reviewed with imaging interpreted by the Radiologist   ECG without ischemic changes, troponin within normal limits  Delta at 2 hours is 0  Heart score 2 given for age and risk factors  CT chest/abdomen/pelvis reports no acute abnormality  There is an incidental finding of a very large left-sided thyroid nodule or nodules with substernal extension recommending a follow-up ultrasound  All test results reviewed with the patient and spouse at bedside and a physical copy of CT report was given  Discussed uncertain etiology regarding patient's present symptoms recommending continued supportive care  Will discharge with prescriptions for Zofran and Bentyl, as well as Robaxin to take as needed for alleviation of muscle spasms in his back    Muscle relaxant precautions reviewed  Recommended PCP follow-up for reassessment as well as obtaining outpatient thyroid ultrasound  Parameters for ED return discussed at length  The patient's stable vital signs, unremarkable examination, and otherwise negative work-up today are reassuring  He was discharged in stable condition and ambulated independently from the emergency department today without issue  Abdominal pain: acute illness or injury  Chest pain, unspecified: acute illness or injury  Amount and/or Complexity of Data Reviewed  Labs: ordered  Radiology: ordered  ECG/medicine tests: ordered  Decision-making details documented in ED Course  Risk  OTC drugs  Prescription drug management  Disposition  Final diagnoses:   Chest pain, unspecified   Abdominal pain   Thyroid nodule     Time reflects when diagnosis was documented in both MDM as applicable and the Disposition within this note     Time User Action Codes Description Comment    5/14/2023  7:20 PM Yoana Riley Add [R07 9] Chest pain, unspecified     5/14/2023  7:20 PM Yoana Riley Add [R10 9] Abdominal pain     5/14/2023  7:21 PM Yoana Riley Add [E04 1] Thyroid nodule       ED Disposition     ED Disposition   Discharge    Condition   Stable    Date/Time   Sun May 14, 2023  7:16 PM    Comment   Priscilla Carrasco Welcome discharge to home/self care                 Follow-up Information     Follow up With Specialties Details Why Contact Info Additional [address] [facility] MD Radha Internal Medicine   [address]  [facility]  [facility] [place] Emergency Department Emergency Medicine   [address] [facility] [id number] [facility] Emergency Department, [address], [place], [address]          Discharge Medication List as of 5/14/2023  7:32 PM      START taking these medications    Details   dicyclomine (BENTYL) 20 mg tablet Take 1 tablet (20 mg total) by mouth 2 (two) times a day, Starting Sun 5/14/2023, Normal      !! methocarbamol (ROBAXIN) 500 mg tablet Take 1 tablet (500 mg total) by mouth 2 (two) times a day as needed for muscle spasms, Starting Sun 5/14/2023, Normal      ondansetron (Zofran ODT) 4 mg disintegrating tablet Take 1 tablet (4 mg total) by mouth every 8 (eight) hours as needed for nausea or vomiting, Starting Sun 5/14/2023, Normal       !! - Potential duplicate medications found  Please discuss with provider  CONTINUE these medications which have NOT CHANGED    Details   amLODIPine-benazepril (LOTREL) 10-40 MG per capsule Take 1 capsule by mouth daily, Starting Mon 11/21/2022, Until Sun 2/19/2023, Normal      cloNIDine (CATAPRES-TTS-3) 0 3 mg/24 hr Place 1 patch (0 3 mg total) on the skin once a week, Starting Mon 11/21/2022, Until Sun 2/19/2023, Normal      !! methocarbamol (ROBAXIN) 500 mg tablet Take 1 tablet (500 mg total) by mouth 4 (four) times a day as needed for muscle spasms, Starting Fri 4/28/2023, Normal      multivitamin (THERAGRAN) TABS Take 1 tablet by mouth daily, Historical Med      Omega-3 Fatty Acids (fish oil) 1,000 mg Take 1,000 mg by mouth daily, Historical Med      sildenafil (VIAGRA) 100 mg tablet Take 1 tablet (100 mg total) by mouth daily as needed for erectile dysfunction, Starting Thu 2/16/2023, Normal      meloxicam (MOBIC) 15 mg tablet Take 15 mg by mouth daily, Starting u 2/9/2023, Historical Med      methylPREDNISolone 4 MG tablet therapy pack Use as directed on package, Normal       !! - Potential duplicate medications found  Please discuss with provider  No discharge procedures on file      PDMP Review     None          ED Provider  Electronically Signed by           Erickson Dunn PA-C  05/22/23 3290

## 2023-05-14 NOTE — DISCHARGE INSTRUCTIONS
Recommend close PCP regarding large left thyroid nodule seen on CT today, and ordering of ultrasound for further evaluation  Recommend Zofran and Bentyl for alleviation of abdominal pain and outpatient gastroenterology follow-up for further evaluation if symptoms continue  Return immediately to the ED with any new or worsening symptoms as discussed

## 2023-05-17 ENCOUNTER — OFFICE VISIT (OUTPATIENT)
Dept: GASTROENTEROLOGY | Facility: CLINIC | Age: 58
End: 2023-05-17

## 2023-05-17 VITALS
DIASTOLIC BLOOD PRESSURE: 72 MMHG | HEART RATE: 74 BPM | OXYGEN SATURATION: 96 % | SYSTOLIC BLOOD PRESSURE: 124 MMHG | HEIGHT: 73 IN | BODY MASS INDEX: 34.85 KG/M2 | WEIGHT: 263 LBS

## 2023-05-17 DIAGNOSIS — K59.00 CONSTIPATION, UNSPECIFIED CONSTIPATION TYPE: Primary | ICD-10-CM

## 2023-05-17 DIAGNOSIS — R14.0 BLOATING: ICD-10-CM

## 2023-05-17 DIAGNOSIS — R10.10 PAIN OF UPPER ABDOMEN: ICD-10-CM

## 2023-05-17 NOTE — PROGRESS NOTES
[facility] - Outpatient Follow-up Note  Alexys Owen 59 y o  male MRN: [id number]  Encounter: [id number]          ASSESSMENT AND PLAN:      1  Pain of upper abdomen  2  Constipation, unspecified constipation type  3  Bloating  New symptoms in the past 2 weeks  Having almost daily very hard/small stools  Associated with bloat and upper abdominal pain  CT AP in the ER was negative for acute abdominal pathology  Thyroid nodules noted - he has f/u with PCP next week    Bowel prep to clean out and then Miralax 1 capful daily  Ca was normal in the ER  No new meds  Colonoscopy 4/2022 with polyps    ______________________________________________________________________    SUBJECTIVE: 77-year-old male with a past medical history significant for hypertension and chronic kidney disease who presents for evaluation of constipation and abdominal pain  He reports that the symptoms started acutely about 2 weeks ago  Because of the pain he went to the emergency room for evaluation  He had a work-up with labs and a CT scan all of which were essentially unremarkable except for the finding of new thyroid nodules  He has a follow-up with his family doctor next week  It is unclear when the last time his thyroid levels were checked  He denies any history of any significant GI issues  He had a routine colonoscopy in April 2022 with several adenomatous polyps removed  He was also noted to have diverticulosis  He denies any rectal bleeding  He denies any nausea or vomiting  He is eating normally  He has not taken anything specifically for the constipation  Prior to symptom onset he denies any new medications, illnesses or dietary changes  REVIEW OF SYSTEMS IS OTHERWISE NEGATIVE        Historical Information   Past Medical History:   Diagnosis Date   • Chronic kidney disease    • Colon polyp    • Epididymitis 8/18/22   • Hypertension      Past Surgical History:   Procedure Laterality Date   • "COLONOSCOPY     • SHOULDER SURGERY Left 10/2022     Social History   Social History     Substance and Sexual Activity   Alcohol Use Yes    Comment: Once a week     Social History     Substance and Sexual Activity   Drug Use Never     Social History     Tobacco Use   Smoking Status Never   Smokeless Tobacco Never     Family History   Problem Relation Age of Onset   • Hypertension Mother    • No Known Problems Father    • Cancer Sister    • Cancer Brother    • No Known Problems Brother    • No Known Problems Brother        Meds/Allergies       Current Outpatient Medications:   •  amLODIPine-benazepril (LOTREL) 10-40 MG per capsule  •  cloNIDine (CATAPRES-TTS-3) 0 3 mg/24 hr  •  cyclobenzaprine (FLEXERIL) 10 mg tablet  •  dicyclomine (BENTYL) 20 mg tablet  •  methocarbamol (ROBAXIN) 500 mg tablet  •  multivitamin (THERAGRAN) TABS  •  Omega-3 Fatty Acids (fish oil) 1,000 mg  •  sildenafil (VIAGRA) 100 mg tablet    No Known Allergies        Objective     Blood pressure 124/72, pulse 74, height 6' 1\" (1 854 m), weight 119 kg (263 lb), SpO2 96 %  Body mass index is 34 7 kg/m²  PHYSICAL EXAM:      General Appearance:   Alert, cooperative, no distress   HEENT:   Normocephalic, atraumatic, anicteric      Neck:  Supple, symmetrical, trachea midline   Lungs:   Clear to auscultation bilaterally; no rales, rhonchi or wheezing; respirations unlabored    Heart[de-identified]   Regular rate and rhythm; no murmur, rub, or gallop  Abdomen:   Soft, non-tender, non-distended; normal bowel sounds; no masses, no organomegaly    Genitalia:   Deferred    Rectal:   Deferred    Extremities:  No cyanosis, clubbing or edema    Pulses:  2+ and symmetric    Skin:  No jaundice, rashes, or lesions    Lymph nodes:  No palpable cervical lymphadenopathy        Lab Results:   No visits with results within 1 Day(s) from this visit     Latest known visit with results is:   Admission on 05/14/2023, Discharged on 05/14/2023   Component Date Value   • Ventricular " Rate 05/14/2023 77    • Atrial Rate 05/14/2023 77    • OK Interval 05/14/2023 158    • QRSD Interval 05/14/2023 74    • QT Interval 05/14/2023 338    • QTC Interval 05/14/2023 382    • P Axis 05/14/2023 75    • QRS Axis 05/14/2023 47    • T Wave Axis 05/14/2023 68    • WBC 05/14/2023 7 04    • RBC 05/14/2023 5 23    • Hemoglobin 05/14/2023 15 6    • Hematocrit 05/14/2023 48 6    • MCV 05/14/2023 93    • MCH 05/14/2023 29 8    • MCHC 05/14/2023 32 1    • RDW 05/14/2023 14 2    • MPV 05/14/2023 10 0    • Platelets 82/03/1332 234    • nRBC 05/14/2023 0    • Neutrophils Relative 05/14/2023 60    • Immat GRANS % 05/14/2023 1    • Lymphocytes Relative 05/14/2023 30    • Monocytes Relative 05/14/2023 8    • Eosinophils Relative 05/14/2023 1    • Basophils Relative 05/14/2023 0    • Neutrophils Absolute 05/14/2023 4 26    • Immature Grans Absolute 05/14/2023 0 04    • Lymphocytes Absolute 05/14/2023 2 08    • Monocytes Absolute 05/14/2023 0 59    • Eosinophils Absolute 05/14/2023 0 05    • Basophils Absolute 05/14/2023 0 02    • Protime 05/14/2023 12 4    • INR 05/14/2023 0 94    • PTT 05/14/2023 32    • Sodium 05/14/2023 136    • Potassium 05/14/2023 4 9    • Chloride 05/14/2023 103    • CO2 05/14/2023 27    • ANION GAP 05/14/2023 6    • BUN 05/14/2023 32 (H)    • Creatinine 05/14/2023 1 78 (H)    • Glucose 05/14/2023 106    • Calcium 05/14/2023 9 6    • AST 05/14/2023 20    • ALT 05/14/2023 35    • Alkaline Phosphatase 05/14/2023 76    • Total Protein 05/14/2023 7 9    • Albumin 05/14/2023 4 5    • Total Bilirubin 05/14/2023 0 70    • eGFR 05/14/2023 40    • Lipase 05/14/2023 31    • hs TnI 0hr 05/14/2023 6    • D-Dimer, Quant 05/14/2023 0 32    • hs TnI 2hr 05/14/2023 6    • Delta 2hr hsTnI 05/14/2023 0          Radiology Results:   XR spine cervical complete 4 or 5 vw non injury    Result Date: 5/3/2023  Narrative: CERVICAL SPINE INDICATION:   M54 2: Cervicalgia   COMPARISON:  None VIEWS:  XR SPINE CERVICAL COMPLETE 4 OR 5 VW NON INJURY Images: 5 FINDINGS: No fracture  Grade 1 retrolisthesis of C3 on C4 with intervertebral disc base narrowing  Anterior osteophytes present at C4-C6  The neuroforamina are patent  The prevertebral soft tissues are within normal limits  The lung apices are clear  Impression: Multilevel degenerative changes of cervical spine  Workstation performed: [id number]     XR shoulder 2+ vw right    Result Date: 5/2/2023  Narrative: RIGHT SHOULDER INDICATION:   M25 511: Pain in right shoulder G89 29: Other chronic pain  COMPARISON:  None VIEWS:  XR SHOULDER 2+ VW RIGHT FINDINGS: There is no acute fracture or dislocation  Mild glenohumeral and moderate acromioclavicular osteoarthritis  No lytic or blastic osseous lesion  Soft tissues are unremarkable  Impression: No acute osseous abnormality  Workstation performed: [id number]     CT chest abdomen pelvis w contrast    Result Date: 5/14/2023  Narrative: CT CHEST, ABDOMEN AND PELVIS WITH IV CONTRAST INDICATION:   chest pain and abdominal pain  COMPARISON:  None  TECHNIQUE: CT examination of the chest, abdomen and pelvis was performed  Multiplanar 2D reformatted images were created from the source data  This examination, like all CT scans performed in the [place], was performed utilizing techniques to minimize radiation dose exposure, including the use of iterative reconstruction and automated exposure control  Radiation dose length product (DLP) for this visit:  1334 mGy-cm IV Contrast:  100 mL of iohexol (OMNIPAQUE) Enteric Contrast: Enteric contrast was administered  FINDINGS: CHEST LUNGS:  Lungs are clear  There is no tracheal or endobronchial lesion  PLEURA:  Unremarkable  HEART/GREAT VESSELS: Heart is unremarkable for patient's age  No thoracic aortic aneurysm  MEDIASTINUM AND MATT:  Unremarkable  CHEST WALL AND LOWER NECK: There is a large left thyroid nodule with substernal extension   This measures at least 7 cm craniocaudal  Incidental discovery of one or more thyroid nodule(s) measuring more than 1 5 cm and without suspicious features is noted  in this patient who is above 28years old; according to guidelines published in the February 2015 white paper on incidental thyroid nodules in the Journal of the [employer] of Radiology Khalida Clifford), further characterization with thyroid ultrasound is recommended  ABDOMEN LIVER/BILIARY TREE:  Unremarkable  GALLBLADDER:  No calcified gallstones  No pericholecystic inflammatory change  SPLEEN:  Unremarkable  PANCREAS:  Unremarkable  ADRENAL GLANDS:  Unremarkable  KIDNEYS/URETERS: The left kidney appears mildly atrophic one or more sharply circumscribed subcentimeter renal hypodensities are noted  These lesions are too small to accurately characterize, but are statistically most likely to represent benign cortical  renal cyst(s)  According to the guidelines published in the [facility] Paper of the ACR Incidental Findings Committee (Radiology 2010), no further workup of these lesions is recommended    No hydronephrosis  STOMACH AND BOWEL: There is colonic diverticulosis without evidence of acute diverticulitis  APPENDIX:  No findings to suggest appendicitis  ABDOMINOPELVIC CAVITY:  No ascites  No pneumoperitoneum  No lymphadenopathy  VESSELS:  Unremarkable for patient's age  PELVIS REPRODUCTIVE ORGANS:  Unremarkable for patient's age  URINARY BLADDER:  Unremarkable  ABDOMINAL WALL/INGUINAL REGIONS: Bilateral fat-containing inguinal hernias  There is a small lipoma in the left posterior abdominal wall  OSSEOUS STRUCTURES: No acute fracture or destructive osseous lesion  Spinal degenerative changes are noted  Impression: 1  No acute abnormality in the chest, abdomen, or pelvis  2   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  Follow-up ultrasound is recommended  The study was marked in [facility] for immediate notification   Workstation performed: [id number]   Answers for HPI/ROS submitted by the patient on 5/16/2023  Chronicity: new  Onset: in the past 7 days  Onset quality: sudden  Frequency: 2 to 4 times per day  Episode duration: 1 Hours  Progression since onset: waxing and waning  Pain location: LUQ, RUQ, epigastric region, left flank, right flank  Pain - numeric: 9/10  Pain quality: aching, cramping, sharp  Radiates to: LUQ, RUQ, left shoulder, right shoulder, chest, perineum, scrotum  anorexia: No  arthralgias: Yes  belching: Yes  constipation: Yes  diarrhea: Yes  dysuria: No  fever: No  flatus: Yes  frequency: Yes  headaches: Yes  hematochezia: Yes  melena:  Yes  myalgias: Yes  nausea: Yes  weight loss: No  vomiting: No  Aggravated by: coughing, movement  Relieved by: nothing  Diagnostic workup: lower endoscopy, ultrasound

## 2023-05-23 ENCOUNTER — APPOINTMENT (OUTPATIENT)
Dept: LAB | Facility: HOSPITAL | Age: 58
End: 2023-05-23
Attending: INTERNAL MEDICINE

## 2023-05-23 DIAGNOSIS — I10 ESSENTIAL HYPERTENSION: ICD-10-CM

## 2023-05-23 DIAGNOSIS — R73.03 PREDIABETES: ICD-10-CM

## 2023-05-23 LAB
ALBUMIN SERPL BCP-MCNC: 4.4 G/DL (ref 3.5–5)
ALP SERPL-CCNC: 70 U/L (ref 34–104)
ALT SERPL W P-5'-P-CCNC: 44 U/L (ref 7–52)
ANION GAP SERPL CALCULATED.3IONS-SCNC: 8 MMOL/L (ref 4–13)
AST SERPL W P-5'-P-CCNC: 24 U/L (ref 13–39)
BASOPHILS # BLD AUTO: 0.02 THOUSANDS/ÂΜL (ref 0–0.1)
BASOPHILS NFR BLD AUTO: 1 % (ref 0–1)
BILIRUB SERPL-MCNC: 0.61 MG/DL (ref 0.2–1)
BUN SERPL-MCNC: 23 MG/DL (ref 5–25)
CALCIUM SERPL-MCNC: 9.5 MG/DL (ref 8.4–10.2)
CHLORIDE SERPL-SCNC: 106 MMOL/L (ref 96–108)
CHOLEST SERPL-MCNC: 177 MG/DL
CO2 SERPL-SCNC: 25 MMOL/L (ref 21–32)
CREAT SERPL-MCNC: 1.63 MG/DL (ref 0.6–1.3)
EOSINOPHIL # BLD AUTO: 0.04 THOUSAND/ÂΜL (ref 0–0.61)
EOSINOPHIL NFR BLD AUTO: 1 % (ref 0–6)
ERYTHROCYTE [DISTWIDTH] IN BLOOD BY AUTOMATED COUNT: 13.9 % (ref 11.6–15.1)
EST. AVERAGE GLUCOSE BLD GHB EST-MCNC: 140 MG/DL
GFR SERPL CREATININE-BSD FRML MDRD: 45 ML/MIN/1.73SQ M
GLUCOSE P FAST SERPL-MCNC: 115 MG/DL (ref 65–99)
HBA1C MFR BLD: 6.5 %
HCT VFR BLD AUTO: 46.9 % (ref 36.5–49.3)
HDLC SERPL-MCNC: 46 MG/DL
HGB BLD-MCNC: 15 G/DL (ref 12–17)
IMM GRANULOCYTES # BLD AUTO: 0.02 THOUSAND/UL (ref 0–0.2)
IMM GRANULOCYTES NFR BLD AUTO: 1 % (ref 0–2)
LDLC SERPL CALC-MCNC: 114 MG/DL (ref 0–100)
LYMPHOCYTES # BLD AUTO: 1.48 THOUSANDS/ÂΜL (ref 0.6–4.47)
LYMPHOCYTES NFR BLD AUTO: 37 % (ref 14–44)
MCH RBC QN AUTO: 29.8 PG (ref 26.8–34.3)
MCHC RBC AUTO-ENTMCNC: 32 G/DL (ref 31.4–37.4)
MCV RBC AUTO: 93 FL (ref 82–98)
MONOCYTES # BLD AUTO: 0.3 THOUSAND/ÂΜL (ref 0.17–1.22)
MONOCYTES NFR BLD AUTO: 7 % (ref 4–12)
NEUTROPHILS # BLD AUTO: 2.18 THOUSANDS/ÂΜL (ref 1.85–7.62)
NEUTS SEG NFR BLD AUTO: 53 % (ref 43–75)
NRBC BLD AUTO-RTO: 0 /100 WBCS
PLATELET # BLD AUTO: 205 THOUSANDS/UL (ref 149–390)
PMV BLD AUTO: 9.8 FL (ref 8.9–12.7)
POTASSIUM SERPL-SCNC: 4.6 MMOL/L (ref 3.5–5.3)
PROT SERPL-MCNC: 7.2 G/DL (ref 6.4–8.4)
RBC # BLD AUTO: 5.03 MILLION/UL (ref 3.88–5.62)
SODIUM SERPL-SCNC: 139 MMOL/L (ref 135–147)
TRIGL SERPL-MCNC: 85 MG/DL
TSH SERPL DL<=0.05 MIU/L-ACNC: 0.83 UIU/ML (ref 0.45–4.5)
WBC # BLD AUTO: 4.04 THOUSAND/UL (ref 4.31–10.16)

## 2023-05-24 ENCOUNTER — OFFICE VISIT (OUTPATIENT)
Dept: INTERNAL MEDICINE CLINIC | Facility: CLINIC | Age: 58
End: 2023-05-24

## 2023-05-24 VITALS
BODY MASS INDEX: 34.06 KG/M2 | HEIGHT: 73 IN | HEART RATE: 87 BPM | DIASTOLIC BLOOD PRESSURE: 68 MMHG | SYSTOLIC BLOOD PRESSURE: 96 MMHG | WEIGHT: 257 LBS | OXYGEN SATURATION: 96 % | RESPIRATION RATE: 16 BRPM

## 2023-05-24 DIAGNOSIS — K59.01 SLOW TRANSIT CONSTIPATION: ICD-10-CM

## 2023-05-24 DIAGNOSIS — E04.1 THYROID NODULE: Primary | ICD-10-CM

## 2023-05-24 DIAGNOSIS — R10.9 FLANK PAIN: ICD-10-CM

## 2023-05-24 DIAGNOSIS — R10.32 LLQ PAIN: ICD-10-CM

## 2023-05-24 DIAGNOSIS — R73.03 PREDIABETES: ICD-10-CM

## 2023-05-24 DIAGNOSIS — R10.84 GENERALIZED ABDOMINAL PAIN: ICD-10-CM

## 2023-05-24 RX ORDER — SODIUM, POTASSIUM,MAG SULFATES 17.5-3.13G
177 SOLUTION, RECONSTITUTED, ORAL ORAL ONCE
Qty: 177 ML | Refills: 0 | Status: SHIPPED | OUTPATIENT
Start: 2023-05-24 | End: 2023-05-24

## 2023-05-24 NOTE — PROGRESS NOTES
Assessment/Plan:     Yanely Collier is here for ER f/u; was seen for abdominal and chest pain, imaging revealing a very large thyroid nodule, ? Atrophic kidney, and no other significant abnormality seen in the chest/abdomen/pelvis; he saw GI who recommended bowel prep and miralax everyday thereafter; he reports having constipation and diarrhea; had a normal colonoscopy recently; very tender on exam all over the abdomen; denies n/v/urinary complaints  Will obtain thyroid US/ abdominal US/kidney and bladder US; Check a1c in 2 months; now he is pre diabetic with a1c 6 5;     Quality Measures:     BMI Counseling: Body mass index is 33 91 kg/m²  The BMI is above normal  Nutrition recommendations include decreasing portion sizes and encouraging healthy choices of fruits and vegetables  Exercise recommendations include moderate physical activity 150 minutes/week  Rationale for BMI follow-up plan is due to patient being overweight or obese  Return in about 4 weeks (around 6/21/2023)  No problem-specific Assessment & Plan notes found for this encounter  Diagnoses and all orders for this visit:    Thyroid nodule  -     US thyroid; Future    Slow transit constipation  -     Na Sulfate-K Sulfate-Mg Sulf 17 5-3 13-1 6 GM/177ML SOLN; Take 177 mL by mouth 1 (one) time for 1 dose    Flank pain  -     US kidney and bladder; Future    LLQ pain    Generalized abdominal pain  -     US abdomen complete; Future    Prediabetes  -     Hemoglobin A1C; Future          Subjective:      Patient ID: Evin Dean is a 59 y o  male  Here for er f/u        ALLERGIES:  No Known Allergies    CURRENT MEDICATIONS:    Current Outpatient Medications:   •  amLODIPine-benazepril (LOTREL) 10-40 MG per capsule, Take 1 capsule by mouth daily, Disp: 90 capsule, Rfl: 3  •  cloNIDine (CATAPRES-TTS-3) 0 3 mg/24 hr, Place 1 patch (0 3 mg total) on the skin once a week, Disp: 12 patch, Rfl: 3  •  cyclobenzaprine (FLEXERIL) 10 mg tablet, Take 1 tablet (10 mg total) by mouth 2 (two) times a day as needed for muscle spasms, Disp: 20 tablet, Rfl: 0  •  dicyclomine (BENTYL) 20 mg tablet, Take 1 tablet (20 mg total) by mouth 2 (two) times a day, Disp: 20 tablet, Rfl: 0  •  methocarbamol (ROBAXIN) 500 mg tablet, Take 1 tablet (500 mg total) by mouth 4 (four) times a day as needed for muscle spasms, Disp: 60 tablet, Rfl: 0  •  multivitamin (THERAGRAN) TABS, Take 1 tablet by mouth daily, Disp: , Rfl:   •  Na Sulfate-K Sulfate-Mg Sulf 17 5-3 13-1 6 GM/177ML SOLN, Take 177 mL by mouth 1 (one) time for 1 dose, Disp: 177 mL, Rfl: 0  •  Omega-3 Fatty Acids (fish oil) 1,000 mg, Take 1,000 mg by mouth daily, Disp: , Rfl:   •  sildenafil (VIAGRA) 100 mg tablet, Take 1 tablet (100 mg total) by mouth daily as needed for erectile dysfunction, Disp: 100 tablet, Rfl: 3    ACTIVE PROBLEM LIST:  Patient Active Problem List   Diagnosis   • Stage 3a chronic kidney disease (HCC)   • Essential hypertension   • Chronic kidney disease-mineral and bone disorder   • History of colonic polyps   • Current moderate episode of major depressive disorder without prior episode (HCC)       PAST MEDICAL HISTORY:  Past Medical History:   Diagnosis Date   • Chronic kidney disease    • Colon polyp    • Epididymitis 8/18/22   • Hypertension        PAST SURGICAL HISTORY:  Past Surgical History:   Procedure Laterality Date   • COLONOSCOPY     • SHOULDER SURGERY Left 10/2022       FAMILY HISTORY:  Family History   Problem Relation Age of Onset   • Hypertension Mother    • No Known Problems Father    • Cancer Sister    • Cancer Brother    • No Known Problems Brother    • No Known Problems Brother        SOCIAL HISTORY:  Social History     Socioeconomic History   • Marital status: /Civil Union     Spouse name: Not on file   • Number of children: Not on file   • Years of education: Not on file   • Highest education level: Not on file   Occupational History   • Not on file   Tobacco Use   • Smoking "status: Never   • Smokeless tobacco: Never   Vaping Use   • Vaping Use: Never used   Substance and Sexual Activity   • Alcohol use: Yes     Comment: Once a week   • Drug use: Never   • Sexual activity: Yes     Partners: Female   Other Topics Concern   • Not on file   Social History Narrative   • Not on file     Social Determinants of Health     Financial Resource Strain: Not on file   Food Insecurity: Not on file   Transportation Needs: Not on file   Physical Activity: Not on file   Stress: Not on file   Social Connections: Not on file   Intimate Partner Violence: Not on file   Housing Stability: Not on file       Review of Systems   Constitutional: Negative for chills and fever  HENT: Negative for ear pain and sore throat  Eyes: Negative for pain and visual disturbance  Respiratory: Negative for cough and shortness of breath  Cardiovascular: Negative for chest pain and palpitations  Gastrointestinal: Positive for abdominal distention, abdominal pain, constipation and diarrhea  Negative for vomiting  Genitourinary: Positive for flank pain  Negative for dysuria and hematuria  Musculoskeletal: Positive for arthralgias, back pain, neck pain and neck stiffness  Skin: Negative for color change and rash  Neurological: Negative for seizures and syncope  All other systems reviewed and are negative  Objective:  Vitals:    05/24/23 1556   BP: 96/68   BP Location: Left arm   Patient Position: Sitting   Cuff Size: Adult   Pulse: 87   Resp: 16   SpO2: 96%   Weight: 117 kg (257 lb)   Height: 6' 1\" (1 854 m)     Body mass index is 33 91 kg/m²  Physical Exam  Vitals and nursing note reviewed  Constitutional:       Appearance: Normal appearance  He is obese  HENT:      Head: Normocephalic and atraumatic  Neck:      Thyroid: Thyromegaly present  Cardiovascular:      Rate and Rhythm: Normal rate     Pulmonary:      Effort: Pulmonary effort is normal    Abdominal:      General: Bowel sounds are " normal  There is distension  Tenderness: There is abdominal tenderness  There is guarding  Musculoskeletal:         General: Normal range of motion  Cervical back: Normal range of motion  Skin:     General: Skin is warm and dry  Neurological:      General: No focal deficit present  Mental Status: He is alert and oriented to person, place, and time  Mental status is at baseline  Psychiatric:         Mood and Affect: Mood normal            RESULTS:    In chart    This note was created with voice recognition software  Phonic, grammatical and spelling errors may be present within the note as a result

## 2023-06-09 ENCOUNTER — HOSPITAL ENCOUNTER (OUTPATIENT)
Dept: ULTRASOUND IMAGING | Facility: HOSPITAL | Age: 58
End: 2023-06-09
Attending: INTERNAL MEDICINE
Payer: COMMERCIAL

## 2023-06-09 DIAGNOSIS — R10.84 GENERALIZED ABDOMINAL PAIN: ICD-10-CM

## 2023-06-09 DIAGNOSIS — R10.9 FLANK PAIN: ICD-10-CM

## 2023-06-09 DIAGNOSIS — E04.1 THYROID NODULE: ICD-10-CM

## 2023-06-09 PROCEDURE — 76700 US EXAM ABDOM COMPLETE: CPT

## 2023-06-09 PROCEDURE — 76536 US EXAM OF HEAD AND NECK: CPT

## 2023-06-24 ENCOUNTER — RA CDI HCC (OUTPATIENT)
Dept: OTHER | Facility: HOSPITAL | Age: 58
End: 2023-06-24

## 2023-06-24 NOTE — PROGRESS NOTES
[facility]  coding opportunities       Chart reviewed, no opportunity found:   [address]        Patients Insurance     Medicare Insurance: [employer]

## 2023-07-05 ENCOUNTER — TELEPHONE (OUTPATIENT)
Dept: PAIN MEDICINE | Facility: CLINIC | Age: 58
End: 2023-07-05

## 2023-07-05 NOTE — TELEPHONE ENCOUNTER
Patient was rescheduled per his request to 8.1.23. Pre Procedure were reviewed again with the patient.

## 2023-07-05 NOTE — TELEPHONE ENCOUNTER
Caller: Marilee Gramajo PT    Doctor: Dr Toshia Russell    Reason for call: Pt called in to reschedule the procedure     Call back#: [phone]

## 2023-07-14 ENCOUNTER — OFFICE VISIT (OUTPATIENT)
Dept: INTERNAL MEDICINE CLINIC | Facility: CLINIC | Age: 58
End: 2023-07-14
Payer: COMMERCIAL

## 2023-07-14 VITALS
SYSTOLIC BLOOD PRESSURE: 130 MMHG | DIASTOLIC BLOOD PRESSURE: 78 MMHG | WEIGHT: 254.2 LBS | BODY MASS INDEX: 33.69 KG/M2 | HEART RATE: 62 BPM | HEIGHT: 73 IN | OXYGEN SATURATION: 94 %

## 2023-07-14 DIAGNOSIS — N52.9 ERECTILE DYSFUNCTION, UNSPECIFIED ERECTILE DYSFUNCTION TYPE: ICD-10-CM

## 2023-07-14 DIAGNOSIS — R53.83 OTHER FATIGUE: ICD-10-CM

## 2023-07-14 DIAGNOSIS — R16.0 HEPATOMEGALY: ICD-10-CM

## 2023-07-14 DIAGNOSIS — E04.1 THYROID NODULE: Primary | ICD-10-CM

## 2023-07-14 DIAGNOSIS — K40.20 BILATERAL INGUINAL HERNIA WITHOUT OBSTRUCTION OR GANGRENE, RECURRENCE NOT SPECIFIED: ICD-10-CM

## 2023-07-14 DIAGNOSIS — F32.9 MAJOR DEPRESSIVE DISORDER WITH SINGLE EPISODE, REMISSION STATUS UNSPECIFIED: ICD-10-CM

## 2023-07-14 DIAGNOSIS — R68.82 DECREASED LIBIDO: ICD-10-CM

## 2023-07-14 PROCEDURE — 99214 OFFICE O/P EST MOD 30 MIN: CPT | Performed by: INTERNAL MEDICINE

## 2023-07-14 RX ORDER — BUPROPION HYDROCHLORIDE 150 MG/1
150 TABLET ORAL EVERY MORNING
Qty: 30 TABLET | Refills: 5 | Status: SHIPPED | OUTPATIENT
Start: 2023-07-14 | End: 2024-01-10

## 2023-07-14 RX ORDER — TADALAFIL 5 MG/1
5 TABLET ORAL DAILY PRN
Qty: 10 TABLET | Refills: 0 | Status: SHIPPED | OUTPATIENT
Start: 2023-07-14

## 2023-07-14 NOTE — PROGRESS NOTES
Assessment/Plan:     Neri Chauhan is here for f/u, recent thyroid US revealing heterogenous gland, TSH normal, will obtain thyroid AB and keep checking function; may need replacement in the future, we went over the CT, HPM and hepatic steatosis noted, vit e suggested along with weight loss; LFT's normal;     Reports inguinal pain, general surgery for hernias;     decreased libido, fatigue, weight gain; will check testosterone, did tell him it is normal to be low with obesity and as men age; For his low mood, will start Wellbutrin 150 mg.     Quality Measures:     BMI Counseling: Body mass index is 33.54 kg/m². The BMI is above normal. Nutrition recommendations include decreasing portion sizes and encouraging healthy choices of fruits and vegetables. Exercise recommendations include moderate physical activity 150 minutes/week. Rationale for BMI follow-up plan is due to patient being overweight or obese. Return in about 2 months (around 9/14/2023). No problem-specific Assessment & Plan notes found for this encounter. Diagnoses and all orders for this visit:    Thyroid nodule  -     Thyroid Antibodies Panel; Future    Hepatomegaly    Bilateral inguinal hernia without obstruction or gangrene, recurrence not specified  -     Ambulatory Referral to General Surgery; Future    Erectile dysfunction, unspecified erectile dysfunction type  -     tadalafil (CIALIS) 5 MG tablet; Take 1 tablet (5 mg total) by mouth daily as needed for erectile dysfunction    Decreased libido  -     Testosterone, free, total; Future    Other fatigue  -     Testosterone, free, total; Future    Major depressive disorder with single episode, remission status unspecified  -     buPROPion (WELLBUTRIN XL) 150 mg 24 hr tablet; Take 1 tablet (150 mg total) by mouth every morning          Subjective:      Patient ID: Brandon Jo is a 59 y.o. male. Here for f/u.       ALLERGIES:  No Known Allergies    CURRENT MEDICATIONS:    Current Outpatient Medications:   •  amLODIPine-benazepril (LOTREL) 10-40 MG per capsule, Take 1 capsule by mouth daily, Disp: 90 capsule, Rfl: 3  •  buPROPion (WELLBUTRIN XL) 150 mg 24 hr tablet, Take 1 tablet (150 mg total) by mouth every morning, Disp: 30 tablet, Rfl: 5  •  cloNIDine (CATAPRES-TTS-3) 0.3 mg/24 hr, Place 1 patch (0.3 mg total) on the skin once a week, Disp: 12 patch, Rfl: 3  •  cyclobenzaprine (FLEXERIL) 10 mg tablet, Take 1 tablet (10 mg total) by mouth 2 (two) times a day as needed for muscle spasms, Disp: 20 tablet, Rfl: 0  •  dicyclomine (BENTYL) 20 mg tablet, Take 1 tablet (20 mg total) by mouth 2 (two) times a day, Disp: 20 tablet, Rfl: 0  •  methocarbamol (ROBAXIN) 500 mg tablet, Take 1 tablet (500 mg total) by mouth 4 (four) times a day as needed for muscle spasms, Disp: 60 tablet, Rfl: 0  •  multivitamin (THERAGRAN) TABS, Take 1 tablet by mouth daily, Disp: , Rfl:   •  Omega-3 Fatty Acids (fish oil) 1,000 mg, Take 1,000 mg by mouth daily, Disp: , Rfl:   •  tadalafil (CIALIS) 5 MG tablet, Take 1 tablet (5 mg total) by mouth daily as needed for erectile dysfunction, Disp: 10 tablet, Rfl: 0    ACTIVE PROBLEM LIST:  Patient Active Problem List   Diagnosis   • Stage 3a chronic kidney disease (HCC)   • Essential hypertension   • Chronic kidney disease-mineral and bone disorder   • History of colonic polyps   • Current moderate episode of major depressive disorder without prior episode (HCC)       PAST MEDICAL HISTORY:  Past Medical History:   Diagnosis Date   • Chronic kidney disease    • Colon polyp    • Epididymitis 8/18/22   • Hypertension        PAST SURGICAL HISTORY:  Past Surgical History:   Procedure Laterality Date   • COLONOSCOPY     • SHOULDER SURGERY Left 10/2022       FAMILY HISTORY:  Family History   Problem Relation Age of Onset   • Hypertension Mother    • Arthritis Mother    • No Known Problems Father    • Cancer Sister    • Cancer Brother    • No Known Problems Brother    • No Known Problems Brother        SOCIAL HISTORY:  Social History     Socioeconomic History   • Marital status: /Civil Union     Spouse name: Not on file   • Number of children: Not on file   • Years of education: Not on file   • Highest education level: Not on file   Occupational History   • Not on file   Tobacco Use   • Smoking status: Never   • Smokeless tobacco: Never   Vaping Use   • Vaping Use: Never used   Substance and Sexual Activity   • Alcohol use: Yes     Alcohol/week: 1.0 standard drink of alcohol     Types: 1 Standard drinks or equivalent per week     Comment: Once a week   • Drug use: Never   • Sexual activity: Yes     Partners: Female   Other Topics Concern   • Not on file   Social History Narrative   • Not on file     Social Determinants of Health     Financial Resource Strain: Not on file   Food Insecurity: Not on file   Transportation Needs: Not on file   Physical Activity: Not on file   Stress: Not on file   Social Connections: Not on file   Intimate Partner Violence: Not on file   Housing Stability: Not on file       Review of Systems   Constitutional: Positive for activity change, fatigue and unexpected weight change. Negative for chills and fever. HENT: Negative for ear pain and sore throat. Eyes: Negative for pain and visual disturbance. Respiratory: Negative for cough and shortness of breath. Cardiovascular: Negative for chest pain and palpitations. Gastrointestinal: Negative for abdominal pain and vomiting. Genitourinary: Negative for dysuria and hematuria. Decreased libido   Musculoskeletal: Negative for arthralgias and back pain. Skin: Negative for color change and rash. Neurological: Negative for seizures and syncope. Psychiatric/Behavioral: The patient is nervous/anxious. All other systems reviewed and are negative.         Objective:  Vitals:    07/14/23 1059   BP: 130/78   BP Location: Left arm   Patient Position: Sitting   Cuff Size: Large   Pulse: 62 SpO2: 94%   Weight: 115 kg (254 lb 3.2 oz)   Height: 6' 1" (1.854 m)     Body mass index is 33.54 kg/m². Physical Exam  Vitals and nursing note reviewed. Constitutional:       Appearance: Normal appearance. He is obese. HENT:      Head: Normocephalic and atraumatic. Cardiovascular:      Rate and Rhythm: Normal rate. Pulmonary:      Effort: Pulmonary effort is normal.   Musculoskeletal:         General: Normal range of motion. Cervical back: Normal range of motion. Skin:     General: Skin is warm and dry. Neurological:      General: No focal deficit present. Mental Status: He is alert and oriented to person, place, and time. Mental status is at baseline. Psychiatric:         Mood and Affect: Mood normal.           RESULTS:    In chart    This note was created with voice recognition software. Phonic, grammatical and spelling errors may be present within the note as a result.

## 2023-07-18 ENCOUNTER — TELEPHONE (OUTPATIENT)
Dept: SURGERY | Facility: CLINIC | Age: 58
End: 2023-07-18

## 2023-07-18 ENCOUNTER — CONSULT (OUTPATIENT)
Dept: SURGERY | Facility: CLINIC | Age: 58
End: 2023-07-18
Payer: COMMERCIAL

## 2023-07-18 VITALS
DIASTOLIC BLOOD PRESSURE: 80 MMHG | SYSTOLIC BLOOD PRESSURE: 142 MMHG | OXYGEN SATURATION: 98 % | WEIGHT: 251.6 LBS | TEMPERATURE: 97.9 F | HEIGHT: 73 IN | RESPIRATION RATE: 16 BRPM | HEART RATE: 75 BPM | BODY MASS INDEX: 33.34 KG/M2

## 2023-07-18 DIAGNOSIS — E04.0 GOITER DIFFUSE, NONTOXIC: Primary | ICD-10-CM

## 2023-07-18 DIAGNOSIS — K40.20 BILATERAL INGUINAL HERNIA WITHOUT OBSTRUCTION OR GANGRENE, RECURRENCE NOT SPECIFIED: ICD-10-CM

## 2023-07-18 PROCEDURE — 99204 OFFICE O/P NEW MOD 45 MIN: CPT | Performed by: SURGERY

## 2023-07-18 RX ORDER — HEPARIN SODIUM 5000 [USP'U]/ML
5000 INJECTION, SOLUTION INTRAVENOUS; SUBCUTANEOUS ONCE
OUTPATIENT
Start: 2023-07-18 | End: 2023-07-18

## 2023-07-18 RX ORDER — SODIUM CHLORIDE, SODIUM LACTATE, POTASSIUM CHLORIDE, CALCIUM CHLORIDE 600; 310; 30; 20 MG/100ML; MG/100ML; MG/100ML; MG/100ML
125 INJECTION, SOLUTION INTRAVENOUS
OUTPATIENT
Start: 2023-07-18 | End: 2023-07-19

## 2023-07-18 NOTE — PROGRESS NOTES
Consult- General Surgery   Quiana Oviedo 59 y.o. male MRN: [id number]  Unit/Bed#:  Encounter: [id number]    Assessment/Plan     Assessment:  Bilateral inguinal hernia, right greater than the left  Goiter with deviation of trachea  History of hypertension  History of chronic kidney disease  Plan:  The patient will be referred to surgical oncology due to the large goiter and deviation of the trachea before proceeding with inguinal hernia. I also advised the patient to undergo laparoscopic preperitoneal bilateral inguinal hernia repair with mesh after cleared by surgical oncology from thyroid goiter. I discussed the operative procedure, risk, benefits and alternatives, but not limited to bleeding, infection after surgery, recurrence, injury to adjacent organs, need for furher operations, loss of time from work, the patient understood and agreed to proceed with the above surgery. History of Present Illness     HPI:  Quiana Oviedo is a 59 y.o. male who presents to my office for evaluation of inguinal hernia. The patient has been complaining of pain and discomfort from the right groin for the past 6 months, he stated that the pain and the intensity of the pain has increased by sitting down and lifting, the pain goes down to his right testicle. He denies having any nausea, vomiting, diarrhea, constipation or urinary symptoms. He has not seen a bulge from the right groin. I review CT scan of the chest, abdomen and pelvis from May of this year. I also noted on the report and CT scan that the patient has a large goiter with deviation of the trachea to the right, for which the patient denies having any difficulty swallowing or breathing. Review of Systems  The rest of the review of system total of 10 were negative except for the HPI.     Historical Information   Past Medical History:   Diagnosis Date   • Chronic kidney disease    • Colon polyp    • Epididymitis 8/18/22   • Hypertension      Past Surgical History:   Procedure Laterality Date   • COLONOSCOPY     • COLONOSCOPY     • SHOULDER SURGERY Left 10/2022   • WISDOM TOOTH EXTRACTION       Social History   Social History     Substance and Sexual Activity   Alcohol Use Yes   • Alcohol/week: 1.0 standard drink of alcohol   • Types: 1 Standard drinks or equivalent per week    Comment: Once a week     Social History     Substance and Sexual Activity   Drug Use Never     Social History     Tobacco Use   Smoking Status Never   Smokeless Tobacco Never     Family History: non-contributory    Meds/Allergies   all medications and allergies reviewed     Current Outpatient Medications:   •  amLODIPine-benazepril (LOTREL) 10-40 MG per capsule, Take 1 capsule by mouth daily, Disp: 90 capsule, Rfl: 3  •  buPROPion (WELLBUTRIN XL) 150 mg 24 hr tablet, Take 1 tablet (150 mg total) by mouth every morning, Disp: 30 tablet, Rfl: 5  •  cloNIDine (CATAPRES-TTS-3) 0.3 mg/24 hr, Place 1 patch (0.3 mg total) on the skin once a week, Disp: 12 patch, Rfl: 3  •  multivitamin (THERAGRAN) TABS, Take 1 tablet by mouth daily, Disp: , Rfl:   •  Omega-3 Fatty Acids (fish oil) 1,000 mg, Take 1,000 mg by mouth daily, Disp: , Rfl:   •  tadalafil (CIALIS) 5 MG tablet, Take 1 tablet (5 mg total) by mouth daily as needed for erectile dysfunction, Disp: 10 tablet, Rfl: 0  No Known Allergies    Objective     Current Vitals:   Blood Pressure: 142/80 (07/18/23 1121)  Pulse: 75 (07/18/23 1121)  Temperature: 97.9 °F (36.6 °C) (07/18/23 1121)  Respirations: 16 (07/18/23 1121)  Height: 6' 1" (185.4 cm) (07/18/23 1121)  Weight - Scale: 114 kg (251 lb 9.6 oz) (07/18/23 1121)  SpO2: 98 % (07/18/23 1121)    Physical Exam  Vitals and nursing note reviewed. Constitutional:       General: He is not in acute distress. Neck:      Comments: There is a large left thyroid lobe lobe, tender to palpation suggestive of thyroiditis. Cardiovascular:      Rate and Rhythm: Normal rate and regular rhythm.       Heart sounds: No murmur heard. Pulmonary:      Effort: No respiratory distress. Breath sounds: Normal breath sounds. Abdominal:      Comments: Abdomen is soft, nondistended, nontender. There is no obvious visceromegaly or mass palpable. Detail examination of the right and left inguinal canal without Valsalva maneuver revealed bilateral inguinal hernia, right greater than the left. Musculoskeletal:      Cervical back: Neck supple. Skin:     General: Skin is warm. Coloration: Skin is not jaundiced. Findings: No erythema or rash. Neurological:      Mental Status: He is alert and oriented to person, place, and time. Cranial Nerves: No cranial nerve deficit. Psychiatric:         Mood and Affect: Mood normal.         Behavior: Behavior normal.       Imaging: I have personally reviewed pertinent reports. and I have personally reviewed pertinent films in PACS  Procedure: US abdomen complete    Result Date: 6/15/2023  Narrative: ABDOMEN ULTRASOUND, COMPLETE INDICATION:   R10.84: Generalized abdominal pain. COMPARISON: [facility] most recently CT 5/14/2023 TECHNIQUE:   Real-time ultrasound of the abdomen was performed with a curvilinear transducer with both volumetric sweeps and still imaging techniques. FINDINGS: PANCREAS:  Visualized portions of the pancreas are within normal limits. AORTA AND IVC:  Visualized portions are normal for patient age. LIVER: Size: Enlarged. The liver measures 17.0 cm in the midclavicular line. Contour:  Surface contour is smooth. Parenchyma: Echogenic hepatic parenchyma. The right hepatic lobe there is an area of decreased echogenicity, likely representing focal fatty sparing. Limited imaging of the main portal vein shows it to be patent and hepatopetal. BILIARY: No gallbladder findings. No intrahepatic biliary dilatation. CBD measures 5.0 mm. No choledocholithiasis. KIDNEY: Right kidney measures 10.6 x 7.4 x 6.5  cm. Volume 266.9 mL Kidney within normal limits.  Left kidney measures 10.8 x 7.1 x 5.1 cm. Volume 204.2 mL Kidney within normal limits. SPLEEN: Measures 9.2 x 8.5 x 5.1 cm. Volume 209.7 mL Within normal limits. ASCITES:  None. Impression: Hepatomegaly and hepatic steatosis. Workstation performed: [id number]     Procedure: US thyroid    Result Date: 6/15/2023  Narrative: THYROID ULTRASOUND INDICATION:    E04.1: Nontoxic single thyroid nodule. COMPARISON: CT 5/14/2023 TECHNIQUE:   Ultrasound of the thyroid was performed with a high frequency linear transducer in transverse and sagittal planes including volumetric imaging sweeps as well as traditional still imaging technique. FINDINGS: Heterogeneous and enlarged left thyroid lobe. Right lobe: 5.8 x 1.6 x 1.7 cm. Volume 7.4 mL Left lobe:  9.1 x 3.5 x 4.6 cm. Volume 70.0 mL Isthmus: 0.3  cm. No discrete suspicious nodule identified. Impression: Heterogeneous and enlarged left lobe of the thyroid gland without discrete nodule. Relate with thyroid function testing and clinical presentation. Reference: ACR Thyroid Imaging, Reporting and Data System (TI-RADS): White Paper of the [employer].  J AM Juanito Radiol 2019;49:470-926. (additional recommendations based on American Thyroid Association 2015 guidelines.) Workstation performed: [id number]

## 2023-07-18 NOTE — TELEPHONE ENCOUNTER
Patient was given Dr. Marvin Hollis information to schedule his consult. He preferred to speak with his wife and check their schedule to then schedule the consult. I gave him the packet he is to call me when he schedules his consult with Dr. Radni Lucero so that we can see when we can go schedule.  Dr. Riccardo Fine prefers to wait for that case to be done first. Azithromycin Counseling:  I discussed with the patient the risks of azithromycin including but not limited to GI upset, allergic reaction, drug rash, diarrhea, and yeast infections.

## 2023-07-20 ENCOUNTER — APPOINTMENT (OUTPATIENT)
Dept: LAB | Facility: HOSPITAL | Age: 58
End: 2023-07-20
Payer: COMMERCIAL

## 2023-07-20 ENCOUNTER — TELEPHONE (OUTPATIENT)
Dept: SURGERY | Facility: CLINIC | Age: 58
End: 2023-07-20

## 2023-07-20 ENCOUNTER — CONSULT (OUTPATIENT)
Dept: SURGICAL ONCOLOGY | Facility: CLINIC | Age: 58
End: 2023-07-20
Payer: COMMERCIAL

## 2023-07-20 VITALS
OXYGEN SATURATION: 97 % | BODY MASS INDEX: 33.13 KG/M2 | TEMPERATURE: 98.3 F | HEART RATE: 69 BPM | DIASTOLIC BLOOD PRESSURE: 80 MMHG | SYSTOLIC BLOOD PRESSURE: 128 MMHG | HEIGHT: 73 IN | WEIGHT: 250 LBS | RESPIRATION RATE: 18 BRPM

## 2023-07-20 DIAGNOSIS — R73.03 PREDIABETES: ICD-10-CM

## 2023-07-20 DIAGNOSIS — E04.1 THYROID NODULE: ICD-10-CM

## 2023-07-20 DIAGNOSIS — E04.0 GOITER DIFFUSE, NONTOXIC: ICD-10-CM

## 2023-07-20 DIAGNOSIS — E04.9 ENLARGED THYROID: Primary | ICD-10-CM

## 2023-07-20 LAB
EST. AVERAGE GLUCOSE BLD GHB EST-MCNC: 134 MG/DL
HBA1C MFR BLD: 6.3 %

## 2023-07-20 PROCEDURE — 36415 COLL VENOUS BLD VENIPUNCTURE: CPT

## 2023-07-20 PROCEDURE — 83036 HEMOGLOBIN GLYCOSYLATED A1C: CPT

## 2023-07-20 PROCEDURE — 86800 THYROGLOBULIN ANTIBODY: CPT

## 2023-07-20 PROCEDURE — 99205 OFFICE O/P NEW HI 60 MIN: CPT | Performed by: SURGERY

## 2023-07-20 PROCEDURE — 86376 MICROSOMAL ANTIBODY EACH: CPT

## 2023-07-20 NOTE — PROGRESS NOTES
Surgical Oncology Consult Note       [facility]  [facility] [address] [place] [address]    Pedro Hernandez  1965  [id number]      Chief Complaint   Patient presents with   • Consult     NP-Ref by Gen Surg Dr. Josephine Hernandez Goiter diffuse, nontoxic.   large goiter deviation of trachea per gen surg  6/9/23 US thyroid enlarged left lobe of thyroid gland w/out discrete nodule left lobe 9.1cm  5/23/23 TSH 0.832 normal        Assessment/Plan    1. Enlarged thyroid  -     CT soft tissue neck w wo contrast; Future; Expected date: 07/20/2023    2. Goiter diffuse, nontoxic  -     Ambulatory Referral to Surgical Oncology  -     CT soft tissue neck w wo contrast; Future; Expected date: 07/20/2023         Oncology History    No history exists. 73-year-old gentleman in the process of getting bilateral inguinal hernia repair. He has a known enlarged goiter with choking symptoms. Patient has been seen by general surgery and concern for tracheal deviation. He is clinically euthyroid he had a thyroid ultrasound as well as CT chest abdomen pelvis. He is retired being a . He has some mild hoarseness and states difficulty swallowing. Review of Systems   Constitutional: Negative for activity change, chills and fever. HENT: Positive for trouble swallowing and voice change. Negative for congestion, ear pain and sore throat. Eyes: Negative for pain and visual disturbance. Respiratory: Negative for cough and shortness of breath. Cardiovascular: Negative for chest pain, palpitations and leg swelling. Gastrointestinal: Negative for abdominal pain and vomiting. Endocrine: Negative for cold intolerance, heat intolerance, polydipsia, polyphagia and polyuria. Genitourinary: Negative for dysuria and hematuria. Musculoskeletal: Negative for arthralgias and back pain. Skin: Negative for color change and rash. Neurological: Negative for dizziness, seizures, syncope, facial asymmetry and headaches. Hematological: Negative for adenopathy. Does not bruise/bleed easily. Psychiatric/Behavioral: Negative for agitation and confusion. The patient is nervous/anxious. The patient is not hyperactive. All other systems reviewed and are negative. Past Medical History:      Patient Active Problem List   Diagnosis   • Stage 3a chronic kidney disease ([address])   • Essential hypertension   • Chronic kidney disease-mineral and bone disorder   • History of colonic polyps   • Current moderate episode of major depressive disorder without prior episode ([address])   • Enlarged thyroid        Past Medical History:   Diagnosis Date   • Chronic kidney disease    • Colon polyp    • Depression    • Epididymitis 8/18/22   • Hypertension         Past Surgical History:   Procedure Laterality Date   • COLONOSCOPY     • COLONOSCOPY     • SHOULDER SURGERY Left 10/2022   • WISDOM TOOTH EXTRACTION          Family History   Problem Relation Age of Onset   • Hypertension Mother    • Arthritis Mother    • No Known Problems Father    • Pancreatic cancer Sister    • Other Sister         AIDS   • Pancreatic cancer Brother    • No Known Problems Brother    • No Known Problems Brother         Social History     Socioeconomic History   • Marital status: /Civil Union     Spouse name: Not on file   • Number of children: Not on file   • Years of education: Not on file   • Highest education level: Not on file   Occupational History   • Not on file   Tobacco Use   • Smoking status: Never   • Smokeless tobacco: Never   Vaping Use   • Vaping Use: Never used   Substance and Sexual Activity   • Alcohol use:  Yes     Alcohol/week: 1.0 standard drink of alcohol     Types: 1 Standard drinks or equivalent per week     Comment: Once a week   • Drug use: Never   • Sexual activity: Yes     Partners: Female   Other Topics Concern   • Not on file   Social History Narrative   • Not on file     Social Determinants of Health     Financial Resource Strain: Not on file   Food Insecurity: Not on file   Transportation Needs: Not on file   Physical Activity: Not on file   Stress: Not on file   Social Connections: Not on file   Intimate Partner Violence: Not on file   Housing Stability: Not on file        Current Outpatient Medications:   •  amLODIPine-benazepril (LOTREL) 10-40 MG per capsule, Take 1 capsule by mouth daily, Disp: 90 capsule, Rfl: 3  •  buPROPion (WELLBUTRIN XL) 150 mg 24 hr tablet, Take 1 tablet (150 mg total) by mouth every morning, Disp: 30 tablet, Rfl: 5  •  cloNIDine (CATAPRES-TTS-3) 0.3 mg/24 hr, Place 1 patch (0.3 mg total) on the skin once a week, Disp: 12 patch, Rfl: 3  •  multivitamin (THERAGRAN) TABS, Take 1 tablet by mouth daily, Disp: , Rfl:   •  Omega-3 Fatty Acids (fish oil) 1,000 mg, Take 1,000 mg by mouth daily, Disp: , Rfl:   •  tadalafil (CIALIS) 5 MG tablet, Take 1 tablet (5 mg total) by mouth daily as needed for erectile dysfunction, Disp: 10 tablet, Rfl: 0   No Known Allergies    Physical Exam:     Vitals:    07/20/23 1335   BP: 128/80   Pulse: 69   Resp: 18   Temp: 98.3 °F (36.8 °C)   SpO2: 97%     Physical Exam  Constitutional:       Appearance: Normal appearance. He is obese. HENT:      Head: Normocephalic. Nose: Nose normal.      Mouth/Throat:      Mouth: Mucous membranes are moist.   Neck:      Comments: Thyroid gland most so in the left side concerning for retrosternal extension diffusely enlarged with mild deviation of the trachea. Cardiovascular:      Rate and Rhythm: Normal rate and regular rhythm. Pulses: Normal pulses. Pulmonary:      Effort: Pulmonary effort is normal. No respiratory distress. Breath sounds: No stridor. No wheezing, rhonchi or rales. Chest:      Chest wall: No tenderness. Abdominal:      General: Abdomen is flat. There is no distension. Palpations: Abdomen is soft. Tenderness:  There is no abdominal tenderness. There is no guarding. Musculoskeletal:         General: No swelling or deformity. Cervical back: No rigidity or tenderness. Right lower leg: No edema. Left lower leg: No edema. Lymphadenopathy:      Cervical: No cervical adenopathy. Skin:     Coloration: Skin is not jaundiced. Neurological:      Mental Status: He is alert. Cranial Nerves: No cranial nerve deficit. Sensory: No sensory deficit. Psychiatric:         Mood and Affect: Mood normal.         Behavior: Behavior normal.         Thought Content: Thought content normal.         Judgment: Judgment normal.         Results:     Narrative & Impression   THYROID ULTRASOUND     INDICATION:    E04.1: Nontoxic single thyroid nodule.     COMPARISON: CT 5/14/2023     TECHNIQUE:   Ultrasound of the thyroid was performed with a high frequency linear transducer in transverse and sagittal planes including volumetric imaging sweeps as well as traditional still imaging technique.     FINDINGS: Heterogeneous and enlarged left thyroid lobe.     Right lobe: 5.8 x 1.6 x 1.7 cm. Volume 7.4 mL  Left lobe:  9.1 x 3.5 x 4.6 cm. Volume 70.0 mL  Isthmus: 0.3  cm.     No discrete suspicious nodule identified.     IMPRESSION:     Heterogeneous and enlarged left lobe of the thyroid gland without discrete nodule. Relate with thyroid function testing and clinical presentation.               Discussion/Summary:   Is a 49-year-old gentleman clinically euthyroid heterogeneously large thyroid gland with symptoms. He is clinically euthyroid. He had a CT chest abdomen pelvis which partially scan next looks like the left thyroid lobe is enlarged even possible retrosternal extension. We did discuss with him in detail the nature of enlarged thyroid gland and if it is symptomatic possible surgical resection. However I would like to have a CT neck soft tissue to delineate this thyroid gland more so to delineate retrosternal extension. He understand will have a conversation and need for the CT scan. He will come and see us after the studies. Advance Care Planning/Advance Directives: Ernie Pavon MD discussed the disease status, and treatment plans with Dafne Castro today 07/20/23 and will follow-up with the patient.

## 2023-07-20 NOTE — TELEPHONE ENCOUNTER
Patient is calling to let office know that he has changed CT scan appointment and would like to change his Dr appointment to be seen sooner.  Please f/u

## 2023-07-20 NOTE — TELEPHONE ENCOUNTER
Per Dr. Kendrick Pepe he wants the pt to see Dr. Priya Gutierrez first for the thyroid. He felt that should be handled sooner than IHR. Pt saw Dr. Priya Gutierrez and was scheduled for a ct-scan on 08/02/23. Once he does that and sees Dr. Priya Gutierrez for results he will then call me to let me know his outcome with Dr. Priya Gutierrez so that we can move forward with IHR with Dr. Kendrick Pepe.

## 2023-07-21 ENCOUNTER — TELEPHONE (OUTPATIENT)
Dept: NEPHROLOGY | Facility: CLINIC | Age: 58
End: 2023-07-21

## 2023-07-21 LAB
THYROGLOB AB SERPL-ACNC: <1 IU/ML (ref 0–0.9)
THYROPEROXIDASE AB SERPL-ACNC: <9 IU/ML (ref 0–34)

## 2023-07-25 ENCOUNTER — TELEPHONE (OUTPATIENT)
Dept: HEMATOLOGY ONCOLOGY | Facility: CLINIC | Age: 58
End: 2023-07-25

## 2023-07-25 NOTE — TELEPHONE ENCOUNTER
Appointment Change  Cancel, Reschedule, Change to Virtual      Who are you speaking with? Doc office   If it is not the patient, are they listed on an active communication consent form? yes   Which provider is the appointment scheduled with? [facility]   When is the appointment scheduled? Please list date and time 9/6   At which location is the appointment scheduled to take place? [facility]   Was the appointment rescheduled or changed from an in person visit to a virtual visit? If so, please list the details of the change. Yes, 8/23 1:15pm   What is the reason for the appointment change? sooner   Was STAR transport scheduled for this visit? no   Does STAR transport need to be scheduled for the new visit (if applicable) no   Does the patient need an infusion appointment rescheduled? no   Does the patient have an infusion appointment scheduled? If so, when? no   Is the patient undergoing chemotherapy? no   Was the no-show policy reviewed for appointments being changed with less then 24 hours of notice?  yes

## 2023-07-28 ENCOUNTER — TELEPHONE (OUTPATIENT)
Dept: NEPHROLOGY | Facility: CLINIC | Age: 58
End: 2023-07-28

## 2023-07-28 NOTE — TELEPHONE ENCOUNTER
I called and left message on patients ansewring machine confirming appointment for Monday 07/31/2023 with Dr. Haydee العلي

## 2023-07-31 ENCOUNTER — TELEPHONE (OUTPATIENT)
Dept: SURGICAL ONCOLOGY | Facility: CLINIC | Age: 58
End: 2023-07-31

## 2023-07-31 NOTE — TELEPHONE ENCOUNTER
Placed call to the patient and it went to . Left my call back information and message looking to discuss their insurance and it showing INACTIVE.

## 2023-08-03 ENCOUNTER — TELEPHONE (OUTPATIENT)
Dept: SURGICAL ONCOLOGY | Facility: CLINIC | Age: 58
End: 2023-08-03

## 2023-08-03 NOTE — TELEPHONE ENCOUNTER
Called patient and rescheduled appt with Dr. Analy Isbell to be after CT. Patient is scheduled on 9/8/23 [facility]

## 2023-08-08 DIAGNOSIS — F32.9 MAJOR DEPRESSIVE DISORDER WITH SINGLE EPISODE, REMISSION STATUS UNSPECIFIED: ICD-10-CM

## 2023-08-08 RX ORDER — BUPROPION HYDROCHLORIDE 150 MG/1
150 TABLET ORAL EVERY MORNING
Qty: 90 TABLET | Refills: 2 | Status: SHIPPED | OUTPATIENT
Start: 2023-08-08

## 2023-08-25 ENCOUNTER — TELEPHONE (OUTPATIENT)
Dept: SURGICAL ONCOLOGY | Facility: CLINIC | Age: 58
End: 2023-08-25

## 2023-08-25 NOTE — TELEPHONE ENCOUNTER
Oncology Finance Advocacy Intake and Intervention  Oncology Finance Counselor/Advocate placed call to patient. This writer informed patient that this writer is here to assist patient with billing questions, financial assistance, payment/payment plans, quotes, copayment assistance, insurance optimization, and insurance navigation. This writer conducted a thorough benefit review of copayment, deductible, and out of pocket cost. This information is documented below and has been reviewed with patient. Copayment:  Deductible:  Out of Pocket Cost:Currently INACTIVE  Insurance optimization (Limited benefit vs self-pay):  Patient assistance status:Patient outreach  Free Drug Applications:NO  Interventions:  Received an e mail form Carmenc[employer] A regarding the patient's status on his insurance. So I placed call to the patient regarding his insurance coming up as "INACTIVE" he stated it was to be re-instated per their insurance after 14 business which should be no later than Monday. He will call the clinical staff in the event he has to reschedule his appointment on 09/01/23 CT Scan. I also saw he has a Self pay balance that he may be eligible for assistance with the [employer] [phone] so an application request will be sent to them for possible assistance. My contact information is here on [place] should the patient have any additional questions or concerns that I can address. Adding myself to the patient Care Team.        Information above was review thoroughly with patient and patient was advise of possible assistance programs/interventions. If any question arise patient can contact this writer at below information. This information was given to patient at time of contact. Conner Batres  Phone:[phone]  Email: [email or web address]

## 2023-09-14 ENCOUNTER — TELEPHONE (OUTPATIENT)
Dept: SURGICAL ONCOLOGY | Facility: CLINIC | Age: 58
End: 2023-09-14

## 2023-09-14 NOTE — TELEPHONE ENCOUNTER
Placed call to the patient that went to . I left my call back information and a message regarding the reinstatement of his insurance that was showing INACTIVE. I see in the notes that he NO SHOWED for his appointment on 09/01/23 CT.     Appointments for:  09/06/23: Reta Harvey  09/08/23: NO SHOW  Future appointment PLANNED for 09/19/23 (Office Visitt)

## 2023-09-19 ENCOUNTER — OFFICE VISIT (OUTPATIENT)
Dept: INTERNAL MEDICINE CLINIC | Facility: CLINIC | Age: 58
End: 2023-09-19

## 2023-09-19 VITALS
TEMPERATURE: 98.6 F | HEART RATE: 84 BPM | WEIGHT: 262.6 LBS | BODY MASS INDEX: 34.8 KG/M2 | HEIGHT: 73 IN | RESPIRATION RATE: 18 BRPM | DIASTOLIC BLOOD PRESSURE: 78 MMHG | OXYGEN SATURATION: 95 % | SYSTOLIC BLOOD PRESSURE: 126 MMHG

## 2023-09-19 DIAGNOSIS — H53.8 BLURRY VISION: ICD-10-CM

## 2023-09-19 DIAGNOSIS — M54.41 ACUTE RIGHT-SIDED LOW BACK PAIN WITH RIGHT-SIDED SCIATICA: Primary | ICD-10-CM

## 2023-09-19 DIAGNOSIS — I10 ESSENTIAL HYPERTENSION: ICD-10-CM

## 2023-09-19 DIAGNOSIS — Z23 ENCOUNTER FOR IMMUNIZATION: ICD-10-CM

## 2023-09-19 DIAGNOSIS — F32.1 CURRENT MODERATE EPISODE OF MAJOR DEPRESSIVE DISORDER WITHOUT PRIOR EPISODE (HCC): ICD-10-CM

## 2023-09-19 PROCEDURE — 90471 IMMUNIZATION ADMIN: CPT | Performed by: INTERNAL MEDICINE

## 2023-09-19 PROCEDURE — 99214 OFFICE O/P EST MOD 30 MIN: CPT | Performed by: INTERNAL MEDICINE

## 2023-09-19 PROCEDURE — 90677 PCV20 VACCINE IM: CPT | Performed by: INTERNAL MEDICINE

## 2023-09-19 RX ORDER — GABAPENTIN 300 MG/1
300 CAPSULE ORAL 3 TIMES DAILY
COMMUNITY
Start: 2023-09-15 | End: 2023-11-14

## 2023-09-19 NOTE — PROGRESS NOTES
Assessment/Plan:     She is here for 2-month check. Reports having trouble with insurance. But he should be getting new coverage in the next month. Is alert to follow-up on. Following with surgical oncology and pain management. Reports low back pain radiating down his leg, numbness and tingling. CAT scan is in the system. Recommend to continue follow-up with pain management, obtain EMG. Pneumococcal vaccine today. For his vision, see ophthalmology. Denies any jaw pain when chewing. Scalp pain. Quality Measures:     BMI Counseling: Body mass index is 34.65 kg/m². The BMI is above normal. Nutrition recommendations include decreasing portion sizes and encouraging healthy choices of fruits and vegetables. Exercise recommendations include moderate physical activity 150 minutes/week. Rationale for BMI follow-up plan is due to patient being overweight or obese. Return in about 2 months (around 11/19/2023). No problem-specific Assessment & Plan notes found for this encounter. Diagnoses and all orders for this visit:    Acute right-sided low back pain with right-sided sciatica  -     Cancel: XR spine lumbar minimum 4 views non injury; Future  -     EMG 1 Limb; Future    Essential hypertension    Current moderate episode of major depressive disorder without prior episode ([address])    Blurry vision    Encounter for immunization  -     Pneumococcal Conjugate Vaccine 20-valent (Pcv20)    Other orders  -     gabapentin (NEURONTIN) 300 mg capsule; Take 300 mg by mouth Three times a day          Subjective:      Patient ID: Lopez Tanner is a 59 y.o. male. Here for 2 month f/u.       ALLERGIES:  No Known Allergies    CURRENT MEDICATIONS:    Current Outpatient Medications:   •  amLODIPine-benazepril (LOTREL) 10-40 MG per capsule, Take 1 capsule by mouth daily, Disp: 90 capsule, Rfl: 3  •  buPROPion (WELLBUTRIN XL) 150 mg 24 hr tablet, TAKE 1 TABLET BY MOUTH EVERY DAY IN THE MORNING, Disp: 90 tablet, Rfl: 2  •  cloNIDine (CATAPRES-TTS-3) 0.3 mg/24 hr, Place 1 patch (0.3 mg total) on the skin once a week, Disp: 12 patch, Rfl: 3  •  gabapentin (NEURONTIN) 300 mg capsule, Take 300 mg by mouth Three times a day, Disp: , Rfl:   •  multivitamin (THERAGRAN) TABS, Take 1 tablet by mouth daily, Disp: , Rfl:   •  Omega-3 Fatty Acids (fish oil) 1,000 mg, Take 1,000 mg by mouth daily, Disp: , Rfl:   •  tadalafil (CIALIS) 5 MG tablet, Take 1 tablet (5 mg total) by mouth daily as needed for erectile dysfunction, Disp: 10 tablet, Rfl: 0    ACTIVE PROBLEM LIST:  Patient Active Problem List   Diagnosis   • Stage 3a chronic kidney disease ([address])   • Essential hypertension   • Chronic kidney disease-mineral and bone disorder   • History of colonic polyps   • Current moderate episode of major depressive disorder without prior episode (HCC)   • Enlarged thyroid       PAST MEDICAL HISTORY:  Past Medical History:   Diagnosis Date   • Chronic kidney disease    • Colon polyp    • Depression    • Epididymitis 8/18/22   • Hypertension        PAST SURGICAL HISTORY:  Past Surgical History:   Procedure Laterality Date   • COLONOSCOPY     • COLONOSCOPY     • SHOULDER SURGERY Left 10/2022   • WISDOM TOOTH EXTRACTION         FAMILY HISTORY:  Family History   Problem Relation Age of Onset   • Hypertension Mother    • Arthritis Mother    • No Known Problems Father    • Pancreatic cancer Sister    • Other Sister         AIDS   • Pancreatic cancer Brother    • No Known Problems Brother    • No Known Problems Brother        SOCIAL HISTORY:  Social History     Socioeconomic History   • Marital status: /Civil Union     Spouse name: Not on file   • Number of children: Not on file   • Years of education: Not on file   • Highest education level: Not on file   Occupational History   • Not on file   Tobacco Use   • Smoking status: Never   • Smokeless tobacco: Never   Vaping Use   • Vaping Use: Never used   Substance and Sexual Activity   • Alcohol use: Yes     Alcohol/week: 1.0 standard drink of alcohol     Types: 1 Standard drinks or equivalent per week     Comment: Once a week   • Drug use: Never   • Sexual activity: Yes     Partners: Female   Other Topics Concern   • Not on file   Social History Narrative   • Not on file     Social Determinants of Health     Financial Resource Strain: Not on file   Food Insecurity: Not on file   Transportation Needs: Not on file   Physical Activity: Not on file   Stress: Not on file   Social Connections: Not on file   Intimate Partner Violence: Not on file   Housing Stability: Not on file       Review of Systems   Constitutional: Negative for chills and fever. HENT: Negative for ear pain and sore throat. Eyes: Positive for photophobia and visual disturbance. Negative for pain. Respiratory: Negative for cough and shortness of breath. Cardiovascular: Negative for chest pain and palpitations. Gastrointestinal: Negative for abdominal pain and vomiting. Genitourinary: Negative for dysuria and hematuria. Musculoskeletal: Positive for arthralgias. Negative for back pain. Pain, sitting down for long periods of time, like driving  Right leg,   Skin: Negative for color change and rash. Neurological: Negative for seizures and syncope. All other systems reviewed and are negative. Objective:  Vitals:    09/19/23 1142   BP: 126/78   BP Location: Left arm   Patient Position: Sitting   Cuff Size: Large   Pulse: 84   Resp: 18   Temp: 98.6 °F (37 °C)   TempSrc: Tympanic   SpO2: 95%   Weight: 119 kg (262 lb 9.6 oz)   Height: 6' 1" (1.854 m)     Body mass index is 34.65 kg/m². Physical Exam  Vitals and nursing note reviewed. Constitutional:       Appearance: Normal appearance. He is obese. HENT:      Head: Normocephalic and atraumatic. Cardiovascular:      Rate and Rhythm: Normal rate. Heart sounds: Normal heart sounds.    Pulmonary:      Effort: Pulmonary effort is normal.      Breath sounds: Normal breath sounds. Musculoskeletal:         General: Normal range of motion. Cervical back: Normal range of motion. Skin:     General: Skin is warm and dry. Neurological:      General: No focal deficit present. Mental Status: He is alert and oriented to person, place, and time. Mental status is at baseline. Gait: Gait abnormal.   Psychiatric:         Mood and Affect: Mood normal.           RESULTS:  Hemoglobin A1C   Date/Time Value Ref Range Status   07/20/2023 11:40 AM 6.3 (H) Normal 3.8-5.6%; PreDiabetic 5.7-6.4%; Diabetic >=6.5%; Glycemic control for adults with diabetes <7.0% % Final     Cholesterol   Date/Time Value Ref Range Status   05/23/2023 10:54  See Comment mg/dL Final     Comment:     Cholesterol:         Pediatric <18 Years        Desirable          <170 mg/dL      Borderline High    170-199 mg/dL      High               >=200 mg/dL        Adult >=18 Years            Desirable        <200 mg/dL      Borderline High  200-239 mg/dL      High             >239 mg/dL       Triglycerides   Date/Time Value Ref Range Status   05/23/2023 10:54 AM 85 See Comment mg/dL Final     Comment:     Triglyceride:     0-9Y            <75mg/dL     10Y-17Y         <90 mg/dL       >=18Y     Normal          <150 mg/dL     Borderline High 150-199 mg/dL     High            200-499 mg/dL        Very High       >499 mg/dL    Specimen collection should occur prior to N-Acetylcysteine or Metamizole administration due to the potential for falsely depressed results.      HDL, Direct   Date/Time Value Ref Range Status   05/23/2023 10:54 AM 46 >=40 mg/dL Final     LDL Calculated   Date/Time Value Ref Range Status   05/23/2023 10:54  (H) 0 - 100 mg/dL Final     Comment:     LDL Cholesterol:     Optimal           <100 mg/dl     Near Optimal      100-129 mg/dl     Above Optimal       Borderline High 130-159 mg/dl       High            160-189 mg/dl       Very High       >189 mg/dl         This screening LDL is a calculated result. It does not have the accuracy of the Direct Measured LDL in the monitoring of patients with hyperlipidemia and/or statin therapy. Direct Measure LDL (SNO571) must be ordered separately in these patients. Hemoglobin   Date/Time Value Ref Range Status   05/23/2023 10:54 AM 15.0 12.0 - 17.0 g/dL Final     Hematocrit   Date/Time Value Ref Range Status   05/23/2023 10:54 AM 46.9 36.5 - 49.3 % Final     Platelets   Date/Time Value Ref Range Status   05/23/2023 10:54  149 - 390 Thousands/uL Final     Prostate Specific Antigen Total   Date/Time Value Ref Range Status   05/28/2022 09:08 AM 1.9 0.0 - 4.0 ng/mL Final     Comment:     Roche ECLIA methodology. According to the American Urological Association, Serum PSA should  decrease and remain at undetectable levels after radical  prostatectomy. The AUA defines biochemical recurrence as an initial  PSA value 0.2 ng/mL or greater followed by a subsequent confirmatory  PSA value 0.2 ng/mL or greater. Values obtained with different assay methods or kits cannot be used  interchangeably. Results cannot be interpreted as absolute evidence  of the presence or absence of malignant disease. TSH 3RD GENERATON   Date/Time Value Ref Range Status   05/23/2023 10:54 AM 0.832 0.450 - 4.500 uIU/mL Final     Comment:     Adult TSH (3rd generation) reference range follows the recommended guidelines of the American Thyroid Association, January, 2020. Sodium   Date/Time Value Ref Range Status   05/23/2023 10:54  135 - 147 mmol/L Final     BUN   Date/Time Value Ref Range Status   05/23/2023 10:54 AM 23 5 - 25 mg/dL Final     Creatinine   Date/Time Value Ref Range Status   05/23/2023 10:54 AM 1.63 (H) 0.60 - 1.30 mg/dL Final     Comment:     Standardized to IDMS reference method      In chart    This note was created with voice recognition software.   Phonic, grammatical and spelling errors may be present within the note as a result.

## 2023-10-19 ENCOUNTER — TELEPHONE (OUTPATIENT)
Dept: INTERNAL MEDICINE CLINIC | Facility: CLINIC | Age: 58
End: 2023-10-19

## 2023-10-19 NOTE — TELEPHONE ENCOUNTER
Spoke with Ernestine from [facility] AT THE [facility]. We did not refer the patient to there office. Pt pain management doctor did. Did advised of Dr. Virgil Solis message.

## 2023-10-19 NOTE — TELEPHONE ENCOUNTER
ENT called and asked if we can put in an order for a: Fine Needle Biopsy for Thyroid Mass?     ASAP please     Patient is there now and we referred him to ENT

## 2023-10-23 ENCOUNTER — TELEPHONE (OUTPATIENT)
Dept: NEPHROLOGY | Facility: CLINIC | Age: 58
End: 2023-10-23

## 2023-10-23 DIAGNOSIS — N18.31 STAGE 3A CHRONIC KIDNEY DISEASE (HCC): Primary | ICD-10-CM

## 2023-10-23 NOTE — TELEPHONE ENCOUNTER
Called spoke with patient advised patient to get labs done prior to 10/31/23 fu appt with .  Patient understood and I okay with it.

## 2023-10-25 ENCOUNTER — TELEPHONE (OUTPATIENT)
Dept: NEPHROLOGY | Facility: CLINIC | Age: 58
End: 2023-10-25

## 2023-10-25 NOTE — TELEPHONE ENCOUNTER
I called and spoke to the patient about obtaining his new insurance information. Patient stated that he is waiting for his wife retiree insurance to come in. I did explain to the patient that I was setting him up as a selfpay with an [employer] for his appointment on 10/31/2023 nephrology follow up with Dr. Morrow April that he will be responsible to pay a $30 co-pay for the day of his visit and then he will be billed for the rest of the balance. The patient understood and was okay with it.

## 2023-10-27 ENCOUNTER — TELEPHONE (OUTPATIENT)
Dept: NEPHROLOGY | Facility: CLINIC | Age: 58
End: 2023-10-27

## 2023-10-27 NOTE — TELEPHONE ENCOUNTER
Patient has an appointment with Dr. Florencio Fletcher schedule for 10/31/2023. Patient called the office requesting for his lab work to be faxed to [facility] to have labs done prior appointment.  Labs fax to [phone] as per patients request.

## 2023-10-30 ENCOUNTER — TELEPHONE (OUTPATIENT)
Dept: NEPHROLOGY | Facility: CLINIC | Age: 58
End: 2023-10-30

## 2023-10-31 ENCOUNTER — OFFICE VISIT (OUTPATIENT)
Dept: NEPHROLOGY | Facility: CLINIC | Age: 58
End: 2023-10-31

## 2023-10-31 VITALS
WEIGHT: 267.2 LBS | HEART RATE: 66 BPM | OXYGEN SATURATION: 98 % | RESPIRATION RATE: 18 BRPM | SYSTOLIC BLOOD PRESSURE: 120 MMHG | HEIGHT: 73 IN | DIASTOLIC BLOOD PRESSURE: 80 MMHG | TEMPERATURE: 97.8 F | BODY MASS INDEX: 35.41 KG/M2

## 2023-10-31 DIAGNOSIS — N18.31 STAGE 3A CHRONIC KIDNEY DISEASE (HCC): Primary | ICD-10-CM

## 2023-10-31 DIAGNOSIS — N18.9 CHRONIC KIDNEY DISEASE-MINERAL AND BONE DISORDER: ICD-10-CM

## 2023-10-31 DIAGNOSIS — E83.9 CHRONIC KIDNEY DISEASE-MINERAL AND BONE DISORDER: ICD-10-CM

## 2023-10-31 DIAGNOSIS — M89.9 CHRONIC KIDNEY DISEASE-MINERAL AND BONE DISORDER: ICD-10-CM

## 2023-10-31 DIAGNOSIS — I10 ESSENTIAL HYPERTENSION: ICD-10-CM

## 2023-10-31 PROCEDURE — 99214 OFFICE O/P EST MOD 30 MIN: CPT | Performed by: INTERNAL MEDICINE

## 2023-10-31 RX ORDER — PREGABALIN 50 MG/1
50 CAPSULE ORAL 3 TIMES DAILY
COMMUNITY
Start: 2023-10-12 | End: 2023-10-31

## 2023-10-31 NOTE — ASSESSMENT & PLAN NOTE
Lab Results   Component Value Date    EGFR 45 05/23/2023    EGFR 40 05/14/2023    EGFR 47 01/26/2023    CREATININE 1.63 (H) 05/23/2023    CREATININE 1.78 (H) 05/14/2023    CREATININE 1.56 (H) 01/26/2023   Patient renal function is quite stable. Some fluctuation is there based on volume.   Advise hydration and avoiding nephrotoxic medication

## 2023-10-31 NOTE — PROGRESS NOTES
NEPHROLOGY OFFICE FOLLOW UP  Tali Nelson 59 y.o. male MRN: [id number]    Encounter: [id number] 10/31/2023    REASON FOR VISIT: Tali Nelson is a 59 y.o. male who is here on 10/31/2023 for Chronic Kidney Disease and Follow-up  . HPI:    Josiah Walker came in today for follow-up of CKD. 69-year-old gentleman with hypertension and CKD. Overall doing quite well and denies any acute complaint    Patient had a surgery done on his left shoulder and has some pain but no other acute complaint    Denies taking nonsteroidal painkiller    No chest pain no palpitation    No nausea no vomiting        REVIEW OF SYSTEMS:    Review of Systems   Constitutional:  Negative for activity change and fatigue. HENT:  Negative for congestion and ear discharge. Eyes:  Negative for photophobia and pain. Respiratory:  Negative for apnea and choking. Cardiovascular:  Negative for chest pain and palpitations. Gastrointestinal:  Negative for abdominal distention and blood in stool. Endocrine: Negative for heat intolerance and polyphagia. Genitourinary:  Negative for flank pain and urgency. Musculoskeletal:  Negative for neck pain and neck stiffness. Skin:  Negative for color change and wound. Allergic/Immunologic: Negative for food allergies and immunocompromised state. Neurological:  Negative for seizures and facial asymmetry. Hematological:  Negative for adenopathy. Does not bruise/bleed easily. Psychiatric/Behavioral:  Negative for self-injury and suicidal ideas.           PAST MEDICAL HISTORY:  Past Medical History:   Diagnosis Date    Chronic kidney disease     Colon polyp     Depression     Epididymitis 8/18/22    Hypertension        PAST SURGICAL HISTORY:  Past Surgical History:   Procedure Laterality Date    COLONOSCOPY      COLONOSCOPY      SHOULDER SURGERY Left 10/2022    WISDOM TOOTH EXTRACTION         SOCIAL HISTORY:  Social History     Substance and Sexual Activity   Alcohol Use Yes    Alcohol/week: 1.0 standard drink of alcohol    Types: 1 Standard drinks or equivalent per week    Comment: Once a week     Social History     Substance and Sexual Activity   Drug Use Never     Social History     Tobacco Use   Smoking Status Never    Passive exposure: Never   Smokeless Tobacco Never       FAMILY HISTORY:  Family History   Problem Relation Age of Onset    Hypertension Mother     Arthritis Mother     No Known Problems Father     Pancreatic cancer Sister     Other Sister         AIDS    Pancreatic cancer Brother     No Known Problems Brother     No Known Problems Brother        MEDICATIONS:    Current Outpatient Medications:     amLODIPine-benazepril (LOTREL) 10-40 MG per capsule, Take 1 capsule by mouth daily, Disp: 90 capsule, Rfl: 3    buPROPion (WELLBUTRIN XL) 150 mg 24 hr tablet, TAKE 1 TABLET BY MOUTH EVERY DAY IN THE MORNING, Disp: 90 tablet, Rfl: 2    cloNIDine (CATAPRES-TTS-3) 0.3 mg/24 hr, Place 1 patch (0.3 mg total) on the skin once a week, Disp: 12 patch, Rfl: 3    gabapentin (NEURONTIN) 300 mg capsule, Take 300 mg by mouth Three times a day, Disp: , Rfl:     multivitamin (THERAGRAN) TABS, Take 1 tablet by mouth daily, Disp: , Rfl:     Omega-3 Fatty Acids (fish oil) 1,000 mg, Take 1,000 mg by mouth daily, Disp: , Rfl:     tadalafil (CIALIS) 5 MG tablet, Take 1 tablet (5 mg total) by mouth daily as needed for erectile dysfunction, Disp: 10 tablet, Rfl: 0    PHYSICAL EXAM:  Vitals:    10/31/23 1503   BP: 120/80   BP Location: Right arm   Patient Position: Sitting   Pulse: 66   Resp: 18   Temp: 97.8 °F (36.6 °C)   TempSrc: Temporal   SpO2: 98%   Weight: 121 kg (267 lb 3.2 oz)   Height: 6' 1" (1.854 m)     Body mass index is 35.25 kg/m². Physical Exam  Constitutional:       General: He is not in acute distress. Appearance: He is well-developed. HENT:      Head: Normocephalic. Mouth/Throat:      Mouth: Mucous membranes are moist.   Eyes:      General: No scleral icterus.      Conjunctiva/sclera: Conjunctivae normal.   Neck:      Vascular: No JVD. Cardiovascular:      Rate and Rhythm: Normal rate. Heart sounds: Normal heart sounds. Pulmonary:      Effort: Pulmonary effort is normal.      Breath sounds: No wheezing. Abdominal:      Palpations: Abdomen is soft. Tenderness: There is no abdominal tenderness. Musculoskeletal:         General: Normal range of motion. Cervical back: Neck supple. Skin:     General: Skin is warm. Findings: No rash. Neurological:      Mental Status: He is alert and oriented to person, place, and time. Psychiatric:         Behavior: Behavior normal.         LAB RESULTS:  Results for orders placed or performed in visit on 07/20/23   Hemoglobin A1C   Result Value Ref Range    Hemoglobin A1C 6.3 (H) Normal 3.8-5.6%; PreDiabetic 5.7-6.4%; Diabetic >=6.5%; Glycemic control for adults with diabetes <7.0% %     mg/dl   Anti-microsomal antibody   Result Value Ref Range    THYROID MICROSOMAL ANTIBODY <9 0 - 34 IU/mL   Anti-thyroglobulin antibody   Result Value Ref Range    Thyroglobulin Ab <1.0 0.0 - 0.9 IU/mL       ASSESSMENT and PLAN:      Stage 3a chronic kidney disease (HCC)  Lab Results   Component Value Date    EGFR 45 05/23/2023    EGFR 40 05/14/2023    EGFR 47 01/26/2023    CREATININE 1.63 (H) 05/23/2023    CREATININE 1.78 (H) 05/14/2023    CREATININE 1.56 (H) 01/26/2023   Patient renal function is quite stable. Some fluctuation is there based on volume.   Advise hydration and avoiding nephrotoxic medication    Chronic kidney disease-mineral and bone disorder  Lab Results   Component Value Date    EGFR 45 05/23/2023    EGFR 40 05/14/2023    EGFR 47 01/26/2023    CREATININE 1.63 (H) 05/23/2023    CREATININE 1.78 (H) 05/14/2023    CREATININE 1.56 (H) 01/26/2023   PTH and phosphorus along with vitamin D are within acceptable range and will continue to monitor    Essential hypertension  Blood pressure is very well controlled with present medication which include benazepril        Everything discussed with the patient at length. I will see him back in 1 year again. Advised to get blood test and urine test before that visit      Portions of the record may have been created with voice recognition software. Occasional wrong word or "sound a like" substitutions may have occurred due to the inherent limitations of voice recognition software. Read the chart carefully and recognize, using context, where substitutions have occurred. If you have any questions, please contact the dictating provider.

## 2023-10-31 NOTE — ASSESSMENT & PLAN NOTE
Lab Results   Component Value Date    EGFR 45 05/23/2023    EGFR 40 05/14/2023    EGFR 47 01/26/2023    CREATININE 1.63 (H) 05/23/2023    CREATININE 1.78 (H) 05/14/2023    CREATININE 1.56 (H) 01/26/2023   PTH and phosphorus along with vitamin D are within acceptable range and will continue to monitor

## 2023-11-20 ENCOUNTER — OFFICE VISIT (OUTPATIENT)
Dept: INTERNAL MEDICINE CLINIC | Facility: CLINIC | Age: 58
End: 2023-11-20

## 2023-11-20 VITALS
DIASTOLIC BLOOD PRESSURE: 78 MMHG | SYSTOLIC BLOOD PRESSURE: 128 MMHG | BODY MASS INDEX: 35.65 KG/M2 | HEIGHT: 73 IN | WEIGHT: 269 LBS | OXYGEN SATURATION: 94 % | HEART RATE: 69 BPM

## 2023-11-20 DIAGNOSIS — F32.1 CURRENT MODERATE EPISODE OF MAJOR DEPRESSIVE DISORDER WITHOUT PRIOR EPISODE (HCC): ICD-10-CM

## 2023-11-20 DIAGNOSIS — I10 ESSENTIAL HYPERTENSION: Primary | ICD-10-CM

## 2023-11-20 PROCEDURE — 99213 OFFICE O/P EST LOW 20 MIN: CPT | Performed by: INTERNAL MEDICINE

## 2023-11-20 NOTE — PROGRESS NOTES
Assessment/Plan:     Patient is here for routine follow-up. Waiting on his medical insurance to start up. Discussed his chronic medical problems. Follow-up in 3 months. Quality Measures:     BMI Counseling: Body mass index is 35.49 kg/m². The BMI is above normal. Nutrition recommendations include decreasing portion sizes and encouraging healthy choices of fruits and vegetables. Exercise recommendations include moderate physical activity 150 minutes/week. Rationale for BMI follow-up plan is due to patient being overweight or obese. Return in about 3 months (around 2/20/2024). No problem-specific Assessment & Plan notes found for this encounter. Diagnoses and all orders for this visit:    Essential hypertension    Current moderate episode of major depressive disorder without prior episode (HCC)          Subjective:      Patient ID: José Miguel Bernstein is a 59 y.o. male. Here for routine follow up.         ALLERGIES:  No Known Allergies    CURRENT MEDICATIONS:    Current Outpatient Medications:     amLODIPine-benazepril (LOTREL) 10-40 MG per capsule, Take 1 capsule by mouth daily, Disp: 90 capsule, Rfl: 3    buPROPion (WELLBUTRIN XL) 150 mg 24 hr tablet, TAKE 1 TABLET BY MOUTH EVERY DAY IN THE MORNING, Disp: 90 tablet, Rfl: 2    cloNIDine (CATAPRES-TTS-3) 0.3 mg/24 hr, Place 1 patch (0.3 mg total) on the skin once a week, Disp: 12 patch, Rfl: 3    gabapentin (NEURONTIN) 300 mg capsule, Take 300 mg by mouth Three times a day, Disp: , Rfl:     multivitamin (THERAGRAN) TABS, Take 1 tablet by mouth daily, Disp: , Rfl:     Omega-3 Fatty Acids (fish oil) 1,000 mg, Take 1,000 mg by mouth daily, Disp: , Rfl:     tadalafil (CIALIS) 5 MG tablet, Take 1 tablet (5 mg total) by mouth daily as needed for erectile dysfunction, Disp: 10 tablet, Rfl: 0    ACTIVE PROBLEM LIST:  Patient Active Problem List   Diagnosis    Stage 3a chronic kidney disease (HCC)    Essential hypertension    Chronic kidney disease-mineral and bone disorder    History of colonic polyps    Current moderate episode of major depressive disorder without prior episode ([address])    Enlarged thyroid       PAST MEDICAL HISTORY:  Past Medical History:   Diagnosis Date    Chronic kidney disease     Colon polyp     Depression     Epididymitis 8/18/22    Hypertension        PAST SURGICAL HISTORY:  Past Surgical History:   Procedure Laterality Date    COLONOSCOPY      COLONOSCOPY      SHOULDER SURGERY Left 10/2022    US GUIDED THYROID BIOPSY  11/14/2023    WISDOM TOOTH EXTRACTION         FAMILY HISTORY:  Family History   Problem Relation Age of Onset    Hypertension Mother     Arthritis Mother     No Known Problems Father     Pancreatic cancer Sister     Other Sister         AIDS    Pancreatic cancer Brother     No Known Problems Brother     No Known Problems Brother        SOCIAL HISTORY:  Social History     Socioeconomic History    Marital status: /Civil Union     Spouse name: Not on file    Number of children: Not on file    Years of education: Not on file    Highest education level: Not on file   Occupational History    Not on file   Tobacco Use    Smoking status: Never     Passive exposure: Never    Smokeless tobacco: Never   Vaping Use    Vaping Use: Never used   Substance and Sexual Activity    Alcohol use:  Yes     Alcohol/week: 1.0 standard drink of alcohol     Types: 1 Standard drinks or equivalent per week     Comment: Once a week    Drug use: Never    Sexual activity: Yes     Partners: Female   Other Topics Concern    Not on file   Social History Narrative    Not on file     Social Determinants of Health     Financial Resource Strain: Not on file   Food Insecurity: Not on file   Transportation Needs: Not on file   Physical Activity: Not on file   Stress: Not on file   Social Connections: Not on file   Intimate Partner Violence: Not on file   Housing Stability: Not on file       Review of Systems   Constitutional:  Negative for chills and fever. HENT:  Positive for postnasal drip and sinus pain. Negative for ear pain and sore throat. Eyes:  Negative for pain and visual disturbance. Respiratory:  Positive for cough. Negative for shortness of breath. Cardiovascular:  Negative for chest pain and palpitations. Gastrointestinal:  Negative for abdominal pain and vomiting. Genitourinary:  Negative for dysuria and hematuria. Musculoskeletal:  Negative for arthralgias and back pain. Skin:  Negative for color change and rash. Neurological:  Negative for seizures and syncope. All other systems reviewed and are negative. Objective:  Vitals:    11/20/23 1144   BP: 128/78   BP Location: Right arm   Patient Position: Sitting   Cuff Size: Large   Pulse: 69   SpO2: 94%   Weight: 122 kg (269 lb)   Height: 6' 1" (1.854 m)     Body mass index is 35.49 kg/m². Physical Exam  Vitals and nursing note reviewed. Constitutional:       Appearance: Normal appearance. He is obese. HENT:      Head: Normocephalic and atraumatic. Cardiovascular:      Rate and Rhythm: Normal rate. Pulmonary:      Effort: Pulmonary effort is normal.   Musculoskeletal:         General: Normal range of motion. Cervical back: Normal range of motion. Skin:     General: Skin is warm and dry. Neurological:      General: No focal deficit present. Mental Status: He is alert and oriented to person, place, and time. Mental status is at baseline. Psychiatric:         Mood and Affect: Mood normal.           RESULTS:  Hemoglobin A1C   Date/Time Value Ref Range Status   07/20/2023 11:40 AM 6.3 (H) Normal 3.8-5.6%; PreDiabetic 5.7-6.4%;  Diabetic >=6.5%; Glycemic control for adults with diabetes <7.0% % Final     Cholesterol   Date/Time Value Ref Range Status   05/23/2023 10:54  See Comment mg/dL Final     Comment:     Cholesterol:         Pediatric <18 Years        Desirable          <170 mg/dL      Borderline High    170-199 mg/dL      High >=200 mg/dL        Adult >=18 Years            Desirable        <200 mg/dL      Borderline High  200-239 mg/dL      High             >239 mg/dL       Triglycerides   Date/Time Value Ref Range Status   05/23/2023 10:54 AM 85 See Comment mg/dL Final     Comment:     Triglyceride:     0-9Y            <75mg/dL     10Y-17Y         <90 mg/dL       >=18Y     Normal          <150 mg/dL     Borderline High 150-199 mg/dL     High            200-499 mg/dL        Very High       >499 mg/dL    Specimen collection should occur prior to N-Acetylcysteine or Metamizole administration due to the potential for falsely depressed results. HDL, Direct   Date/Time Value Ref Range Status   05/23/2023 10:54 AM 46 >=40 mg/dL Final     LDL Calculated   Date/Time Value Ref Range Status   05/23/2023 10:54  (H) 0 - 100 mg/dL Final     Comment:     LDL Cholesterol:     Optimal           <100 mg/dl     Near Optimal      100-129 mg/dl     Above Optimal       Borderline High 130-159 mg/dl       High            160-189 mg/dl       Very High       >189 mg/dl         This screening LDL is a calculated result. It does not have the accuracy of the Direct Measured LDL in the monitoring of patients with hyperlipidemia and/or statin therapy. Direct Measure LDL (OJK244) must be ordered separately in these patients. Hemoglobin   Date/Time Value Ref Range Status   05/23/2023 10:54 AM 15.0 12.0 - 17.0 g/dL Final     Hematocrit   Date/Time Value Ref Range Status   05/23/2023 10:54 AM 46.9 36.5 - 49.3 % Final     Platelets   Date/Time Value Ref Range Status   05/23/2023 10:54  149 - 390 Thousands/uL Final     Prostate Specific Antigen Total   Date/Time Value Ref Range Status   05/28/2022 09:08 AM 1.9 0.0 - 4.0 ng/mL Final     Comment:     Roche ECLIA methodology. According to the American Urological Association, Serum PSA should  decrease and remain at undetectable levels after radical  prostatectomy.  The AUA defines biochemical recurrence as an initial  PSA value 0.2 ng/mL or greater followed by a subsequent confirmatory  PSA value 0.2 ng/mL or greater. Values obtained with different assay methods or kits cannot be used  interchangeably. Results cannot be interpreted as absolute evidence  of the presence or absence of malignant disease. TSH 3RD GENERATON   Date/Time Value Ref Range Status   05/23/2023 10:54 AM 0.832 0.450 - 4.500 uIU/mL Final     Comment:     Adult TSH (3rd generation) reference range follows the recommended guidelines of the American Thyroid Association, January, 2020. Sodium   Date/Time Value Ref Range Status   05/23/2023 10:54  135 - 147 mmol/L Final     BUN   Date/Time Value Ref Range Status   05/23/2023 10:54 AM 23 5 - 25 mg/dL Final     Creatinine   Date/Time Value Ref Range Status   05/23/2023 10:54 AM 1.63 (H) 0.60 - 1.30 mg/dL Final     Comment:     Standardized to IDMS reference method      In chart    This note was created with voice recognition software. Phonic, grammatical and spelling errors may be present within the note as a result.

## 2023-11-30 DIAGNOSIS — I10 ESSENTIAL HYPERTENSION: ICD-10-CM

## 2023-11-30 RX ORDER — AMLODIPINE AND BENAZEPRIL HYDROCHLORIDE 10; 40 MG/1; MG/1
1 CAPSULE ORAL DAILY
Qty: 90 CAPSULE | Refills: 3 | Status: SHIPPED | OUTPATIENT
Start: 2023-11-30

## 2023-12-18 ENCOUNTER — DOCUMENTATION (OUTPATIENT)
Age: 58
End: 2023-12-18

## 2023-12-18 NOTE — PROGRESS NOTES
Psychotherapy Discharge Summary    Preferred Name: Agusto Riojas  YOB: 1965    Admission date to psychotherapy: 03/15/23    Referred by: Dr. Mcadams    Presenting Problem: Anxiety and depression due to a work place incident and his inability to work and provide for his family.  Ineffective communication skills with wife and family    Course of treatment included : individual therapy     Progress/Outcome of Treatment Goals (brief summary of course of treatment) Agusto was able to see that he was nitpicking with family members as he was not used to being home so much     Treatment Complications (if any): Medical issues    Treatment Progress: fair    Current [facility] Provider: None    Discharge Medications include: See chart    Discharge Date: 12/18/23    Discharge Diagnosis: No diagnosis found.    Criteria for Discharge:  Stopped coming to treatment    Aftercare recommendations include (include specific referral names and phone numbers, if appropriate): N/A    Prognosis: fair

## 2024-01-25 ENCOUNTER — TELEPHONE (OUTPATIENT)
Dept: NEPHROLOGY | Facility: CLINIC | Age: 59
End: 2024-01-25

## 2024-02-22 ENCOUNTER — OFFICE VISIT (OUTPATIENT)
Dept: INTERNAL MEDICINE CLINIC | Facility: CLINIC | Age: 59
End: 2024-02-22
Payer: COMMERCIAL

## 2024-02-22 VITALS
BODY MASS INDEX: 36.88 KG/M2 | HEIGHT: 73 IN | HEART RATE: 75 BPM | WEIGHT: 278.3 LBS | TEMPERATURE: 99.1 F | SYSTOLIC BLOOD PRESSURE: 128 MMHG | OXYGEN SATURATION: 99 % | RESPIRATION RATE: 18 BRPM | DIASTOLIC BLOOD PRESSURE: 96 MMHG

## 2024-02-22 DIAGNOSIS — N52.9 ERECTILE DYSFUNCTION, UNSPECIFIED ERECTILE DYSFUNCTION TYPE: ICD-10-CM

## 2024-02-22 DIAGNOSIS — K64.4 EXTERNAL HEMORRHOID: Primary | ICD-10-CM

## 2024-02-22 DIAGNOSIS — F32.9 MAJOR DEPRESSIVE DISORDER WITH SINGLE EPISODE, REMISSION STATUS UNSPECIFIED: ICD-10-CM

## 2024-02-22 DIAGNOSIS — I10 ESSENTIAL HYPERTENSION: ICD-10-CM

## 2024-02-22 DIAGNOSIS — F32.1 CURRENT MODERATE EPISODE OF MAJOR DEPRESSIVE DISORDER WITHOUT PRIOR EPISODE (HCC): ICD-10-CM

## 2024-02-22 PROCEDURE — 99214 OFFICE O/P EST MOD 30 MIN: CPT | Performed by: INTERNAL MEDICINE

## 2024-02-22 RX ORDER — BUPROPION HYDROCHLORIDE 150 MG/1
150 TABLET ORAL EVERY MORNING
Qty: 90 TABLET | Refills: 2 | Status: SHIPPED | OUTPATIENT
Start: 2024-02-22

## 2024-02-22 RX ORDER — AMLODIPINE AND BENAZEPRIL HYDROCHLORIDE 10; 40 MG/1; MG/1
1 CAPSULE ORAL DAILY
Qty: 90 CAPSULE | Refills: 3 | Status: SHIPPED | OUTPATIENT
Start: 2024-02-22

## 2024-02-22 RX ORDER — TADALAFIL 5 MG/1
5 TABLET ORAL DAILY PRN
Status: SHIPPED
Start: 2024-02-22

## 2024-02-22 RX ORDER — CLONIDINE 0.3 MG/24H
1 PATCH, EXTENDED RELEASE TRANSDERMAL WEEKLY
Qty: 12 PATCH | Refills: 3 | Status: SHIPPED | OUTPATIENT
Start: 2024-02-22 | End: 2024-05-22

## 2024-02-22 NOTE — PROGRESS NOTES
Assessment/Plan:     HTN controlled, continue amlodipine-benazepril, and clonidine.    External hemorrhoids, rectal ointment ordered, see colorectal surgery.    Continue follow up with nephrology, avoid NSAID's;     Still working on getting insurance;     Quality Measures:       Depression Screening and Follow-up Plan: Patient was screened for depression during today's encounter. They screened negative with a PHQ-9 score of 4.         Return in about 4 months (around 6/22/2024).    No problem-specific Assessment & Plan notes found for this encounter.       Diagnoses and all orders for this visit:    External hemorrhoid  -     Ambulatory Referral to Colorectal Surgery; Future  -     NIFEdipine 0.3%-lidocaine 5% rectal ointment; Apply 1 Application topically every 4 (four) hours as needed for discomfort or pain    Essential hypertension  -     cloNIDine (CATAPRES-TTS-3) 0.3 mg/24 hr; Place 1 patch (0.3 mg total) on the skin over 7 days once a week  -     amLODIPine-benazepril (LOTREL) 10-40 MG per capsule; Take 1 capsule by mouth daily    Current moderate episode of major depressive disorder without prior episode (HCC)    Erectile dysfunction, unspecified erectile dysfunction type  -     tadalafil (CIALIS) 5 MG tablet; Take 1 tablet (5 mg total) by mouth daily as needed for erectile dysfunction PRN    Major depressive disorder with single episode, remission status unspecified  -     buPROPion (WELLBUTRIN XL) 150 mg 24 hr tablet; Take 1 tablet (150 mg total) by mouth every morning          Subjective:      Patient ID: Agusto Riojas is a 59 y.o. male.    Patient is here for routine follow up, reviewed chronic medical problems, ordered labs for next visit including CBC CMP TSH A1C LIPID. Reviewed labs for this visit.        ALLERGIES:  No Known Allergies    CURRENT MEDICATIONS:    Current Outpatient Medications:     amLODIPine-benazepril (LOTREL) 10-40 MG per capsule, Take 1 capsule by mouth daily, Disp: 90 capsule,  Rfl: 3    buPROPion (WELLBUTRIN XL) 150 mg 24 hr tablet, Take 1 tablet (150 mg total) by mouth every morning, Disp: 90 tablet, Rfl: 2    cloNIDine (CATAPRES-TTS-3) 0.3 mg/24 hr, Place 1 patch (0.3 mg total) on the skin over 7 days once a week, Disp: 12 patch, Rfl: 3    multivitamin (THERAGRAN) TABS, Take 1 tablet by mouth daily, Disp: , Rfl:     NIFEdipine 0.3%-lidocaine 5% rectal ointment, Apply 1 Application topically every 4 (four) hours as needed for discomfort or pain, Disp: 10 g, Rfl: 0    Omega-3 Fatty Acids (fish oil) 1,000 mg, Take 1,000 mg by mouth daily, Disp: , Rfl:     tadalafil (CIALIS) 5 MG tablet, Take 1 tablet (5 mg total) by mouth daily as needed for erectile dysfunction PRN, Disp: , Rfl:     ACTIVE PROBLEM LIST:  Patient Active Problem List   Diagnosis    Stage 3a chronic kidney disease (HCC)    Essential hypertension    Chronic kidney disease-mineral and bone disorder    History of colonic polyps    Current moderate episode of major depressive disorder without prior episode (HCC)    Enlarged thyroid       PAST MEDICAL HISTORY:  Past Medical History:   Diagnosis Date    Chronic kidney disease     Colon polyp     Depression     Epididymitis 8/18/22    Hypertension        PAST SURGICAL HISTORY:  Past Surgical History:   Procedure Laterality Date    COLONOSCOPY      COLONOSCOPY      SHOULDER SURGERY Left 10/2022    US GUIDED THYROID BIOPSY  11/14/2023    WISDOM TOOTH EXTRACTION         FAMILY HISTORY:  Family History   Problem Relation Age of Onset    Hypertension Mother     Arthritis Mother     No Known Problems Father     Pancreatic cancer Sister     Other Sister         AIDS    Pancreatic cancer Brother     No Known Problems Brother     No Known Problems Brother        SOCIAL HISTORY:  Social History     Socioeconomic History    Marital status: /Civil Union     Spouse name: Not on file    Number of children: Not on file    Years of education: Not on file    Highest education level: Not on  file   Occupational History    Not on file   Tobacco Use    Smoking status: Never     Passive exposure: Never    Smokeless tobacco: Never   Vaping Use    Vaping status: Never Used   Substance and Sexual Activity    Alcohol use: Yes     Alcohol/week: 1.0 standard drink of alcohol     Types: 1 Standard drinks or equivalent per week     Comment: Once a week    Drug use: Never    Sexual activity: Yes     Partners: Female   Other Topics Concern    Not on file   Social History Narrative    Not on file     Social Determinants of Health     Financial Resource Strain: Low Risk  (12/3/2023)    Received from [facility]    Overall Financial Resource Strain (CARDIA)     Difficulty of Paying Living Expenses: Not hard at all   Food Insecurity: No Food Insecurity (12/3/2023)    Received from [facility]    Hunger Vital Sign     Worried About Running Out of Food in the Last Year: Never true     Ran Out of Food in the Last Year: Never true   Transportation Needs: No Transportation Needs (12/3/2023)    Received from [facility]    PRAPARE - Transportation     Lack of Transportation (Medical): No     Lack of Transportation (Non-Medical): No   Physical Activity: Not on file   Stress: Not on file   Social Connections: Not on file   Intimate Partner Violence: Not At Risk (12/3/2023)    Received from [facility]    Humiliation, Afraid, Rape, and Kick questionnaire     Fear of Current or Ex-Partner: No     Emotionally Abused: No     Physically Abused: No     Sexually Abused: No   Housing Stability: Low Risk  (12/3/2023)    Received from [facility]    Housing Stability Vital Sign     Unable to Pay for Housing in the Last Year: No     Number of Places Lived in the Last Year: 1     Unstable Housing in the Last Year: No       Review of Systems   Constitutional:  Negative for chills and fever.   HENT:  Negative for ear pain and sore throat.    Eyes:  Negative  "for pain and visual disturbance.   Respiratory:  Negative for cough and shortness of breath.    Cardiovascular:  Negative for chest pain and palpitations.   Gastrointestinal:  Positive for rectal pain. Negative for abdominal pain and vomiting.   Genitourinary:  Negative for dysuria and hematuria.   Musculoskeletal:  Negative for arthralgias and back pain.   Skin:  Negative for color change and rash.   Neurological:  Negative for seizures and syncope.   All other systems reviewed and are negative.        Objective:  Vitals:    02/22/24 1016 02/22/24 1034   BP: 138/80 128/96   BP Location: Right arm    Patient Position: Sitting    Cuff Size: Standard    Pulse: 75    Resp: 18    Temp: 99.1 °F (37.3 °C)    TempSrc: Tympanic    SpO2: 99%    Weight: 126 kg (278 lb 4.8 oz)    Height: 6' 1\" (1.854 m)      Body mass index is 36.72 kg/m².     Physical Exam  Vitals and nursing note reviewed.   Constitutional:       Appearance: Normal appearance. He is obese.   HENT:      Head: Normocephalic and atraumatic.   Cardiovascular:      Rate and Rhythm: Normal rate and regular rhythm.      Heart sounds: Normal heart sounds.   Pulmonary:      Effort: Pulmonary effort is normal.      Breath sounds: Normal breath sounds.   Musculoskeletal:         General: Normal range of motion.      Cervical back: Normal range of motion.   Skin:     General: Skin is warm and dry.   Neurological:      General: No focal deficit present.      Mental Status: He is alert and oriented to person, place, and time. Mental status is at baseline.   Psychiatric:         Mood and Affect: Mood normal.           RESULTS:  Hemoglobin A1C   Date/Time Value Ref Range Status   07/20/2023 11:40 AM 6.3 (H) Normal 3.8-5.6%; PreDiabetic 5.7-6.4%; Diabetic >=6.5%; Glycemic control for adults with diabetes <7.0% % Final     Cholesterol   Date/Time Value Ref Range Status   05/23/2023 10:54  See Comment mg/dL Final     Comment:     Cholesterol:         Pediatric <18 " Years        Desirable          <170 mg/dL      Borderline High    170-199 mg/dL      High               >=200 mg/dL        Adult >=18 Years            Desirable        <200 mg/dL      Borderline High  200-239 mg/dL      High             >239 mg/dL       Triglycerides   Date/Time Value Ref Range Status   05/23/2023 10:54 AM 85 See Comment mg/dL Final     Comment:     Triglyceride:     0-9Y            <75mg/dL     10Y-17Y         <90 mg/dL       >=18Y     Normal          <150 mg/dL     Borderline High 150-199 mg/dL     High            200-499 mg/dL        Very High       >499 mg/dL    Specimen collection should occur prior to N-Acetylcysteine or Metamizole administration due to the potential for falsely depressed results.     HDL, Direct   Date/Time Value Ref Range Status   05/23/2023 10:54 AM 46 >=40 mg/dL Final     LDL Calculated   Date/Time Value Ref Range Status   05/23/2023 10:54  (H) 0 - 100 mg/dL Final     Comment:     LDL Cholesterol:     Optimal           <100 mg/dl     Near Optimal      100-129 mg/dl     Above Optimal       Borderline High 130-159 mg/dl       High            160-189 mg/dl       Very High       >189 mg/dl         This screening LDL is a calculated result.   It does not have the accuracy of the Direct Measured LDL in the monitoring of patients with hyperlipidemia and/or statin therapy.   Direct Measure LDL (VPP711) must be ordered separately in these patients.     Hemoglobin   Date/Time Value Ref Range Status   05/23/2023 10:54 AM 15.0 12.0 - 17.0 g/dL Final     Hematocrit   Date/Time Value Ref Range Status   05/23/2023 10:54 AM 46.9 36.5 - 49.3 % Final     Platelets   Date/Time Value Ref Range Status   05/23/2023 10:54  149 - 390 Thousands/uL Final     Prostate Specific Antigen Total   Date/Time Value Ref Range Status   05/28/2022 09:08 AM 1.9 0.0 - 4.0 ng/mL Final     Comment:     Roche ECLIA methodology.  According to the American Urological Association, Serum PSA  should  decrease and remain at undetectable levels after radical  prostatectomy. The AUA defines biochemical recurrence as an initial  PSA value 0.2 ng/mL or greater followed by a subsequent confirmatory  PSA value 0.2 ng/mL or greater.  Values obtained with different assay methods or kits cannot be used  interchangeably. Results cannot be interpreted as absolute evidence  of the presence or absence of malignant disease.     TSH 3RD GENERATON   Date/Time Value Ref Range Status   05/23/2023 10:54 AM 0.832 0.450 - 4.500 uIU/mL Final     Comment:     Adult TSH (3rd generation) reference range follows the recommended guidelines of the American Thyroid Association, January, 2020.     Sodium   Date/Time Value Ref Range Status   12/03/2023 10:22  135 - 145 mmol/L Final     BUN   Date/Time Value Ref Range Status   12/03/2023 10:22 AM 16 7 - 28 mg/dL Final     Creatinine   Date/Time Value Ref Range Status   12/03/2023 10:22 AM 1.38 (H) 0.53 - 1.30 mg/dL Final      In chart    This note was created with voice recognition software.  Phonic, grammatical and spelling errors may be present within the note as a result.

## 2024-02-23 ENCOUNTER — OFFICE VISIT (OUTPATIENT)
Age: 59
End: 2024-02-23
Payer: COMMERCIAL

## 2024-02-23 VITALS
SYSTOLIC BLOOD PRESSURE: 143 MMHG | HEART RATE: 83 BPM | WEIGHT: 278 LBS | BODY MASS INDEX: 35.68 KG/M2 | DIASTOLIC BLOOD PRESSURE: 87 MMHG | HEIGHT: 74 IN | OXYGEN SATURATION: 94 %

## 2024-02-23 DIAGNOSIS — K64.5 HEMORRHOID THROMBOSIS: Primary | ICD-10-CM

## 2024-02-23 DIAGNOSIS — E04.9 ENLARGED THYROID: ICD-10-CM

## 2024-02-23 DIAGNOSIS — N18.31 STAGE 3A CHRONIC KIDNEY DISEASE (HCC): ICD-10-CM

## 2024-02-23 DIAGNOSIS — K64.4 EXTERNAL HEMORRHOID: ICD-10-CM

## 2024-02-23 DIAGNOSIS — K62.89 ANAL PAIN: ICD-10-CM

## 2024-02-23 PROCEDURE — 99202 OFFICE O/P NEW SF 15 MIN: CPT | Performed by: COLON & RECTAL SURGERY

## 2024-02-23 RX ORDER — TRAMADOL HYDROCHLORIDE 50 MG/1
50 TABLET ORAL EVERY 6 HOURS PRN
Qty: 20 TABLET | Refills: 0 | Status: SHIPPED | OUTPATIENT
Start: 2024-02-23

## 2024-02-23 NOTE — PROGRESS NOTES
"Assessment/Plan:  Thrombosed right lateral external hemorrhoid.    Plan:    Conservative therapy thrombosed external hemorrhoid    Analgesics prescribed.  Local wound care reviewed.  Patient will follow-up as needed       Diagnoses and all orders for this visit:    Hemorrhoid thrombosis  -     traMADol (Ultram) 50 mg tablet; Take 1 tablet (50 mg total) by mouth every 6 (six) hours as needed for moderate pain for up to 20 doses    External hemorrhoid  -     Ambulatory Referral to Colorectal Surgery  -     traMADol (Ultram) 50 mg tablet; Take 1 tablet (50 mg total) by mouth every 6 (six) hours as needed for moderate pain for up to 20 doses    Anal pain  -     traMADol (Ultram) 50 mg tablet; Take 1 tablet (50 mg total) by mouth every 6 (six) hours as needed for moderate pain for up to 20 doses    Enlarged thyroid    Stage 3a chronic kidney disease (HCC)          Subjective:      Patient ID: Agusto Riojas is a 59 y.o. male.    HPI  Patient with 3-day history of thrombosed external hemorrhoid with anal pain.  Patient presents today for evaluation and treatment  The following portions of the patient's history were reviewed and updated as appropriate: allergies, current medications, past family history, past medical history, past social history, past surgical history, and problem list.    Review of Systems      Objective:      /87   Pulse 83   Ht 6' 2\" (1.88 m)   Wt 126 kg (278 lb)   SpO2 94%   BMI 35.69 kg/m²          Colorectal Physical Exam    Thrombosed right lateral 2 cm external hemorrhoid without cellulitis or infection or gangrenous change    "

## 2024-03-10 ENCOUNTER — HOSPITAL ENCOUNTER (EMERGENCY)
Facility: HOSPITAL | Age: 59
Discharge: HOME | End: 2024-03-11
Attending: EMERGENCY MEDICINE
Payer: COMMERCIAL

## 2024-03-10 ENCOUNTER — APPOINTMENT (EMERGENCY)
Dept: RADIOLOGY | Facility: HOSPITAL | Age: 59
End: 2024-03-10
Payer: COMMERCIAL

## 2024-03-10 DIAGNOSIS — J11.1 INFLUENZA: Primary | ICD-10-CM

## 2024-03-10 DIAGNOSIS — R79.89 ELEVATED SERUM CREATININE: ICD-10-CM

## 2024-03-10 LAB
2HR DELTA HS TROPONIN: -1 NG/L
ALBUMIN SERPL BCP-MCNC: 4.2 G/DL (ref 3.5–5)
ALP SERPL-CCNC: 68 U/L (ref 34–104)
ALT SERPL W P-5'-P-CCNC: 50 U/L (ref 7–52)
ANION GAP SERPL CALCULATED.3IONS-SCNC: 7 MMOL/L
APTT PPP: 34 SECONDS (ref 23–37)
AST SERPL W P-5'-P-CCNC: 41 U/L (ref 13–39)
ATRIAL RATE: 85 BPM
BASOPHILS # BLD AUTO: 0.03 THOUSANDS/ÂΜL (ref 0–0.1)
BASOPHILS NFR BLD AUTO: 1 % (ref 0–1)
BILIRUB SERPL-MCNC: 0.59 MG/DL (ref 0.2–1)
BUN SERPL-MCNC: 13 MG/DL (ref 5–25)
CALCIUM SERPL-MCNC: 9 MG/DL (ref 8.4–10.2)
CARDIAC TROPONIN I PNL SERPL HS: 10 NG/L
CARDIAC TROPONIN I PNL SERPL HS: 9 NG/L
CHLORIDE SERPL-SCNC: 103 MMOL/L (ref 96–108)
CO2 SERPL-SCNC: 27 MMOL/L (ref 21–32)
CREAT SERPL-MCNC: 1.63 MG/DL (ref 0.6–1.3)
EOSINOPHIL # BLD AUTO: 0.07 THOUSAND/ÂΜL (ref 0–0.61)
EOSINOPHIL NFR BLD AUTO: 2 % (ref 0–6)
ERYTHROCYTE [DISTWIDTH] IN BLOOD BY AUTOMATED COUNT: 14.2 % (ref 11.6–15.1)
FLUAV RNA RESP QL NAA+PROBE: POSITIVE
FLUBV RNA RESP QL NAA+PROBE: NEGATIVE
GFR SERPL CREATININE-BSD FRML MDRD: 44 ML/MIN/1.73SQ M
GLUCOSE SERPL-MCNC: 93 MG/DL (ref 65–140)
HCT VFR BLD AUTO: 42.1 % (ref 36.5–49.3)
HGB BLD-MCNC: 13.6 G/DL (ref 12–17)
IMM GRANULOCYTES # BLD AUTO: 0.02 THOUSAND/UL (ref 0–0.2)
IMM GRANULOCYTES NFR BLD AUTO: 0 % (ref 0–2)
INR PPP: 1.09 (ref 0.84–1.19)
LACTATE SERPL-SCNC: 0.9 MMOL/L (ref 0.5–2)
LYMPHOCYTES # BLD AUTO: 0.83 THOUSANDS/ÂΜL (ref 0.6–4.47)
LYMPHOCYTES NFR BLD AUTO: 18 % (ref 14–44)
MCH RBC QN AUTO: 29.8 PG (ref 26.8–34.3)
MCHC RBC AUTO-ENTMCNC: 32.3 G/DL (ref 31.4–37.4)
MCV RBC AUTO: 92 FL (ref 82–98)
MONOCYTES # BLD AUTO: 1.01 THOUSAND/ÂΜL (ref 0.17–1.22)
MONOCYTES NFR BLD AUTO: 21 % (ref 4–12)
NEUTROPHILS # BLD AUTO: 2.75 THOUSANDS/ÂΜL (ref 1.85–7.62)
NEUTS SEG NFR BLD AUTO: 58 % (ref 43–75)
NRBC BLD AUTO-RTO: 0 /100 WBCS
P AXIS: 75 DEGREES
PLATELET # BLD AUTO: 188 THOUSANDS/UL (ref 149–390)
PMV BLD AUTO: 10.7 FL (ref 8.9–12.7)
POTASSIUM SERPL-SCNC: 4.2 MMOL/L (ref 3.5–5.3)
PR INTERVAL: 158 MS
PROCALCITONIN SERPL-MCNC: 0.47 NG/ML
PROT SERPL-MCNC: 6.9 G/DL (ref 6.4–8.4)
PROTHROMBIN TIME: 14.8 SECONDS (ref 11.6–14.5)
QRS AXIS: 62 DEGREES
QRSD INTERVAL: 78 MS
QT INTERVAL: 314 MS
QTC INTERVAL: 373 MS
RBC # BLD AUTO: 4.56 MILLION/UL (ref 3.88–5.62)
RSV RNA RESP QL NAA+PROBE: NEGATIVE
SARS-COV-2 RNA RESP QL NAA+PROBE: NEGATIVE
SODIUM SERPL-SCNC: 137 MMOL/L (ref 135–147)
T WAVE AXIS: 50 DEGREES
VENTRICULAR RATE: 85 BPM
WBC # BLD AUTO: 4.71 THOUSAND/UL (ref 4.31–10.16)

## 2024-03-10 PROCEDURE — 85610 PROTHROMBIN TIME: CPT | Performed by: EMERGENCY MEDICINE

## 2024-03-10 PROCEDURE — 85025 COMPLETE CBC W/AUTO DIFF WBC: CPT | Performed by: EMERGENCY MEDICINE

## 2024-03-10 PROCEDURE — 36415 COLL VENOUS BLD VENIPUNCTURE: CPT | Performed by: EMERGENCY MEDICINE

## 2024-03-10 PROCEDURE — 71046 X-RAY EXAM CHEST 2 VIEWS: CPT

## 2024-03-10 PROCEDURE — 87040 BLOOD CULTURE FOR BACTERIA: CPT | Performed by: EMERGENCY MEDICINE

## 2024-03-10 PROCEDURE — 0241U HB NFCT DS VIR RESP RNA 4 TRGT: CPT | Performed by: EMERGENCY MEDICINE

## 2024-03-10 PROCEDURE — 84484 ASSAY OF TROPONIN QUANT: CPT | Performed by: EMERGENCY MEDICINE

## 2024-03-10 PROCEDURE — 85730 THROMBOPLASTIN TIME PARTIAL: CPT | Performed by: EMERGENCY MEDICINE

## 2024-03-10 PROCEDURE — 99284 EMERGENCY DEPT VISIT MOD MDM: CPT | Performed by: EMERGENCY MEDICINE

## 2024-03-10 PROCEDURE — 93005 ELECTROCARDIOGRAM TRACING: CPT

## 2024-03-10 PROCEDURE — 80053 COMPREHEN METABOLIC PANEL: CPT | Performed by: EMERGENCY MEDICINE

## 2024-03-10 PROCEDURE — 83605 ASSAY OF LACTIC ACID: CPT | Performed by: EMERGENCY MEDICINE

## 2024-03-10 PROCEDURE — 96365 THER/PROPH/DIAG IV INF INIT: CPT

## 2024-03-10 PROCEDURE — 93010 ELECTROCARDIOGRAM REPORT: CPT | Performed by: INTERNAL MEDICINE

## 2024-03-10 PROCEDURE — 99284 EMERGENCY DEPT VISIT MOD MDM: CPT

## 2024-03-10 PROCEDURE — 84145 PROCALCITONIN (PCT): CPT | Performed by: EMERGENCY MEDICINE

## 2024-03-10 RX ORDER — ACETAMINOPHEN 10 MG/ML
1000 INJECTION, SOLUTION INTRAVENOUS ONCE
Status: COMPLETED | OUTPATIENT
Start: 2024-03-10 | End: 2024-03-10

## 2024-03-10 RX ADMIN — SODIUM CHLORIDE 1000 ML: 0.9 INJECTION, SOLUTION INTRAVENOUS at 21:42

## 2024-03-10 RX ADMIN — ACETAMINOPHEN 1000 MG: 10 INJECTION INTRAVENOUS at 21:42

## 2024-03-11 VITALS
HEART RATE: 75 BPM | WEIGHT: 265 LBS | BODY MASS INDEX: 35.12 KG/M2 | DIASTOLIC BLOOD PRESSURE: 84 MMHG | HEIGHT: 73 IN | TEMPERATURE: 100.1 F | RESPIRATION RATE: 18 BRPM | SYSTOLIC BLOOD PRESSURE: 130 MMHG | OXYGEN SATURATION: 95 %

## 2024-03-11 NOTE — ED PROVIDER NOTES
"History  Chief Complaint   Patient presents with    Cough     Pt reports starting with a cough Saturday. Pt reports fevers and feeling dizzy. Pt hasn't checked fevers, but has been sweating. Pt took advil cold and sinus this morning. Pt reporting shortness of breath as well     59-year-old male presents to the emergency room with multiple symptoms that started last Thursday with coughing.      Patient states the symptoms worsened through Saturday when he started sweating and \"breathing heavy.\"  Patient was driving today and noted that he felt as though he \"blacked out\" multiple times while coughing.  Patient describes this as his dizziness though he denies any dizziness at present, he notes at least 2 episodes of syncope following coughing.    Patient states currently he feels \"really tired.\"  Patient notes some shortness of breath and chest pain earlier in the day though this resolved after he used Vicks and denies any pain at present.  Patient states he had had a headache earlier in the day that also improved.    Patient noted a fever today and took Advil sinus and cold around 0700 hrs. but has not taken any antipyretics since that time.  Patient is febrile upon arrival to the emergency room.    Patient denies any nausea or vomiting.  Patient denies any diarrhea.  Patient denies any abdominal pain.  Patient denies any rashes.    Impression and plan: Multiple symptoms with a broad differential but based on patient's history and physical, clinically most consistent with infectious etiology and considering associated symptoms, likely viral in nature and potentially COVID or influenza.  PCR testing was sent upon initial arrival.  Considering associated syncopal episodes that appear to be cough induced based on history, will obtain cardiac evaluation for alternative etiologies.  Patient does meet SIRS criteria so we will send sepsis evaluation though history more consistent with viral process.  Will treat patient " symptomatically and monitor patient on cardiac monitoring.  Will reassess.        Prior to Admission Medications   Prescriptions Last Dose Informant Patient Reported? Taking?   NIFEdipine 0.3%-lidocaine 5% rectal ointment  Self No No   Sig: Apply 1 Application topically every 4 (four) hours as needed for discomfort or pain   Omega-3 Fatty Acids (fish oil) 1,000 mg  Self Yes No   Sig: Take 1,000 mg by mouth daily   amLODIPine-benazepril (LOTREL) 10-40 MG per capsule  Self No No   Sig: Take 1 capsule by mouth daily   buPROPion (WELLBUTRIN XL) 150 mg 24 hr tablet  Self No No   Sig: Take 1 tablet (150 mg total) by mouth every morning   cloNIDine (CATAPRES-TTS-3) 0.3 mg/24 hr  Self No No   Sig: Place 1 patch (0.3 mg total) on the skin over 7 days once a week   multivitamin (THERAGRAN) TABS  Self Yes No   Sig: Take 1 tablet by mouth daily   tadalafil (CIALIS) 5 MG tablet  Self No No   Sig: Take 1 tablet (5 mg total) by mouth daily as needed for erectile dysfunction PRN   traMADol (Ultram) 50 mg tablet   No No   Sig: Take 1 tablet (50 mg total) by mouth every 6 (six) hours as needed for moderate pain for up to 20 doses      Facility-Administered Medications: None       Past Medical History:   Diagnosis Date    Chronic kidney disease     Colon polyp     Depression     Epididymitis 8/18/22    Hypertension        Past Surgical History:   Procedure Laterality Date    COLONOSCOPY      COLONOSCOPY      SHOULDER SURGERY Left 10/2022    US GUIDED THYROID BIOPSY  11/14/2023    WISDOM TOOTH EXTRACTION         Family History   Problem Relation Age of Onset    Hypertension Mother     Arthritis Mother     No Known Problems Father     Pancreatic cancer Sister     Other Sister         AIDS    Pancreatic cancer Brother     No Known Problems Brother     No Known Problems Brother      I have reviewed and agree with the history as documented.    E-Cigarette/Vaping    E-Cigarette Use Never User      E-Cigarette/Vaping Substances    Nicotine  No     THC No     CBD No     Flavoring No     Other No     Unknown No      Social History     Tobacco Use    Smoking status: Never     Passive exposure: Never    Smokeless tobacco: Never   Vaping Use    Vaping status: Never Used   Substance Use Topics    Alcohol use: Yes     Alcohol/week: 1.0 standard drink of alcohol     Types: 1 Standard drinks or equivalent per week     Comment: Once a week    Drug use: Never       Review of Systems    Physical Exam  Physical Exam  Vitals reviewed.   HENT:      Head: Normocephalic and atraumatic.   Eyes:      Conjunctiva/sclera: Conjunctivae normal.      Pupils: Pupils are equal, round, and reactive to light.   Cardiovascular:      Rate and Rhythm: Normal rate and regular rhythm.   Pulmonary:      Effort: Pulmonary effort is normal. No respiratory distress.      Breath sounds: Normal breath sounds. No wheezing, rhonchi or rales.   Abdominal:      General: There is no distension.      Tenderness: There is no abdominal tenderness. There is no guarding or rebound.   Musculoskeletal:         General: No deformity.      Cervical back: Neck supple.   Skin:     General: Skin is warm and dry.   Neurological:      General: No focal deficit present.      Mental Status: He is alert and oriented to person, place, and time.      Cranial Nerves: No cranial nerve deficit.      Sensory: No sensory deficit.      Motor: No weakness.      Coordination: Coordination normal.      Gait: Gait normal.      Comments: Able to maintain against gravity for 10 seconds and 5 seconds respectively with upper and lower.         Vital Signs  ED Triage Vitals   Temperature Pulse Respirations Blood Pressure SpO2   03/10/24 2105 03/10/24 2105 03/10/24 2105 03/10/24 2105 03/10/24 2105   (!) 102.5 °F (39.2 °C) 96 18 164/72 94 %      Temp Source Heart Rate Source Patient Position - Orthostatic VS BP Location FiO2 (%)   03/10/24 2105 03/10/24 2105 03/10/24 2105 03/10/24 2105 --   Oral Monitor Sitting Left arm       Pain  Score       03/10/24 2107       10 - Worst Possible Pain           Vitals:    03/10/24 2105 03/10/24 2254 03/11/24 0004   BP: 164/72 126/65 130/84   Pulse: 96 86 75   Patient Position - Orthostatic VS: Sitting Sitting Sitting         Visual Acuity      ED Medications  Medications   acetaminophen (Ofirmev) injection 1,000 mg (0 mg Intravenous Stopped 3/10/24 2301)   sodium chloride 0.9 % bolus 1,000 mL (0 mL Intravenous Stopped 3/10/24 2301)       Diagnostic Studies  Results Reviewed       Procedure Component Value Units Date/Time    Blood culture #1 [[id number]] Collected: 03/10/24 2144    Lab Status: Preliminary result Specimen: Blood from Arm, Left Updated: 03/12/24 0801     Blood Culture No Growth at 24 hrs.    Blood culture #2 [[id number]] Collected: 03/10/24 2141    Lab Status: Preliminary result Specimen: Blood from Arm, Right Updated: 03/12/24 0801     Blood Culture No Growth at 24 hrs.    HS Troponin I 2hr [[id number]]  (Normal) Collected: 03/10/24 2327    Lab Status: Final result Specimen: Blood from Arm, Left Updated: 03/10/24 2353     hs TnI 2hr 9 ng/L      Delta 2hr hsTnI -1 ng/L     Lactic acid [[id number]]  (Normal) Collected: 03/10/24 2141    Lab Status: Final result Specimen: Blood from Arm, Right Updated: 03/10/24 2231     LACTIC ACID 0.9 mmol/L     Narrative:      Result may be elevated if tourniquet was used during collection.    Procalcitonin [[id number]]  (Abnormal) Collected: 03/10/24 2141    Lab Status: Final result Specimen: Blood from Arm, Right Updated: 03/10/24 2221     Procalcitonin 0.47 ng/ml     HS Troponin 0hr (reflex protocol) [[id number]]  (Normal) Collected: 03/10/24 2141    Lab Status: Final result Specimen: Blood from Arm, Right Updated: 03/10/24 2217     hs TnI 0hr 10 ng/L     Comprehensive metabolic panel [[id number]]  (Abnormal) Collected: 03/10/24 2141    Lab Status: Final result Specimen: Blood from Arm, Right Updated: 03/10/24 2214     Sodium 137 mmol/L      Potassium 4.2 mmol/L       Chloride 103 mmol/L      CO2 27 mmol/L      ANION GAP 7 mmol/L      BUN 13 mg/dL      Creatinine 1.63 mg/dL      Glucose 93 mg/dL      Calcium 9.0 mg/dL      AST 41 U/L      ALT 50 U/L      Alkaline Phosphatase 68 U/L      Total Protein 6.9 g/dL      Albumin 4.2 g/dL      Total Bilirubin 0.59 mg/dL      eGFR 44 ml/min/1.73sq m     Narrative:      National Kidney Disease Foundation guidelines for Chronic Kidney Disease (CKD):     Stage 1 with normal or high GFR (GFR > 90 mL/min/1.73 square meters)    Stage 2 Mild CKD (GFR = 60-89 mL/min/1.73 square meters)    Stage 3A Moderate CKD (GFR = 45-59 mL/min/1.73 square meters)    Stage 3B Moderate CKD (GFR = 30-44 mL/min/1.73 square meters)    Stage 4 Severe CKD (GFR = 15-29 mL/min/1.73 square meters)    Stage 5 End Stage CKD (GFR <15 mL/min/1.73 square meters)  Note: GFR calculation is accurate only with a steady state creatinine    Protime-INR [[id number]]  (Abnormal) Collected: 03/10/24 2141    Lab Status: Final result Specimen: Blood from Arm, Right Updated: 03/10/24 2212     Protime 14.8 seconds      INR 1.09    APTT [[id number]]  (Normal) Collected: 03/10/24 2141    Lab Status: Final result Specimen: Blood from Arm, Right Updated: 03/10/24 2212     PTT 34 seconds     FLU/RSV/COVID - if FLU/RSV clinically relevant [[id number]]  (Abnormal) Collected: 03/10/24 2111    Lab Status: Final result Specimen: Nares from Nose Updated: 03/10/24 2156     SARS-CoV-2 Negative     INFLUENZA A PCR Positive     INFLUENZA B PCR Negative     RSV PCR Negative    Narrative:      FOR PEDIATRIC PATIENTS - copy/paste COVID Guidelines URL to browser: [email or web address]    SARS-CoV-2 assay is a Nucleic Acid Amplification assay intended for the  qualitative detection of nucleic acid from SARS-CoV-2 in nasopharyngeal  swabs. Results are for the presumptive identification of SARS-CoV-2 RNA.    Positive results are indicative of infection  with SARS-CoV-2, the virus  causing COVID-19, but do not rule out bacterial infection or co-infection  with other viruses. Laboratories within the [place] and its  territories are required to report all positive results to the appropriate  public health authorities. Negative results do not preclude SARS-CoV-2  infection and should not be used as the sole basis for treatment or other  patient management decisions. Negative results must be combined with  clinical observations, patient history, and epidemiological information.  This test has not been FDA cleared or approved.    This test has been authorized by FDA under an Emergency Use Authorization  (EUA). This test is only authorized for the duration of time the  declaration that circumstances exist justifying the authorization of the  emergency use of an in vitro diagnostic tests for detection of SARS-CoV-2  virus and/or diagnosis of COVID-19 infection under section 564(b)(1) of  the Act, 21 U.S.C. 360bbb-3(b)(1), unless the authorization is terminated  or revoked sooner. The test has been validated but independent review by FDA  and CLIA is pending.    Test performed using [employer] Gene[employer]pert: This RT-PCR assay targets N2,  a region unique to SARS-CoV-2. A conserved region in the E-gene was chosen  for pan-Sarbecovirus detection which includes SARS-CoV-2.    According to CMS-2020-01-R, this platform meets the definition of high-throughput technology.    CBC and differential [[id number]]  (Abnormal) Collected: 03/10/24 2141    Lab Status: Final result Specimen: Blood from Arm, Right Updated: 03/10/24 2152     WBC 4.71 Thousand/uL      RBC 4.56 Million/uL      Hemoglobin 13.6 g/dL      Hematocrit 42.1 %      MCV 92 fL      MCH 29.8 pg      MCHC 32.3 g/dL      RDW 14.2 %      MPV 10.7 fL      Platelets 188 Thousands/uL      nRBC 0 /100 WBCs      Neutrophils Relative 58 %      Immature Grans % 0 %      Lymphocytes Relative 18 %      Monocytes Relative 21 %       Eosinophils Relative 2 %      Basophils Relative 1 %      Neutrophils Absolute 2.75 Thousands/µL      Absolute Immature Grans 0.02 Thousand/uL      Absolute Lymphocytes 0.83 Thousands/µL      Absolute Monocytes 1.01 Thousand/µL      Eosinophils Absolute 0.07 Thousand/µL      Basophils Absolute 0.03 Thousands/µL                    XR chest pa & lateral   Final Result by Nanette Adam MD (03/11 0837)      No acute cardiopulmonary disease.            Workstation performed: [id number]                    Procedures  Procedures         ED Course  ED Course as of 03/13/24 0238   Sun Mar 10, 2024   2248 EKG demonstrates normal sinus rhythm with no acute ST segment changes.   2255 Patient's influenza testing, likely the source of his symptoms.  Patient does have a mildly elevated procalcitonin but no clear infiltrate on chest imaging and symptoms more likely secondary to influenza.  Patient's symptoms started on Thursday and he denies any high risk factors.  Discussed risks and benefits of antivirals and after discussion, patient declined.  Discussed continued symptomatic management with the patient and close follow-up with primary care.  Discussed and emphasized return precautions including signs and symptoms of bacterial infection.  Discussed close follow-up, continued symptomatic management and return precautions.                               SBIRT 22yo+      Flowsheet Row Most Recent Value   Initial Alcohol Screen: US AUDIT-C     1. How often do you have a drink containing alcohol? 0 Filed at: 03/10/2024 2108   2. How many drinks containing alcohol do you have on a typical day you are drinking?  0 Filed at: 03/10/2024 2108   3a. Male UNDER 65: How often do you have five or more drinks on one occasion? 0 Filed at: 03/10/2024 2108   3b. FEMALE Any Age, or MALE 65+: How often do you have 4 or more drinks on one occassion? 0 Filed at: 03/10/2024 2108   Audit-C Score 0 Filed at: 03/10/2024 2108   SHINE: How many times  in the past year have you...    Used an illegal drug or used a prescription medication for non-medical reasons? Never Filed at: 03/10/2024 2108                      Medical Decision Making  Amount and/or Complexity of Data Reviewed  Labs: ordered.  Radiology: ordered.    Risk  Prescription drug management.             Disposition  Final diagnoses:   Influenza   Elevated serum creatinine     Time reflects when diagnosis was documented in both MDM as applicable and the Disposition within this note       Time User Action Codes Description Comment    3/10/2024 11:28 PM Nicholas Huang [J11.1] Influenza     3/10/2024 11:29 PM Nicholas Huang Add [R79.89] Elevated serum creatinine           ED Disposition       ED Disposition   Discharge    Condition   Stable    Date/Time   Sun Mar 10, 2024 6879    Comment   Agusto Welcome discharge to home/self care.                   Follow-up Information       Follow up With Specialties Details Why Contact Info Additional Information    Deon Mcadams MD Internal Medicine Schedule an appointment as soon as possible for a visit in 2 days Follow-up and monitoring.  Continued monitoring of your kidney dysfunction. [address]  [address]  [facility] [address]  [phone]       [facility] Emergency Department Emergency Medicine Go to  If symptoms worsen [address] [facility] [id number] [phone] [facility] Emergency Department, [address], [place], [address]            Discharge Medication List as of 3/11/2024 12:01 AM        CONTINUE these medications which have NOT CHANGED    Details   amLODIPine-benazepril (LOTREL) 10-40 MG per capsule Take 1 capsule by mouth daily, Starting Thu 2/22/2024, Normal      buPROPion (WELLBUTRIN XL) 150 mg 24 hr tablet Take 1 tablet (150 mg total) by mouth every morning, Starting Thu 2/22/2024, Normal      cloNIDine (CATAPRES-TTS-3) 0.3 mg/24 hr Place 1 patch (0.3 mg  total) on the skin over 7 days once a week, Starting Thu 2/22/2024, Until Wed 5/22/2024, Normal      multivitamin (THERAGRAN) TABS Take 1 tablet by mouth daily, Historical Med      NIFEdipine 0.3%-lidocaine 5% rectal ointment Apply 1 Application topically every 4 (four) hours as needed for discomfort or pain, Starting Thu 2/22/2024, Normal      Omega-3 Fatty Acids (fish oil) 1,000 mg Take 1,000 mg by mouth daily, Historical Med      tadalafil (CIALIS) 5 MG tablet Take 1 tablet (5 mg total) by mouth daily as needed for erectile dysfunction PRN, Starting Thu 2/22/2024, No Print      traMADol (Ultram) 50 mg tablet Take 1 tablet (50 mg total) by mouth every 6 (six) hours as needed for moderate pain for up to 20 doses, Starting Fri 2/23/2024, Normal             No discharge procedures on file.    PDMP Review         Value Time User    PDMP Reviewed  Yes 2/23/2024 12:29 PM Neeraj Jeffrey MD            ED Provider  Electronically Signed by             Nicholas Huang MD  03/13/24 0239

## 2024-03-15 ENCOUNTER — OFFICE VISIT (OUTPATIENT)
Dept: INTERNAL MEDICINE CLINIC | Facility: CLINIC | Age: 59
End: 2024-03-15
Payer: COMMERCIAL

## 2024-03-15 VITALS
DIASTOLIC BLOOD PRESSURE: 88 MMHG | HEART RATE: 73 BPM | SYSTOLIC BLOOD PRESSURE: 132 MMHG | RESPIRATION RATE: 18 BRPM | BODY MASS INDEX: 34.14 KG/M2 | HEIGHT: 73 IN | TEMPERATURE: 97.6 F | OXYGEN SATURATION: 97 % | WEIGHT: 257.6 LBS

## 2024-03-15 DIAGNOSIS — N18.31 STAGE 3A CHRONIC KIDNEY DISEASE (HCC): ICD-10-CM

## 2024-03-15 DIAGNOSIS — J10.1 INFLUENZA A: ICD-10-CM

## 2024-03-15 DIAGNOSIS — B37.0 THRUSH: Primary | ICD-10-CM

## 2024-03-15 PROCEDURE — 99213 OFFICE O/P EST LOW 20 MIN: CPT | Performed by: INTERNAL MEDICINE

## 2024-03-15 NOTE — PROGRESS NOTES
Assessment/Plan:     Patient is recovering from influenza A; still fatigued, lost weight; no need for ABT's, increase fluids;    Recheck BMP, elevated cr on CKD noted.     For thrush, nystatin sent.     Quality Measures:       Depression Screening and Follow-up Plan: Clincally patient does not have depression. No treatment is required.          Return in 12 days (on 3/27/2024).    No problem-specific Assessment & Plan notes found for this encounter.       Diagnoses and all orders for this visit:    Thrush  -     nystatin (MYCOSTATIN) 500,000 units/5 mL suspension; Apply 5 mL (500,000 Units total) to the mouth or throat 4 (four) times a day    Stage 3a chronic kidney disease (HCC)  -     Basic metabolic panel; Future    Influenza A          Subjective:      Patient ID: Agusto Riojas is a 59 y.o. male.    Here for er follow up.        ALLERGIES:  No Known Allergies    CURRENT MEDICATIONS:    Current Outpatient Medications:     amLODIPine-benazepril (LOTREL) 10-40 MG per capsule, Take 1 capsule by mouth daily, Disp: 90 capsule, Rfl: 3    buPROPion (WELLBUTRIN XL) 150 mg 24 hr tablet, Take 1 tablet (150 mg total) by mouth every morning, Disp: 90 tablet, Rfl: 2    cloNIDine (CATAPRES-TTS-3) 0.3 mg/24 hr, Place 1 patch (0.3 mg total) on the skin over 7 days once a week, Disp: 12 patch, Rfl: 3    multivitamin (THERAGRAN) TABS, Take 1 tablet by mouth daily, Disp: , Rfl:     NIFEdipine 0.3%-lidocaine 5% rectal ointment, Apply 1 Application topically every 4 (four) hours as needed for discomfort or pain, Disp: 10 g, Rfl: 0    nystatin (MYCOSTATIN) 500,000 units/5 mL suspension, Apply 5 mL (500,000 Units total) to the mouth or throat 4 (four) times a day, Disp: 60 mL, Rfl: 0    Omega-3 Fatty Acids (fish oil) 1,000 mg, Take 1,000 mg by mouth daily, Disp: , Rfl:     tadalafil (CIALIS) 5 MG tablet, Take 1 tablet (5 mg total) by mouth daily as needed for erectile dysfunction PRN, Disp: , Rfl:     traMADol (Ultram) 50 mg tablet,  Take 1 tablet (50 mg total) by mouth every 6 (six) hours as needed for moderate pain for up to 20 doses, Disp: 20 tablet, Rfl: 0    ACTIVE PROBLEM LIST:  Patient Active Problem List   Diagnosis    Stage 3a chronic kidney disease (HCC)    Essential hypertension    Chronic kidney disease-mineral and bone disorder    History of colonic polyps    Current moderate episode of major depressive disorder without prior episode (HCC)    Enlarged thyroid       PAST MEDICAL HISTORY:  Past Medical History:   Diagnosis Date    Chronic kidney disease     Colon polyp     Depression     Epididymitis 8/18/22    Hypertension        PAST SURGICAL HISTORY:  Past Surgical History:   Procedure Laterality Date    COLONOSCOPY      COLONOSCOPY      SHOULDER SURGERY Left 10/2022    US GUIDED THYROID BIOPSY  11/14/2023    WISDOM TOOTH EXTRACTION         FAMILY HISTORY:  Family History   Problem Relation Age of Onset    Hypertension Mother     Arthritis Mother     No Known Problems Father     Pancreatic cancer Sister     Other Sister         AIDS    Pancreatic cancer Brother     No Known Problems Brother     No Known Problems Brother        SOCIAL HISTORY:  Social History     Socioeconomic History    Marital status: /Civil Union     Spouse name: Not on file    Number of children: Not on file    Years of education: Not on file    Highest education level: Not on file   Occupational History    Not on file   Tobacco Use    Smoking status: Never     Passive exposure: Never    Smokeless tobacco: Never   Vaping Use    Vaping status: Never Used   Substance and Sexual Activity    Alcohol use: Yes     Alcohol/week: 1.0 standard drink of alcohol     Types: 1 Standard drinks or equivalent per week     Comment: Once a week    Drug use: Never    Sexual activity: Yes     Partners: Female   Other Topics Concern    Not on file   Social History Narrative    Not on file     Social Determinants of Health     Financial Resource Strain: Low Risk  (12/3/2023)     Received from [facility]    Overall Financial Resource Strain (CARDIA)     Difficulty of Paying Living Expenses: Not hard at all   Food Insecurity: No Food Insecurity (12/3/2023)    Received from [facility] [address] [facility]    Hunger Vital Sign     Worried About Running Out of Food in the Last Year: Never true     Ran Out of Food in the Last Year: Never true   Transportation Needs: No Transportation Needs (12/3/2023)    Received from [facility] [address] [facility]    PRAPARE - Transportation     Lack of Transportation (Medical): No     Lack of Transportation (Non-Medical): No   Physical Activity: Not on file   Stress: Not on file   Social Connections: Not on file   Intimate Partner Violence: Not At Risk (12/3/2023)    Received from [facility] [address]    Humiliation, Afraid, Rape, and Kick questionnaire     Fear of Current or Ex-Partner: No     Emotionally Abused: No     Physically Abused: No     Sexually Abused: No   Housing Stability: Low Risk  (12/3/2023)    Received from [facility]    Housing Stability Vital Sign     Unable to Pay for Housing in the Last Year: No     Number of Places Lived in the Last Year: 1     Unstable Housing in the Last Year: No       Review of Systems   Constitutional:  Negative for chills and fever.   HENT:  Negative for ear pain and sore throat.    Eyes:  Negative for pain and visual disturbance.   Respiratory:  Negative for cough and shortness of breath.    Cardiovascular:  Negative for chest pain and palpitations.   Gastrointestinal:  Negative for abdominal pain and vomiting.   Genitourinary:  Negative for dysuria and hematuria.   Musculoskeletal:  Negative for arthralgias and back pain.   Skin:  Negative for color change and rash.   Neurological:  Negative for seizures and syncope.   All other systems reviewed and are negative.        Objective:  Vitals:    03/15/24 0836   BP: 132/88   BP Location: Right arm   Patient Position: Sitting  "  Cuff Size: Standard   Pulse: 73   Resp: 18   Temp: 97.6 °F (36.4 °C)   TempSrc: Tympanic   SpO2: 97%   Weight: 117 kg (257 lb 9.6 oz)   Height: 6' 1\" (1.854 m)     Body mass index is 33.99 kg/m².     Physical Exam  Vitals and nursing note reviewed.   Constitutional:       Appearance: Normal appearance.      Comments: Tired appearing   HENT:      Head: Normocephalic and atraumatic.   Cardiovascular:      Rate and Rhythm: Normal rate.   Pulmonary:      Effort: Pulmonary effort is normal.      Breath sounds: Normal breath sounds.   Musculoskeletal:         General: Normal range of motion.      Cervical back: Normal range of motion.      Right lower leg: No edema.      Left lower leg: No edema.   Skin:     General: Skin is warm and dry.   Neurological:      General: No focal deficit present.      Mental Status: He is oriented to person, place, and time. Mental status is at baseline. He is lethargic.   Psychiatric:         Mood and Affect: Mood normal.         Behavior: Behavior is cooperative.           RESULTS:  Hemoglobin A1C   Date/Time Value Ref Range Status   07/20/2023 11:40 AM 6.3 (H) Normal 3.8-5.6%; PreDiabetic 5.7-6.4%; Diabetic >=6.5%; Glycemic control for adults with diabetes <7.0% % Final     Cholesterol   Date/Time Value Ref Range Status   05/23/2023 10:54  See Comment mg/dL Final     Comment:     Cholesterol:         Pediatric <18 Years        Desirable          <170 mg/dL      Borderline High    170-199 mg/dL      High               >=200 mg/dL        Adult >=18 Years            Desirable        <200 mg/dL      Borderline High  200-239 mg/dL      High             >239 mg/dL       Triglycerides   Date/Time Value Ref Range Status   05/23/2023 10:54 AM 85 See Comment mg/dL Final     Comment:     Triglyceride:     0-9Y            <75mg/dL     10Y-17Y         <90 mg/dL       >=18Y     Normal          <150 mg/dL     Borderline High 150-199 mg/dL     High            200-499 mg/dL        Very High       " >499 mg/dL    Specimen collection should occur prior to N-Acetylcysteine or Metamizole administration due to the potential for falsely depressed results.     HDL, Direct   Date/Time Value Ref Range Status   05/23/2023 10:54 AM 46 >=40 mg/dL Final     LDL Calculated   Date/Time Value Ref Range Status   05/23/2023 10:54  (H) 0 - 100 mg/dL Final     Comment:     LDL Cholesterol:     Optimal           <100 mg/dl     Near Optimal      100-129 mg/dl     Above Optimal       Borderline High 130-159 mg/dl       High            160-189 mg/dl       Very High       >189 mg/dl         This screening LDL is a calculated result.   It does not have the accuracy of the Direct Measured LDL in the monitoring of patients with hyperlipidemia and/or statin therapy.   Direct Measure LDL (DRD863) must be ordered separately in these patients.     Hemoglobin   Date/Time Value Ref Range Status   03/10/2024 09:41 PM 13.6 12.0 - 17.0 g/dL Final     Hematocrit   Date/Time Value Ref Range Status   03/10/2024 09:41 PM 42.1 36.5 - 49.3 % Final     Platelets   Date/Time Value Ref Range Status   03/10/2024 09:41  149 - 390 Thousands/uL Final     Prostate Specific Antigen Total   Date/Time Value Ref Range Status   05/28/2022 09:08 AM 1.9 0.0 - 4.0 ng/mL Final     Comment:     Roche ECLIA methodology.  According to the American Urological Association, Serum PSA should  decrease and remain at undetectable levels after radical  prostatectomy. The AUA defines biochemical recurrence as an initial  PSA value 0.2 ng/mL or greater followed by a subsequent confirmatory  PSA value 0.2 ng/mL or greater.  Values obtained with different assay methods or kits cannot be used  interchangeably. Results cannot be interpreted as absolute evidence  of the presence or absence of malignant disease.     TSH 3RD GENERATON   Date/Time Value Ref Range Status   05/23/2023 10:54 AM 0.832 0.450 - 4.500 uIU/mL Final     Comment:     Adult TSH (3rd generation) reference  range follows the recommended guidelines of the American Thyroid Association, January, 2020.     Sodium   Date/Time Value Ref Range Status   03/10/2024 09:41  135 - 147 mmol/L Final   12/03/2023 10:22  135 - 145 mmol/L Final     BUN   Date/Time Value Ref Range Status   03/10/2024 09:41 PM 13 5 - 25 mg/dL Final   12/03/2023 10:22 AM 16 7 - 28 mg/dL Final     Creatinine   Date/Time Value Ref Range Status   03/10/2024 09:41 PM 1.63 (H) 0.60 - 1.30 mg/dL Final     Comment:     Standardized to IDMS reference method   12/03/2023 10:22 AM 1.38 (H) 0.53 - 1.30 mg/dL Final      In chart    This note was created with voice recognition software.  Phonic, grammatical and spelling errors may be present within the note as a result.

## 2024-03-16 LAB
BACTERIA BLD CULT: NORMAL
BACTERIA BLD CULT: NORMAL

## 2024-03-25 ENCOUNTER — OFFICE VISIT (OUTPATIENT)
Dept: INTERNAL MEDICINE CLINIC | Facility: CLINIC | Age: 59
End: 2024-03-25
Payer: COMMERCIAL

## 2024-03-25 VITALS
OXYGEN SATURATION: 92 % | WEIGHT: 261 LBS | SYSTOLIC BLOOD PRESSURE: 122 MMHG | HEIGHT: 73 IN | DIASTOLIC BLOOD PRESSURE: 76 MMHG | HEART RATE: 67 BPM | BODY MASS INDEX: 34.59 KG/M2

## 2024-03-25 DIAGNOSIS — J10.1 INFLUENZA A: ICD-10-CM

## 2024-03-25 DIAGNOSIS — B37.0 THRUSH: Primary | ICD-10-CM

## 2024-03-25 PROCEDURE — 99213 OFFICE O/P EST LOW 20 MIN: CPT | Performed by: INTERNAL MEDICINE

## 2024-03-25 NOTE — PROGRESS NOTES
Assessment/Plan:     Patient is here for 2-week follow-up.  Doing well since the flu.  Still says that he has a cough but it is resolving.  Physical exam is unremarkable.  He has insurance again and he has his surgeries coming up.  Will be seeing each other again in June.  Thrush has resolved.     Quality Measures:       Depression Screening and Follow-up Plan: Clincally patient does not have depression. No treatment is required.          Return for Next scheduled follow up.    No problem-specific Assessment & Plan notes found for this encounter.       Diagnoses and all orders for this visit:    Thrush    Influenza A          Subjective:      Patient ID: Agusto Riojas is a 59 y.o. male.    Patient is here for 2-week follow-up.        ALLERGIES:  No Known Allergies    CURRENT MEDICATIONS:    Current Outpatient Medications:     amLODIPine-benazepril (LOTREL) 10-40 MG per capsule, Take 1 capsule by mouth daily, Disp: 90 capsule, Rfl: 3    buPROPion (WELLBUTRIN XL) 150 mg 24 hr tablet, Take 1 tablet (150 mg total) by mouth every morning, Disp: 90 tablet, Rfl: 2    cloNIDine (CATAPRES-TTS-3) 0.3 mg/24 hr, Place 1 patch (0.3 mg total) on the skin over 7 days once a week, Disp: 12 patch, Rfl: 3    multivitamin (THERAGRAN) TABS, Take 1 tablet by mouth daily, Disp: , Rfl:     NIFEdipine 0.3%-lidocaine 5% rectal ointment, Apply 1 Application topically every 4 (four) hours as needed for discomfort or pain, Disp: 10 g, Rfl: 0    Omega-3 Fatty Acids (fish oil) 1,000 mg, Take 1,000 mg by mouth daily, Disp: , Rfl:     tadalafil (CIALIS) 5 MG tablet, Take 1 tablet (5 mg total) by mouth daily as needed for erectile dysfunction PRN, Disp: , Rfl:     traMADol (Ultram) 50 mg tablet, Take 1 tablet (50 mg total) by mouth every 6 (six) hours as needed for moderate pain for up to 20 doses, Disp: 20 tablet, Rfl: 0    ACTIVE PROBLEM LIST:  Patient Active Problem List   Diagnosis    Stage 3a chronic kidney disease (HCC)    Essential  hypertension    Chronic kidney disease-mineral and bone disorder    History of colonic polyps    Current moderate episode of major depressive disorder without prior episode (HCC)    Enlarged thyroid       PAST MEDICAL HISTORY:  Past Medical History:   Diagnosis Date    Chronic kidney disease     Colon polyp     Depression     Epididymitis 8/18/22    Hypertension        PAST SURGICAL HISTORY:  Past Surgical History:   Procedure Laterality Date    COLONOSCOPY      COLONOSCOPY      SHOULDER SURGERY Left 10/2022    US GUIDED THYROID BIOPSY  11/14/2023    WISDOM TOOTH EXTRACTION         FAMILY HISTORY:  Family History   Problem Relation Age of Onset    Hypertension Mother     Arthritis Mother     No Known Problems Father     Pancreatic cancer Sister     Other Sister         AIDS    Pancreatic cancer Brother     No Known Problems Brother     No Known Problems Brother        SOCIAL HISTORY:  Social History     Socioeconomic History    Marital status: /Civil Union     Spouse name: Not on file    Number of children: Not on file    Years of education: Not on file    Highest education level: Not on file   Occupational History    Not on file   Tobacco Use    Smoking status: Never     Passive exposure: Never    Smokeless tobacco: Never   Vaping Use    Vaping status: Never Used   Substance and Sexual Activity    Alcohol use: Yes     Alcohol/week: 1.0 standard drink of alcohol     Types: 1 Standard drinks or equivalent per week     Comment: Once a week    Drug use: Never    Sexual activity: Yes     Partners: Female   Other Topics Concern    Not on file   Social History Narrative    Not on file     Social Determinants of Health     Financial Resource Strain: Low Risk  (12/3/2023)    Received from [facility]    Overall Financial Resource Strain (CARDIA)     Difficulty of Paying Living Expenses: Not hard at all   Food Insecurity: No Food Insecurity (12/3/2023)    Received from [facility]     "Hunger Vital Sign     Worried About Running Out of Food in the Last Year: Never true     Ran Out of Food in the Last Year: Never true   Transportation Needs: No Transportation Needs (12/3/2023)    Received from [facility]    PRAPARE - Transportation     Lack of Transportation (Medical): No     Lack of Transportation (Non-Medical): No   Physical Activity: Not on file   Stress: Not on file   Social Connections: Not on file   Intimate Partner Violence: Not At Risk (12/3/2023)    Received from [facility]    Humiliation, Afraid, Rape, and Kick questionnaire     Fear of Current or Ex-Partner: No     Emotionally Abused: No     Physically Abused: No     Sexually Abused: No   Housing Stability: Low Risk  (12/3/2023)    Received from [facility]    Housing Stability Vital Sign     Unable to Pay for Housing in the Last Year: No     Number of Places Lived in the Last Year: 1     Unstable Housing in the Last Year: No       Review of Systems   Constitutional:  Negative for chills and fever.   HENT:  Negative for ear pain and sore throat.    Eyes:  Negative for pain and visual disturbance.   Respiratory:  Positive for cough. Negative for shortness of breath.    Cardiovascular:  Negative for chest pain and palpitations.   Gastrointestinal:  Negative for abdominal pain and vomiting.   Genitourinary:  Negative for dysuria and hematuria.   Musculoskeletal:  Negative for arthralgias and back pain.   Skin:  Negative for color change and rash.   Neurological:  Negative for seizures and syncope.   All other systems reviewed and are negative.        Objective:  Vitals:    03/25/24 1006   BP: 122/76   BP Location: Right arm   Patient Position: Sitting   Cuff Size: Large   Pulse: 67   SpO2: 92%   Weight: 118 kg (261 lb)   Height: 6' 1\" (1.854 m)     Body mass index is 34.43 kg/m².     Physical Exam  Vitals and nursing note reviewed.   Constitutional:       Appearance: Normal appearance. He " is obese.   HENT:      Head: Normocephalic and atraumatic.   Cardiovascular:      Rate and Rhythm: Normal rate and regular rhythm.      Heart sounds: Normal heart sounds.   Pulmonary:      Effort: Pulmonary effort is normal.      Breath sounds: Normal breath sounds.   Musculoskeletal:         General: Normal range of motion.      Cervical back: Normal range of motion.   Skin:     General: Skin is warm and dry.   Neurological:      General: No focal deficit present.      Mental Status: He is alert and oriented to person, place, and time. Mental status is at baseline.   Psychiatric:         Mood and Affect: Mood normal.           RESULTS:  Hemoglobin A1C   Date/Time Value Ref Range Status   07/20/2023 11:40 AM 6.3 (H) Normal 3.8-5.6%; PreDiabetic 5.7-6.4%; Diabetic >=6.5%; Glycemic control for adults with diabetes <7.0% % Final     Cholesterol   Date/Time Value Ref Range Status   05/23/2023 10:54  See Comment mg/dL Final     Comment:     Cholesterol:         Pediatric <18 Years        Desirable          <170 mg/dL      Borderline High    170-199 mg/dL      High               >=200 mg/dL        Adult >=18 Years            Desirable        <200 mg/dL      Borderline High  200-239 mg/dL      High             >239 mg/dL       Triglycerides   Date/Time Value Ref Range Status   05/23/2023 10:54 AM 85 See Comment mg/dL Final     Comment:     Triglyceride:     0-9Y            <75mg/dL     10Y-17Y         <90 mg/dL       >=18Y     Normal          <150 mg/dL     Borderline High 150-199 mg/dL     High            200-499 mg/dL        Very High       >499 mg/dL    Specimen collection should occur prior to N-Acetylcysteine or Metamizole administration due to the potential for falsely depressed results.     HDL, Direct   Date/Time Value Ref Range Status   05/23/2023 10:54 AM 46 >=40 mg/dL Final     LDL Calculated   Date/Time Value Ref Range Status   05/23/2023 10:54  (H) 0 - 100 mg/dL Final     Comment:     LDL  Cholesterol:     Optimal           <100 mg/dl     Near Optimal      100-129 mg/dl     Above Optimal       Borderline High 130-159 mg/dl       High            160-189 mg/dl       Very High       >189 mg/dl         This screening LDL is a calculated result.   It does not have the accuracy of the Direct Measured LDL in the monitoring of patients with hyperlipidemia and/or statin therapy.   Direct Measure LDL (AIN082) must be ordered separately in these patients.     Hemoglobin   Date/Time Value Ref Range Status   03/10/2024 09:41 PM 13.6 12.0 - 17.0 g/dL Final     Hematocrit   Date/Time Value Ref Range Status   03/10/2024 09:41 PM 42.1 36.5 - 49.3 % Final     Platelets   Date/Time Value Ref Range Status   03/10/2024 09:41  149 - 390 Thousands/uL Final     Prostate Specific Antigen Total   Date/Time Value Ref Range Status   05/28/2022 09:08 AM 1.9 0.0 - 4.0 ng/mL Final     Comment:     Roche ECLIA methodology.  According to the American Urological Association, Serum PSA should  decrease and remain at undetectable levels after radical  prostatectomy. The AUA defines biochemical recurrence as an initial  PSA value 0.2 ng/mL or greater followed by a subsequent confirmatory  PSA value 0.2 ng/mL or greater.  Values obtained with different assay methods or kits cannot be used  interchangeably. Results cannot be interpreted as absolute evidence  of the presence or absence of malignant disease.     TSH 3RD GENERATON   Date/Time Value Ref Range Status   05/23/2023 10:54 AM 0.832 0.450 - 4.500 uIU/mL Final     Comment:     Adult TSH (3rd generation) reference range follows the recommended guidelines of the American Thyroid Association, January, 2020.     Sodium   Date/Time Value Ref Range Status   03/10/2024 09:41  135 - 147 mmol/L Final   12/03/2023 10:22  135 - 145 mmol/L Final     BUN   Date/Time Value Ref Range Status   03/10/2024 09:41 PM 13 5 - 25 mg/dL Final   12/03/2023 10:22 AM 16 7 - 28 mg/dL Final      Creatinine   Date/Time Value Ref Range Status   03/10/2024 09:41 PM 1.63 (H) 0.60 - 1.30 mg/dL Final     Comment:     Standardized to IDMS reference method   12/03/2023 10:22 AM 1.38 (H) 0.53 - 1.30 mg/dL Final      In chart    This note was created with voice recognition software.  Phonic, grammatical and spelling errors may be present within the note as a result.

## 2024-04-10 DIAGNOSIS — I10 ESSENTIAL HYPERTENSION: ICD-10-CM

## 2024-04-10 RX ORDER — CLONIDINE 0.3 MG/24H
1 PATCH, EXTENDED RELEASE TRANSDERMAL WEEKLY
Qty: 12 PATCH | Refills: 4 | Status: SHIPPED | OUTPATIENT
Start: 2024-04-10 | End: 2024-07-09

## 2024-05-21 ENCOUNTER — TELEPHONE (OUTPATIENT)
Age: 59
End: 2024-05-21

## 2024-05-24 NOTE — TELEPHONE ENCOUNTER
PA for  buPROPion (WELLBUTRIN XL) 150 mg  not required     Reason- (screenshot if applicable)              Message sent to provider pool No

## 2024-05-24 NOTE — TELEPHONE ENCOUNTER
PA for buPROPion (WELLBUTRIN XL) 150 mg     Submitted via    [x]CMM-KEY [id number]  []Surescripts-Case ID #   []Faxed to plan   []Other website   []Phone call Case ID #     Office notes sent, clinical questions answered. Awaiting determination    Turnaround time for your insurance to make a decision on your Prior Authorization can take 7-21 business days.

## 2024-06-10 ENCOUNTER — HOSPITAL ENCOUNTER (EMERGENCY)
Facility: HOSPITAL | Age: 59
Discharge: HOME | End: 2024-06-10
Attending: EMERGENCY MEDICINE
Payer: COMMERCIAL

## 2024-06-10 VITALS
SYSTOLIC BLOOD PRESSURE: 163 MMHG | TEMPERATURE: 99 F | RESPIRATION RATE: 20 BRPM | HEART RATE: 62 BPM | DIASTOLIC BLOOD PRESSURE: 94 MMHG | OXYGEN SATURATION: 97 %

## 2024-06-10 DIAGNOSIS — R10.9 RIGHT FLANK PAIN: Primary | ICD-10-CM

## 2024-06-10 DIAGNOSIS — R19.7 DIARRHEA: ICD-10-CM

## 2024-06-10 LAB
ALBUMIN SERPL BCP-MCNC: 4.2 G/DL (ref 3.5–5)
ALP SERPL-CCNC: 67 U/L (ref 34–104)
ALT SERPL W P-5'-P-CCNC: 35 U/L (ref 7–52)
ANION GAP SERPL CALCULATED.3IONS-SCNC: 7 MMOL/L (ref 4–13)
AST SERPL W P-5'-P-CCNC: 22 U/L (ref 13–39)
BACTERIA UR QL AUTO: NORMAL /HPF
BASOPHILS # BLD AUTO: 0.03 THOUSANDS/ÂΜL (ref 0–0.1)
BASOPHILS NFR BLD AUTO: 1 % (ref 0–1)
BILIRUB SERPL-MCNC: 0.37 MG/DL (ref 0.2–1)
BILIRUB UR QL STRIP: NEGATIVE
BUN SERPL-MCNC: 19 MG/DL (ref 5–25)
CALCIUM SERPL-MCNC: 9 MG/DL (ref 8.4–10.2)
CHLORIDE SERPL-SCNC: 105 MMOL/L (ref 96–108)
CLARITY UR: CLEAR
CO2 SERPL-SCNC: 26 MMOL/L (ref 21–32)
COLOR UR: ABNORMAL
CREAT SERPL-MCNC: 1.4 MG/DL (ref 0.6–1.3)
EOSINOPHIL # BLD AUTO: 0.05 THOUSAND/ÂΜL (ref 0–0.61)
EOSINOPHIL NFR BLD AUTO: 1 % (ref 0–6)
ERYTHROCYTE [DISTWIDTH] IN BLOOD BY AUTOMATED COUNT: 13.4 % (ref 11.6–15.1)
GFR SERPL CREATININE-BSD FRML MDRD: 53 ML/MIN/1.73SQ M
GLUCOSE SERPL-MCNC: 86 MG/DL (ref 65–140)
GLUCOSE UR STRIP-MCNC: NEGATIVE MG/DL
HCT VFR BLD AUTO: 44.5 % (ref 36.5–49.3)
HGB BLD-MCNC: 13.8 G/DL (ref 12–17)
HGB UR QL STRIP.AUTO: ABNORMAL
IMM GRANULOCYTES # BLD AUTO: 0.01 THOUSAND/UL (ref 0–0.2)
IMM GRANULOCYTES NFR BLD AUTO: 0 % (ref 0–2)
KETONES UR STRIP-MCNC: NEGATIVE MG/DL
LEUKOCYTE ESTERASE UR QL STRIP: NEGATIVE
LIPASE SERPL-CCNC: 36 U/L (ref 11–82)
LYMPHOCYTES # BLD AUTO: 1.74 THOUSANDS/ÂΜL (ref 0.6–4.47)
LYMPHOCYTES NFR BLD AUTO: 32 % (ref 14–44)
MCH RBC QN AUTO: 29.3 PG (ref 26.8–34.3)
MCHC RBC AUTO-ENTMCNC: 31 G/DL (ref 31.4–37.4)
MCV RBC AUTO: 95 FL (ref 82–98)
MONOCYTES # BLD AUTO: 0.4 THOUSAND/ÂΜL (ref 0.17–1.22)
MONOCYTES NFR BLD AUTO: 7 % (ref 4–12)
NEUTROPHILS # BLD AUTO: 3.28 THOUSANDS/ÂΜL (ref 1.85–7.62)
NEUTS SEG NFR BLD AUTO: 59 % (ref 43–75)
NITRITE UR QL STRIP: NEGATIVE
NON-SQ EPI CELLS URNS QL MICRO: NORMAL /HPF
NRBC BLD AUTO-RTO: 0 /100 WBCS
PH UR STRIP.AUTO: 5.5 [PH]
PLATELET # BLD AUTO: 213 THOUSANDS/UL (ref 149–390)
PMV BLD AUTO: 9.7 FL (ref 8.9–12.7)
POTASSIUM SERPL-SCNC: 4.2 MMOL/L (ref 3.5–5.3)
PROT SERPL-MCNC: 7 G/DL (ref 6.4–8.4)
PROT UR STRIP-MCNC: ABNORMAL MG/DL
RBC # BLD AUTO: 4.71 MILLION/UL (ref 3.88–5.62)
RBC #/AREA URNS AUTO: NORMAL /HPF
SODIUM SERPL-SCNC: 138 MMOL/L (ref 135–147)
SP GR UR STRIP.AUTO: 1.02 (ref 1–1.03)
UROBILINOGEN UR STRIP-ACNC: <2 MG/DL
WBC # BLD AUTO: 5.51 THOUSAND/UL (ref 4.31–10.16)
WBC #/AREA URNS AUTO: NORMAL /HPF

## 2024-06-10 PROCEDURE — 99284 EMERGENCY DEPT VISIT MOD MDM: CPT

## 2024-06-10 PROCEDURE — 85025 COMPLETE CBC W/AUTO DIFF WBC: CPT | Performed by: EMERGENCY MEDICINE

## 2024-06-10 PROCEDURE — 36415 COLL VENOUS BLD VENIPUNCTURE: CPT

## 2024-06-10 PROCEDURE — 80053 COMPREHEN METABOLIC PANEL: CPT | Performed by: EMERGENCY MEDICINE

## 2024-06-10 PROCEDURE — 99284 EMERGENCY DEPT VISIT MOD MDM: CPT | Performed by: PHYSICIAN ASSISTANT

## 2024-06-10 PROCEDURE — 83690 ASSAY OF LIPASE: CPT | Performed by: EMERGENCY MEDICINE

## 2024-06-10 PROCEDURE — 81001 URINALYSIS AUTO W/SCOPE: CPT | Performed by: EMERGENCY MEDICINE

## 2024-06-12 NOTE — ED PROVIDER NOTES
History  Chief Complaint   Patient presents with    Abdominal Pain     R upper and lower quadrant pain radiating to right flank for the past 3 weeks. Recently had thyroidectomy performed this past February [facility]. Patient reports dribbling urine with their pain.      60 yo with right flank and RUQ pain for 3 weeks. Comes and goes lasting for a few seconds at a time. Does not seem to have any association with food or drink. He denies n/v. No chest pain or sob. No fever or chills. No trauma or injury. Seen at outside hospital recently for this pain and had workup including CT scan which came back negative.       History provided by:  Patient   used: No    Abdominal Pain  Associated symptoms: no chest pain, no chills, no cough, no dysuria, no fever, no hematuria, no shortness of breath, no sore throat and no vomiting        Prior to Admission Medications   Prescriptions Last Dose Informant Patient Reported? Taking?   NIFEdipine 0.3%-lidocaine 5% rectal ointment  Self No No   Sig: Apply 1 Application topically every 4 (four) hours as needed for discomfort or pain   Omega-3 Fatty Acids (fish oil) 1,000 mg  Self Yes No   Sig: Take 1,000 mg by mouth daily   amLODIPine-benazepril (LOTREL) 10-40 MG per capsule  Self No No   Sig: Take 1 capsule by mouth daily   buPROPion (WELLBUTRIN XL) 150 mg 24 hr tablet  Self No No   Sig: Take 1 tablet (150 mg total) by mouth every morning   cloNIDine (CATAPRES-TTS-3) 0.3 mg/24 hr   No No   Sig: PLACE 1 PATCH (0.3 MG TOTAL) ON THE SKIN OVER 7 DAYS ONCE A WEEK   multivitamin (THERAGRAN) TABS  Self Yes No   Sig: Take 1 tablet by mouth daily   tadalafil (CIALIS) 5 MG tablet  Self No No   Sig: Take 1 tablet (5 mg total) by mouth daily as needed for erectile dysfunction PRN   traMADol (Ultram) 50 mg tablet   No No   Sig: Take 1 tablet (50 mg total) by mouth every 6 (six) hours as needed for moderate pain for up to 20 doses      Facility-Administered Medications:  None       Past Medical History:   Diagnosis Date    Chronic kidney disease     Colon polyp     Depression     Epididymitis 8/18/22    Hypertension        Past Surgical History:   Procedure Laterality Date    COLONOSCOPY      COLONOSCOPY      SHOULDER SURGERY Left 10/2022    US GUIDED THYROID BIOPSY  11/14/2023    WISDOM TOOTH EXTRACTION         Family History   Problem Relation Age of Onset    Hypertension Mother     Arthritis Mother     No Known Problems Father     Pancreatic cancer Sister     Other Sister         AIDS    Pancreatic cancer Brother     No Known Problems Brother     No Known Problems Brother      I have reviewed and agree with the history as documented.    E-Cigarette/Vaping    E-Cigarette Use Never User      E-Cigarette/Vaping Substances    Nicotine No     THC No     CBD No     Flavoring No     Other No     Unknown No      Social History     Tobacco Use    Smoking status: Never     Passive exposure: Never    Smokeless tobacco: Never   Vaping Use    Vaping status: Never Used   Substance Use Topics    Alcohol use: Yes     Alcohol/week: 1.0 standard drink of alcohol     Types: 1 Standard drinks or equivalent per week     Comment: Once a week    Drug use: Never       Review of Systems   Constitutional:  Negative for chills and fever.   HENT:  Negative for ear pain and sore throat.    Eyes:  Negative for pain and visual disturbance.   Respiratory:  Negative for cough and shortness of breath.    Cardiovascular:  Negative for chest pain and palpitations.   Gastrointestinal:  Positive for abdominal pain. Negative for vomiting.   Genitourinary:  Negative for dysuria and hematuria.   Musculoskeletal:  Negative for arthralgias and back pain.   Skin:  Negative for color change and rash.   Neurological:  Negative for seizures and syncope.   All other systems reviewed and are negative.      Physical Exam  Physical Exam  Vitals and nursing note reviewed.   Constitutional:       General: He is not in acute  distress.     Appearance: He is well-developed.   HENT:      Head: Normocephalic and atraumatic.   Eyes:      Conjunctiva/sclera: Conjunctivae normal.   Cardiovascular:      Rate and Rhythm: Normal rate and regular rhythm.      Heart sounds: No murmur heard.  Pulmonary:      Effort: Pulmonary effort is normal. No respiratory distress.      Breath sounds: Normal breath sounds.   Abdominal:      Palpations: Abdomen is soft.      Tenderness: There is abdominal tenderness in the right upper quadrant.   Musculoskeletal:         General: No swelling.      Cervical back: Neck supple.   Skin:     General: Skin is warm and dry.      Capillary Refill: Capillary refill takes less than 2 seconds.   Neurological:      Mental Status: He is alert.   Psychiatric:         Mood and Affect: Mood normal.         Vital Signs  ED Triage Vitals [06/10/24 1823]   Temperature Pulse Respirations Blood Pressure SpO2   99 °F (37.2 °C) 75 20 138/78 98 %      Temp Source Heart Rate Source Patient Position - Orthostatic VS BP Location FiO2 (%)   Oral Monitor Sitting Left arm --      Pain Score       10 - Worst Possible Pain           Vitals:    06/10/24 1823 06/10/24 2014   BP: 138/78 163/94   Pulse: 75 62   Patient Position - Orthostatic VS: Sitting Lying         Visual Acuity      ED Medications  Medications - No data to display    Diagnostic Studies  Results Reviewed       Procedure Component Value Units Date/Time    Urine Microscopic [[id number]]  (Normal) Collected: 06/10/24 2014    Lab Status: Final result Specimen: Urine, Clean Catch Updated: 06/10/24 2023     RBC, UA 1-2 /hpf      WBC, UA 1-2 /hpf      Epithelial Cells None Seen /hpf      Bacteria, UA None Seen /hpf     UA w Reflex to Microscopic w Reflex to Culture [[id number]]  (Abnormal) Collected: 06/10/24 2014    Lab Status: Final result Specimen: Urine, Clean Catch Updated: 06/10/24 2022     Color, UA Light Yellow     Clarity, UA Clear     Specific Gravity, UA 1.021     pH, UA 5.5      Leukocytes, UA Negative     Nitrite, UA Negative     Protein, UA 30 (1+) mg/dl      Glucose, UA Negative mg/dl      Ketones, UA Negative mg/dl      Urobilinogen, UA <2.0 mg/dl      Bilirubin, UA Negative     Occult Blood, UA Small    Comprehensive metabolic panel [[id number]]  (Abnormal) Collected: 06/10/24 1829    Lab Status: Final result Specimen: Blood from Hand, Right Updated: 06/10/24 1911     Sodium 138 mmol/L      Potassium 4.2 mmol/L      Chloride 105 mmol/L      CO2 26 mmol/L      ANION GAP 7 mmol/L      BUN 19 mg/dL      Creatinine 1.40 mg/dL      Glucose 86 mg/dL      Calcium 9.0 mg/dL      AST 22 U/L      ALT 35 U/L      Alkaline Phosphatase 67 U/L      Total Protein 7.0 g/dL      Albumin 4.2 g/dL      Total Bilirubin 0.37 mg/dL      eGFR 53 ml/min/1.73sq m     Narrative:      National Kidney Disease Foundation guidelines for Chronic Kidney Disease (CKD):     Stage 1 with normal or high GFR (GFR > 90 mL/min/1.73 square meters)    Stage 2 Mild CKD (GFR = 60-89 mL/min/1.73 square meters)    Stage 3A Moderate CKD (GFR = 45-59 mL/min/1.73 square meters)    Stage 3B Moderate CKD (GFR = 30-44 mL/min/1.73 square meters)    Stage 4 Severe CKD (GFR = 15-29 mL/min/1.73 square meters)    Stage 5 End Stage CKD (GFR <15 mL/min/1.73 square meters)  Note: GFR calculation is accurate only with a steady state creatinine    Lipase [[id number]]  (Normal) Collected: 06/10/24 1829    Lab Status: Final result Specimen: Blood from Hand, Right Updated: 06/10/24 1911     Lipase 36 u/L     CBC and differential [[id number]]  (Abnormal) Collected: 06/10/24 1829    Lab Status: Final result Specimen: Blood from Hand, Right Updated: 06/10/24 1835     WBC 5.51 Thousand/uL      RBC 4.71 Million/uL      Hemoglobin 13.8 g/dL      Hematocrit 44.5 %      MCV 95 fL      MCH 29.3 pg      MCHC 31.0 g/dL      RDW 13.4 %      MPV 9.7 fL      Platelets 213 Thousands/uL      nRBC 0 /100 WBCs      Segmented % 59 %      Immature Grans % 0 %       Lymphocytes % 32 %      Monocytes % 7 %      Eosinophils Relative 1 %      Basophils Relative 1 %      Absolute Neutrophils 3.28 Thousands/µL      Absolute Immature Grans 0.01 Thousand/uL      Absolute Lymphocytes 1.74 Thousands/µL      Absolute Monocytes 0.40 Thousand/µL      Eosinophils Absolute 0.05 Thousand/µL      Basophils Absolute 0.03 Thousands/µL                    No orders to display              Procedures  Procedures         ED Course                               SBIRT 20yo+      Flowsheet Row Most Recent Value   Initial Alcohol Screen: US AUDIT-C     1. How often do you have a drink containing alcohol? 0 Filed at: 06/10/2024 2025   2. How many drinks containing alcohol do you have on a typical day you are drinking?  0 Filed at: 06/10/2024 2025   3a. Male UNDER 65: How often do you have five or more drinks on one occasion? 0 Filed at: 06/10/2024 2025   Audit-C Score 0 Filed at: 06/10/2024 2025   SHINE: How many times in the past year have you...    Used an illegal drug or used a prescription medication for non-medical reasons? Never Filed at: 06/10/2024 2025                      Medical Decision Making  Ddx includes but is not limited to PUD, gastritis, gerd, pancreatitis, biliary etiology, hepatitis, IBS, IBD. Plan: labs.     MDM: 60 yo with abd pain. Labs unremarkable. We discussed repeat imaging however given he has had 3 weeks of symptoms, normal labs and reassuring exam we discussed close outpatient f/u and deferring CT. Pt would like to hold off on repeat imaging. Etiology of pain is unclear. Could be GI. F/u PCP and gi. Return parameters provided. Pt understands and agrees with plan.      Amount and/or Complexity of Data Reviewed  Labs: ordered.             Disposition  Final diagnoses:   Right flank pain   Diarrhea     Time reflects when diagnosis was documented in both MDM as applicable and the Disposition within this note       Time User Action Codes Description Comment    6/10/2024  8:46 PM  Josue Ibarra Add [R10.9] Right flank pain     6/10/2024  8:46 PM Josue Ibarra Add [R19.7] Diarrhea           ED Disposition       ED Disposition   Discharge    Condition   Stable    Date/Time   Mon Wesley 10, 2024 2046    Comment   Agusto Annacome discharge to home/self care.                   Follow-up Information       Follow up With Specialties Details Why Contact Info Additional Information    Deon Mcadams MD Internal Medicine   [address]  [address]  [facility] [address]  [phone]       [facility]   [address] [facility] [id number] [phone] [facility] [place], [address] [facility], [facility], [place], [place], [id number] [phone]     Andrés Shah MD General Surgery   [address]  [address]  [facility] [address]  [phone]               Discharge Medication List as of 6/10/2024  8:47 PM        CONTINUE these medications which have NOT CHANGED    Details   amLODIPine-benazepril (LOTREL) 10-40 MG per capsule Take 1 capsule by mouth daily, Starting Thu 2/22/2024, Normal      buPROPion (WELLBUTRIN XL) 150 mg 24 hr tablet Take 1 tablet (150 mg total) by mouth every morning, Starting Thu 2/22/2024, Normal      cloNIDine (CATAPRES-TTS-3) 0.3 mg/24 hr PLACE 1 PATCH (0.3 MG TOTAL) ON THE SKIN OVER 7 DAYS ONCE A WEEK, Starting Wed 4/10/2024, Until Tue 7/9/2024, Normal      multivitamin (THERAGRAN) TABS Take 1 tablet by mouth daily, Historical Med      NIFEdipine 0.3%-lidocaine 5% rectal ointment Apply 1 Application topically every 4 (four) hours as needed for discomfort or pain, Starting Thu 2/22/2024, Normal      Omega-3 Fatty Acids (fish oil) 1,000 mg Take 1,000 mg by mouth daily, Historical Med      tadalafil (CIALIS) 5 MG tablet Take 1 tablet (5 mg total) by mouth daily as needed for erectile dysfunction PRN, Starting Thu 2/22/2024, No  Print      traMADol (Ultram) 50 mg tablet Take 1 tablet (50 mg total) by mouth every 6 (six) hours as needed for moderate pain for up to 20 doses, Starting Fri 2/23/2024, Normal                 PDMP Review         Value Time User    PDMP Reviewed  Yes 2/23/2024 12:29 PM Neeraj Jeffrey MD            ED Provider  Electronically Signed by             Josue Ibarra PA-C  06/12/24 5808

## 2024-06-14 ENCOUNTER — APPOINTMENT (OUTPATIENT)
Dept: LAB | Facility: HOSPITAL | Age: 59
End: 2024-06-14

## 2024-06-14 ENCOUNTER — OFFICE VISIT (OUTPATIENT)
Dept: GASTROENTEROLOGY | Facility: CLINIC | Age: 59
End: 2024-06-14
Payer: COMMERCIAL

## 2024-06-14 VITALS
OXYGEN SATURATION: 94 % | TEMPERATURE: 97.2 F | WEIGHT: 251 LBS | DIASTOLIC BLOOD PRESSURE: 76 MMHG | HEIGHT: 73 IN | HEART RATE: 60 BPM | SYSTOLIC BLOOD PRESSURE: 105 MMHG | BODY MASS INDEX: 33.27 KG/M2

## 2024-06-14 DIAGNOSIS — R19.7 DIARRHEA: ICD-10-CM

## 2024-06-14 DIAGNOSIS — R10.9 RIGHT FLANK PAIN: ICD-10-CM

## 2024-06-14 PROCEDURE — 99214 OFFICE O/P EST MOD 30 MIN: CPT | Performed by: INTERNAL MEDICINE

## 2024-06-14 NOTE — PROGRESS NOTES
[facility] - Outpatient Consultation  Agusto Riojas 59 y.o. male MRN: [id number]  Encounter: [id number]          ASSESSMENT AND PLAN:      1. Right flank pain  - Ambulatory Referral to Gastroenterology    2. Diarrhea  - Ambulatory Referral to Gastroenterology  - Stool Enteric Bacterial Panel by PCR; Future  - Clostridium difficile toxin by PCR with EIA; Future  - Ova and parasite examination; Future  - Enteric Parasite Panel; Future  - Stool Enteric Bacterial Panel by PCR  - Clostridium difficile toxin by PCR with EIA  - Ova and parasite examination  -No plans at this time for colonoscopy  -Further recommendations will be provided once the above stool tests have been performed.  -Given the acute onset of the diarrhea, I suspect the foodborne illness with either bacterial or viral etiology    ______________________________________________________________________    HPI: Agusto is a 59-year-old male who comes to the office with a complaint of diarrhea which is occurring 3 times daily and has been ongoing for the past couple weeks.  Been worse for the past week.  There is no associated nausea and vomiting.  He admits to heartburn on occasion.  There is no dysphagia or odynophagia.  He had an episode of rectal bleeding 3 weeks ago.  He had about 5 or 6 pounds of weight loss over the past several weeks.  He denied foreign travel, contacts with similar illness, new medications.  He states that he has taken an antibiotic 3 weeks ago.  He had bilateral hernia repair 3 weeks ago and the diarrhea seemed to occur after this.      REVIEW OF SYSTEMS:    CONSTITUTIONAL: Denies any fever, chills, rigors, and weight loss.  HEENT: No earache or tinnitus. Denies hearing loss or visual disturbances.  CARDIOVASCULAR: No chest pain or palpitations.   RESPIRATORY: Denies any cough, hemoptysis, shortness of breath or dyspnea on exertion.  GASTROINTESTINAL: As noted in the History of Present Illness.    GENITOURINARY: No problems with urination. Denies any hematuria or dysuria.  NEUROLOGIC: No dizziness or vertigo, denies headaches.   MUSCULOSKELETAL: Denies any muscle or joint pain.   SKIN: Denies skin rashes or itching.   ENDOCRINE: Denies excessive thirst. Denies intolerance to heat or cold.  PSYCHOSOCIAL: Denies depression or anxiety. Denies any recent memory loss.       Historical Information   Past Medical History:   Diagnosis Date    Chronic kidney disease     Colon polyp     Depression     Epididymitis 8/18/22    Hypertension      Past Surgical History:   Procedure Laterality Date    COLONOSCOPY      COLONOSCOPY      SHOULDER SURGERY Left 10/2022    THYROID SURGERY  04/02/2024    US GUIDED THYROID BIOPSY  11/14/2023    WISDOM TOOTH EXTRACTION       Social History   Social History     Substance and Sexual Activity   Alcohol Use Yes    Alcohol/week: 1.0 standard drink of alcohol    Types: 1 Standard drinks or equivalent per week    Comment: Once a week     Social History     Substance and Sexual Activity   Drug Use Never     Social History     Tobacco Use   Smoking Status Never    Passive exposure: Never   Smokeless Tobacco Never     Family History   Problem Relation Age of Onset    Hypertension Mother     Arthritis Mother     No Known Problems Father     Pancreatic cancer Sister     Cancer Sister     Other Sister         AIDS    Pancreatic cancer Brother     Cancer Brother     No Known Problems Brother     No Known Problems Brother        Meds/Allergies       Current Outpatient Medications:     amLODIPine-benazepril (LOTREL) 10-40 MG per capsule    buPROPion (WELLBUTRIN XL) 150 mg 24 hr tablet    cloNIDine (CATAPRES-TTS-3) 0.3 mg/24 hr    multivitamin (THERAGRAN) TABS    NIFEdipine 0.3%-lidocaine 5% rectal ointment    Omega-3 Fatty Acids (fish oil) 1,000 mg    tadalafil (CIALIS) 5 MG tablet    traMADol (Ultram) 50 mg tablet    No Known Allergies        Objective     Blood pressure 105/76, pulse 60,  "temperature (!) 97.2 °F (36.2 °C), temperature source Temporal, height 6' 1.05\" (1.855 m), weight 114 kg (251 lb), SpO2 94%. Body mass index is 33.07 kg/m².        PHYSICAL EXAM:      General Appearance:   Alert, cooperative, no distress   HEENT:   Normocephalic, atraumatic, anicteric.     Neck:  Supple, symmetrical, trachea midline   Lungs:   Clear to auscultation bilaterally; no rales, rhonchi or wheezing; respirations unlabored    Heart::   Regular rate and rhythm; no murmur, rub, or gallop.   Abdomen:   Soft, non-tender, non-distended; normal bowel sounds; no masses, no organomegaly    Genitalia:   Deferred    Rectal:   Deferred    Extremities:  No cyanosis, clubbing or edema    Pulses:  2+ and symmetric    Skin:  No jaundice, rashes, or lesions    Lymph nodes:  No palpable cervical lymphadenopathy        Lab Results:   No visits with results within 1 Day(s) from this visit.   Latest known visit with results is:   Admission on 06/10/2024, Discharged on 06/10/2024   Component Date Value    WBC 06/10/2024 5.51     RBC 06/10/2024 4.71     Hemoglobin 06/10/2024 13.8     Hematocrit 06/10/2024 44.5     MCV 06/10/2024 95     MCH 06/10/2024 29.3     MCHC 06/10/2024 31.0 (L)     RDW 06/10/2024 13.4     MPV 06/10/2024 9.7     Platelets 06/10/2024 213     nRBC 06/10/2024 0     Segmented % 06/10/2024 59     Immature Grans % 06/10/2024 0     Lymphocytes % 06/10/2024 32     Monocytes % 06/10/2024 7     Eosinophils Relative 06/10/2024 1     Basophils Relative 06/10/2024 1     Absolute Neutrophils 06/10/2024 3.28     Absolute Immature Grans 06/10/2024 0.01     Absolute Lymphocytes 06/10/2024 1.74     Absolute Monocytes 06/10/2024 0.40     Eosinophils Absolute 06/10/2024 0.05     Basophils Absolute 06/10/2024 0.03     Sodium 06/10/2024 138     Potassium 06/10/2024 4.2     Chloride 06/10/2024 105     CO2 06/10/2024 26     ANION GAP 06/10/2024 7     BUN 06/10/2024 19     Creatinine 06/10/2024 1.40 (H)     Glucose 06/10/2024 86     " Calcium 06/10/2024 9.0     AST 06/10/2024 22     ALT 06/10/2024 35     Alkaline Phosphatase 06/10/2024 67     Total Protein 06/10/2024 7.0     Albumin 06/10/2024 4.2     Total Bilirubin 06/10/2024 0.37     eGFR 06/10/2024 53     Lipase 06/10/2024 36     Color, UA 06/10/2024 Light Yellow     Clarity, UA 06/10/2024 Clear     Specific Gravity, UA 06/10/2024 1.021     pH, UA 06/10/2024 5.5     Leukocytes, UA 06/10/2024 Negative     Nitrite, UA 06/10/2024 Negative     Protein, UA 06/10/2024 30 (1+) (A)     Glucose, UA 06/10/2024 Negative     Ketones, UA 06/10/2024 Negative     Urobilinogen, UA 06/10/2024 <2.0     Bilirubin, UA 06/10/2024 Negative     Occult Blood, UA 06/10/2024 Small (A)     RBC, UA 06/10/2024 1-2     WBC, UA 06/10/2024 1-2     Epithelial Cells 06/10/2024 None Seen     Bacteria, UA 06/10/2024 None Seen          Radiology Results:   CT renal stone study abdomen pelvis wo contrast    Result Date: 5/31/2024  Narrative: PROCEDURE INFORMATION: Exam: CT Abdomen And Pelvis Without Contrast Exam date and time: 5/31/2024 8:55 PM Age: 59 years old Clinical indication: Abdominal pain; Additional info: Abdominal/flank pain, stone suspected TECHNIQUE: Imaging protocol: Computed tomography of the abdomen and pelvis without contrast. Radiation optimization: All CT scans at this facility use at least one of these dose optimization techniques: automated exposure control; mA and/or kV adjustment per patient size (includes targeted exams where dose is matched to clinical indication); or iterative reconstruction. COMPARISON: CT CHEST WWO CONTRAST 12/3/2023 11:56 AM FINDINGS: Liver: Normal. No mass. Gallbladder and bile ducts: Gallbladder contracted. Pancreas: Normal. No ductal dilation. Spleen: Normal. No splenomegaly. Adrenal glands: Normal. No mass. Kidneys and ureters: No calcified stones within the kidneys, ureters or bladder. No hydronephrosis or hydroureter. Stomach and bowel: Mild colonic diverticulosis particularly  "involving descending-sigmoid colon. No radiographic signs of associated inflammation. No evidence of small bowel obstruction. Appendix: Visualized portion appendix normal. Intraperitoneal space: Unremarkable. No free air. No significant fluid collection. Vasculature: Unremarkable. No abdominal aortic aneurysm. Lymph nodes: Unremarkable. No enlarged lymph nodes. Urinary bladder: See \"Kidneys and ureters\" finding. Reproductive: Prostate appears within normal limits. Bones/joints: Chronic degenerative changes of the lumbar spine. Soft tissues: Increased fat in bilateral inguinal canals- small inguinal hernias.  Ovoid 3 cm lipoma within the left posterior abdominal wall musculature.    Impression: IMPRESSION: 1.   No calcified stones within the kidneys, ureters or bladder. No hydronephrosis or hydroureter. 2.   Mild colonic diverticulosis particularly involving descending-sigmoid colon. No radiographic signs of associated inflammation. 3.   No evidence of small bowel obstruction.     XR chest portable    Result Date: 5/31/2024  Narrative: STUDY:  XR CHEST 1 VW PORTABLE INDICATION: Shortness of breath.   COMPARISON: 12/3/2023 TECHNIQUE:  Portable frontal view. FINDINGS: Mild bibasilar patchy opacities are seen either atelectasis or infiltrate or superimposed vasculature. The cardiomediastinal configuration is within normal limits. No evidence of pulmonary edema.    Impression: IMPRESSION:   Mild bibasilar patchy opacities either atelectasis or infiltrate or superimposed vasculature. Workstation:[id number]    Answers submitted by the patient for this visit:  Abdominal Pain Questionnaire (Submitted on 6/13/2024)  Chief Complaint: Abdominal pain  Chronicity: chronic  Onset: more than 1 year ago  Onset quality: sudden  Frequency: daily  Episode duration: 6 Months  Progression since onset: waxing and waning  Pain location: RLQ, RUQ, generalized abdominal region, right flank  Pain - numeric: 8/10  Pain quality: sharp  Radiates " to: RLQ, left shoulder, right shoulder, chest, back, right flank, perineum  anorexia: Yes  arthralgias: Yes  belching: Yes  constipation: Yes  diarrhea: Yes  dysuria: Yes  fever: Yes  flatus: Yes  frequency: Yes  headaches: Yes  hematochezia: Yes  hematuria: Yes  myalgias: Yes  nausea: Yes  Aggravated by: certain positions, coughing, movement  Relieved by: nothing  Diagnostic workup: CT scan

## 2024-06-15 ENCOUNTER — APPOINTMENT (OUTPATIENT)
Dept: LAB | Facility: HOSPITAL | Age: 59
End: 2024-06-15
Payer: COMMERCIAL

## 2024-06-15 DIAGNOSIS — R19.7 DIARRHEA: ICD-10-CM

## 2024-06-15 DIAGNOSIS — R19.7 DIARRHEA OF PRESUMED INFECTIOUS ORIGIN: ICD-10-CM

## 2024-06-15 PROCEDURE — 87177 OVA AND PARASITES SMEARS: CPT

## 2024-06-15 PROCEDURE — 87505 NFCT AGENT DETECTION GI: CPT

## 2024-06-15 PROCEDURE — 87493 C DIFF AMPLIFIED PROBE: CPT

## 2024-06-15 PROCEDURE — 87209 SMEAR COMPLEX STAIN: CPT

## 2024-06-16 LAB
C COLI+JEJUNI TUF STL QL NAA+PROBE: NEGATIVE
C DIFF TOX B TCDB STL QL NAA+PROBE: NEGATIVE
EC STX1+STX2 GENES STL QL NAA+PROBE: NEGATIVE
SALMONELLA SP SPAO STL QL NAA+PROBE: NEGATIVE
SHIGELLA SP+EIEC IPAH STL QL NAA+PROBE: NEGATIVE

## 2024-06-17 ENCOUNTER — TREATMENT (OUTPATIENT)
Dept: GASTROENTEROLOGY | Facility: CLINIC | Age: 59
End: 2024-06-17

## 2024-06-17 DIAGNOSIS — R10.84 GENERALIZED ABDOMINAL PAIN: Primary | ICD-10-CM

## 2024-06-17 LAB
CRYPTOSP DNA SPEC QL NAA+PROBE: NEGATIVE
E HISTOLYT DNA SPEC QL NAA+PROBE: NEGATIVE
G LAMBLIA DNA SPEC QL NAA+PROBE: NEGATIVE

## 2024-06-17 RX ORDER — DICYCLOMINE HCL 20 MG
20 TABLET ORAL EVERY 6 HOURS
Qty: 36 TABLET | Refills: 1 | Status: SHIPPED | OUTPATIENT
Start: 2024-06-17

## 2024-06-18 ENCOUNTER — OFFICE VISIT (OUTPATIENT)
Dept: SURGERY | Facility: CLINIC | Age: 59
End: 2024-06-18
Payer: COMMERCIAL

## 2024-06-18 VITALS
HEIGHT: 73 IN | DIASTOLIC BLOOD PRESSURE: 72 MMHG | OXYGEN SATURATION: 95 % | TEMPERATURE: 97.9 F | RESPIRATION RATE: 18 BRPM | WEIGHT: 253 LBS | BODY MASS INDEX: 33.53 KG/M2 | SYSTOLIC BLOOD PRESSURE: 138 MMHG | HEART RATE: 62 BPM

## 2024-06-18 DIAGNOSIS — R07.9 RIGHT-SIDED CHEST PAIN: Primary | ICD-10-CM

## 2024-06-18 PROCEDURE — 99203 OFFICE O/P NEW LOW 30 MIN: CPT | Performed by: SURGERY

## 2024-06-18 NOTE — PROGRESS NOTES
Consult- General Surgery   Agusto Riojas 59 y.o. male MRN: [id number]  Unit/Bed#:  Encounter: [id number]    Assessment & Plan     Assessment:  Right chest wall pain, most likely musculoskeletal in origin  History of chronic kidney disease  History of hypertension  History of depression  Plan:  The patient has atypical symptoms for intra-abdominal pathology, most likely her symptoms are related to musculoskeletal in origin after lifting his brother from a wheelchair.  No further workup or intervention is needed from the general surgery standpoint.  The patient is discharged from my care and I will be glad to see him if any problem arises in the future.    History of Present Illness     HPI:  Agusto Riojas is a 59 y.o. male who presents to my office after emergency room visit from [facility] secondary to right-sided abdominal pain.  Patient stated the he has been experiencing pain on the right side of his abdomen a little bit over a month, it started after lifting his brother from a wheelchair.  The pain usually last for a couple of minutes and is several times a day, not related to any food ingestion but usually exacerbated with twisting and bending over.  He denies having any fever, chills, nausea, vomiting, diarrhea, constipation or any other constitutional symptoms.  I reviewed the emergency room notes, and labs.  There was no imaging studies done at that time.  Patient was also seen in the emergency room at [facility] where he had CT of the abdomen renal protocol.    Review of Systems  The rest of the review of system total of 10 were negative except for the HPI.    Historical Information   Past Medical History:   Diagnosis Date    Chronic kidney disease     Colon polyp     Depression     Epididymitis 8/18/22    Hypertension      Past Surgical History:   Procedure Laterality Date    COLONOSCOPY      COLONOSCOPY      SHOULDER SURGERY Left 10/2022    THYROID SURGERY  04/02/2024    US GUIDED THYROID BIOPSY   "11/14/2023    WISDOM TOOTH EXTRACTION       Social History   Social History     Substance and Sexual Activity   Alcohol Use Yes    Alcohol/week: 1.0 standard drink of alcohol    Types: 1 Standard drinks or equivalent per week    Comment: Once a week     Social History     Substance and Sexual Activity   Drug Use Never     Social History     Tobacco Use   Smoking Status Never    Passive exposure: Never   Smokeless Tobacco Never     Family History: non-contributory    Meds/Allergies   all medications and allergies reviewed     Current Outpatient Medications:     amLODIPine-benazepril (LOTREL) 10-40 MG per capsule, Take 1 capsule by mouth daily, Disp: 90 capsule, Rfl: 3    buPROPion (WELLBUTRIN XL) 150 mg 24 hr tablet, Take 1 tablet (150 mg total) by mouth every morning, Disp: 90 tablet, Rfl: 2    cloNIDine (CATAPRES-TTS-3) 0.3 mg/24 hr, PLACE 1 PATCH (0.3 MG TOTAL) ON THE SKIN OVER 7 DAYS ONCE A WEEK, Disp: 12 patch, Rfl: 4    dicyclomine (BENTYL) 20 mg tablet, Take 1 tablet (20 mg total) by mouth every 6 (six) hours, Disp: 36 tablet, Rfl: 1    multivitamin (THERAGRAN) TABS, Take 1 tablet by mouth daily, Disp: , Rfl:     NIFEdipine 0.3%-lidocaine 5% rectal ointment, Apply 1 Application topically every 4 (four) hours as needed for discomfort or pain, Disp: 10 g, Rfl: 0    Omega-3 Fatty Acids (fish oil) 1,000 mg, Take 1,000 mg by mouth daily, Disp: , Rfl:     tadalafil (CIALIS) 5 MG tablet, Take 1 tablet (5 mg total) by mouth daily as needed for erectile dysfunction PRN, Disp: , Rfl:     traMADol (Ultram) 50 mg tablet, Take 1 tablet (50 mg total) by mouth every 6 (six) hours as needed for moderate pain for up to 20 doses, Disp: 20 tablet, Rfl: 0  No Known Allergies    Objective     Current Vitals:   Blood Pressure: 138/72 (06/18/24 1004)  Pulse: 62 (06/18/24 1004)  Temperature: 97.9 °F (36.6 °C) (06/18/24 1004)  Respirations: 18 (06/18/24 1004)  Height: 6' 1\" (185.4 cm) (06/18/24 1004)  Weight - Scale: 115 kg (253 lb) " (06/18/24 1004)  SpO2: 95 % (06/18/24 1004)    Physical Exam  Vitals and nursing note reviewed.   Constitutional:       General: He is not in acute distress.     Appearance: He is obese.   Cardiovascular:      Rate and Rhythm: Normal rate and regular rhythm.      Heart sounds: No murmur heard.  Pulmonary:      Effort: No respiratory distress.      Breath sounds: Normal breath sounds.   Abdominal:      Comments: Abdomen is obese, soft, nondistended no abdominal tenderness.  There is tenderness over the right costochondral area, no obvious mass palpable in that area, the pain is reproducible, no skin color changes, no evidence of infection or hematoma.  There is no mass palpable visceromegaly.  There is no abdominal surgical scars.   Skin:     General: Skin is warm.      Coloration: Skin is not jaundiced.      Findings: No erythema or rash.   Neurological:      Mental Status: He is alert and oriented to person, place, and time.      Cranial Nerves: No cranial nerve deficit.   Psychiatric:         Mood and Affect: Mood normal.         Behavior: Behavior normal.     Lab Results: I have personally reviewed pertinent lab results.    Imaging: I have personally reviewed pertinent reports.   and I have personally reviewed pertinent films in PACS  Procedure: CT renal stone study abdomen pelvis wo contrast    Result Date: 5/31/2024  Narrative: PROCEDURE INFORMATION: Exam: CT Abdomen And Pelvis Without Contrast Exam date and time: 5/31/2024 8:55 PM Age: 59 years old Clinical indication: Abdominal pain; Additional info: Abdominal/flank pain, stone suspected TECHNIQUE: Imaging protocol: Computed tomography of the abdomen and pelvis without contrast. Radiation optimization: All CT scans at this facility use at least one of these dose optimization techniques: automated exposure control; mA and/or kV adjustment per patient size (includes targeted exams where dose is matched to clinical indication); or iterative reconstruction.  "COMPARISON: CT CHEST WWO CONTRAST 12/3/2023 11:56 AM FINDINGS: Liver: Normal. No mass. Gallbladder and bile ducts: Gallbladder contracted. Pancreas: Normal. No ductal dilation. Spleen: Normal. No splenomegaly. Adrenal glands: Normal. No mass. Kidneys and ureters: No calcified stones within the kidneys, ureters or bladder. No hydronephrosis or hydroureter. Stomach and bowel: Mild colonic diverticulosis particularly involving descending-sigmoid colon. No radiographic signs of associated inflammation. No evidence of small bowel obstruction. Appendix: Visualized portion appendix normal. Intraperitoneal space: Unremarkable. No free air. No significant fluid collection. Vasculature: Unremarkable. No abdominal aortic aneurysm. Lymph nodes: Unremarkable. No enlarged lymph nodes. Urinary bladder: See \"Kidneys and ureters\" finding. Reproductive: Prostate appears within normal limits. Bones/joints: Chronic degenerative changes of the lumbar spine. Soft tissues: Increased fat in bilateral inguinal canals- small inguinal hernias.  Ovoid 3 cm lipoma within the left posterior abdominal wall musculature.    Impression: IMPRESSION: 1.   No calcified stones within the kidneys, ureters or bladder. No hydronephrosis or hydroureter. 2.   Mild colonic diverticulosis particularly involving descending-sigmoid colon. No radiographic signs of associated inflammation. 3.   No evidence of small bowel obstruction.     Procedure: XR chest portable    Result Date: 5/31/2024  Narrative: STUDY:  XR CHEST 1 VW PORTABLE INDICATION: Shortness of breath.   COMPARISON: 12/3/2023 TECHNIQUE:  Portable frontal view. FINDINGS: Mild bibasilar patchy opacities are seen either atelectasis or infiltrate or superimposed vasculature. The cardiomediastinal configuration is within normal limits. No evidence of pulmonary edema.    Impression: IMPRESSION:   Mild bibasilar patchy opacities either atelectasis or infiltrate or superimposed vasculature. " Workstation:[id number]

## 2024-06-28 ENCOUNTER — OFFICE VISIT (OUTPATIENT)
Dept: INTERNAL MEDICINE CLINIC | Facility: CLINIC | Age: 59
End: 2024-06-28
Payer: COMMERCIAL

## 2024-06-28 VITALS
DIASTOLIC BLOOD PRESSURE: 80 MMHG | OXYGEN SATURATION: 97 % | HEART RATE: 67 BPM | HEIGHT: 73 IN | RESPIRATION RATE: 17 BRPM | WEIGHT: 255 LBS | BODY MASS INDEX: 33.8 KG/M2 | SYSTOLIC BLOOD PRESSURE: 124 MMHG

## 2024-06-28 DIAGNOSIS — N52.9 ERECTILE DYSFUNCTION, UNSPECIFIED ERECTILE DYSFUNCTION TYPE: ICD-10-CM

## 2024-06-28 DIAGNOSIS — R73.03 PREDIABETES: ICD-10-CM

## 2024-06-28 DIAGNOSIS — Z90.09 HISTORY OF LOBECTOMY OF THYROID: Primary | ICD-10-CM

## 2024-06-28 DIAGNOSIS — E66.09 CLASS 1 OBESITY DUE TO EXCESS CALORIES WITH SERIOUS COMORBIDITY AND BODY MASS INDEX (BMI) OF 33.0 TO 33.9 IN ADULT: ICD-10-CM

## 2024-06-28 DIAGNOSIS — R39.12 WEAK URINARY STREAM: ICD-10-CM

## 2024-06-28 PROBLEM — E66.811 CLASS 1 OBESITY DUE TO EXCESS CALORIES WITH SERIOUS COMORBIDITY AND BODY MASS INDEX (BMI) OF 33.0 TO 33.9 IN ADULT: Status: ACTIVE | Noted: 2024-06-28

## 2024-06-28 PROBLEM — E04.9 ENLARGED THYROID: Status: RESOLVED | Noted: 2023-07-20 | Resolved: 2024-06-28

## 2024-06-28 PROCEDURE — 99214 OFFICE O/P EST MOD 30 MIN: CPT | Performed by: INTERNAL MEDICINE

## 2024-06-28 RX ORDER — TADALAFIL 10 MG/1
10 TABLET ORAL DAILY PRN
Qty: 30 TABLET | Refills: 0 | Status: SHIPPED | OUTPATIENT
Start: 2024-06-28 | End: 2024-07-02

## 2024-06-28 NOTE — ASSESSMENT & PLAN NOTE
01/14/20 0900   Team Meeting   Meeting Type Daily Rounds   Team Members Present   Team Members Present Physician;Nurse;; Other (Discipline and Name)   Physician Team Member Dr Aaron Curtis Team Member Radha Nance RN   Care Management Team Member Brenda Cancino   Other (Discipline and Name) Emerson Simon LCSW     Patient attended [facility] yesterday  Slept  5/2024, Thyroid mass- s/p left hemithyroidectomy.  Final pathology suggests benign thyroid nodules. There is no evidence of malignancy.

## 2024-06-28 NOTE — PROGRESS NOTES
Ambulatory Visit  Name: Agusto Riojas      : 1965      MRN: [id number]  Encounter Provider: Deon Mcadams MD  Encounter Date: 2024   Encounter department: [facility] INTERNAL MEDICINE [place]    Assessment & Plan     1. History of lobectomy of thyroid  Assessment & Plan:  2024, Thyroid mass- s/p left hemithyroidectomy.  Final pathology suggests benign thyroid nodules. There is no evidence of malignancy.   2. Erectile dysfunction, unspecified erectile dysfunction type  -     tadalafil (CIALIS) 10 MG tablet; Take 1 tablet (10 mg total) by mouth daily as needed for erectile dysfunction PRN  3. Prediabetes  -     Semaglutide-Weight Management (WEGOVY) 0.25 MG/0.5ML; Inject 0.5 mL (0.25 mg total) under the skin once a week for 12 doses  4. Class 1 obesity due to excess calories with serious comorbidity and body mass index (BMI) of 33.0 to 33.9 in adult  Assessment & Plan:  Discussed diet and exercise. Start wegovy 0.25 mg.  Orders:  -     Semaglutide-Weight Management (WEGOVY) 0.25 MG/0.5ML; Inject 0.5 mL (0.25 mg total) under the skin once a week for 12 doses  5. Weak urinary stream  -     Ambulatory Referral to Urology; Future      Depression Screening and Follow-up Plan: Clincally patient does not have depression. No treatment is required.       History of Present Illness     Patient is here for routine follow up, reviewed chronic medical problems, ordered labs for next visit including CBC CMP TSH A1C LIPID. Reviewed labs for this visit.        Review of Systems   Constitutional:  Negative for chills and fever.   HENT:  Negative for ear pain and sore throat.    Eyes:  Negative for pain and visual disturbance.   Respiratory:  Negative for cough and shortness of breath.    Cardiovascular:  Negative for chest pain and palpitations.   Gastrointestinal:  Negative for abdominal pain and vomiting.   Genitourinary:  Negative for dysuria and hematuria.   Musculoskeletal:  Negative for arthralgias  and back pain.   Skin:  Negative for color change and rash.   Neurological:  Negative for seizures and syncope.   All other systems reviewed and are negative.    Past Medical History   Past Medical History:   Diagnosis Date    Chronic kidney disease     Colon polyp     Depression     Epididymitis 8/18/22    Hypertension      Past Surgical History:   Procedure Laterality Date    COLONOSCOPY      COLONOSCOPY      SHOULDER SURGERY Left 10/2022    THYROID SURGERY  04/02/2024    US GUIDED THYROID BIOPSY  11/14/2023    WISDOM TOOTH EXTRACTION       Family History   Problem Relation Age of Onset    Hypertension Mother     Arthritis Mother     No Known Problems Father     Pancreatic cancer Sister     Cancer Sister     Other Sister         AIDS    Pancreatic cancer Brother     Cancer Brother     No Known Problems Brother     No Known Problems Brother      Current Outpatient Medications on File Prior to Visit   Medication Sig Dispense Refill    amLODIPine-benazepril (LOTREL) 10-40 MG per capsule Take 1 capsule by mouth daily 90 capsule 3    buPROPion (WELLBUTRIN XL) 150 mg 24 hr tablet Take 1 tablet (150 mg total) by mouth every morning 90 tablet 2    cloNIDine (CATAPRES-TTS-3) 0.3 mg/24 hr PLACE 1 PATCH (0.3 MG TOTAL) ON THE SKIN OVER 7 DAYS ONCE A WEEK 12 patch 4    dicyclomine (BENTYL) 20 mg tablet Take 1 tablet (20 mg total) by mouth every 6 (six) hours 36 tablet 1    multivitamin (THERAGRAN) TABS Take 1 tablet by mouth daily      NIFEdipine 0.3%-lidocaine 5% rectal ointment Apply 1 Application topically every 4 (four) hours as needed for discomfort or pain 10 g 0    Omega-3 Fatty Acids (fish oil) 1,000 mg Take 1,000 mg by mouth daily      traMADol (Ultram) 50 mg tablet Take 1 tablet (50 mg total) by mouth every 6 (six) hours as needed for moderate pain for up to 20 doses 20 tablet 0    [DISCONTINUED] tadalafil (CIALIS) 5 MG tablet Take 1 tablet (5 mg total) by mouth daily as needed for erectile dysfunction PRN       No  "current facility-administered medications on file prior to visit.   No Known Allergies   Objective     /80 (BP Location: Left arm, Patient Position: Sitting, Cuff Size: Adult)   Pulse 67   Resp 17   Ht 6' 1\" (1.854 m)   Wt 116 kg (255 lb)   SpO2 97%   BMI 33.64 kg/m²     Physical Exam  Vitals and nursing note reviewed.   Constitutional:       General: He is not in acute distress.     Appearance: He is well-developed.   HENT:      Head: Normocephalic and atraumatic.   Eyes:      Conjunctiva/sclera: Conjunctivae normal.   Cardiovascular:      Rate and Rhythm: Normal rate and regular rhythm.      Heart sounds: No murmur heard.  Pulmonary:      Effort: Pulmonary effort is normal. No respiratory distress.      Breath sounds: Normal breath sounds.   Abdominal:      Palpations: Abdomen is soft.      Tenderness: There is no abdominal tenderness.   Musculoskeletal:         General: No swelling.      Cervical back: Neck supple.   Skin:     General: Skin is warm and dry.      Capillary Refill: Capillary refill takes less than 2 seconds.   Neurological:      Mental Status: He is alert.   Psychiatric:         Mood and Affect: Mood normal.       Administrative Statements   I have spent a total time of 15 minutes on 06/28/24 In caring for this patient including Patient and family education, Impressions, and Documenting in the medical record.        "

## 2024-07-01 ENCOUNTER — TELEPHONE (OUTPATIENT)
Dept: GASTROENTEROLOGY | Facility: CLINIC | Age: 59
End: 2024-07-01

## 2024-07-01 NOTE — TELEPHONE ENCOUNTER
Called pt and LMOM with stool test result and to call us back if any questions.  Office # provided.

## 2024-07-01 NOTE — TELEPHONE ENCOUNTER
----- Message from Hunter Douglas DO sent at 6/30/2024 12:07 PM EDT -----  Patient has not read [employer] message. Please call and share results.    Agusto,     The stool test for ova and parasite was negative.

## 2024-07-02 ENCOUNTER — TELEPHONE (OUTPATIENT)
Dept: INTERNAL MEDICINE CLINIC | Facility: CLINIC | Age: 59
End: 2024-07-02

## 2024-07-02 ENCOUNTER — TELEPHONE (OUTPATIENT)
Age: 59
End: 2024-07-02

## 2024-07-02 DIAGNOSIS — N52.9 ERECTILE DYSFUNCTION, UNSPECIFIED ERECTILE DYSFUNCTION TYPE: Primary | ICD-10-CM

## 2024-07-02 RX ORDER — SILDENAFIL 50 MG/1
50 TABLET, FILM COATED ORAL DAILY PRN
Qty: 10 TABLET | Refills: 0 | Status: SHIPPED | OUTPATIENT
Start: 2024-07-02

## 2024-07-02 NOTE — TELEPHONE ENCOUNTER
Pt called to say that his insurance wont cover the cialis for him, if theres an alt to use instead?    Also the wegovy needs preauth, so it may be a work in progress?   Or preauth please if not he asks

## 2024-07-10 NOTE — TELEPHONE ENCOUNTER
Wife called stating she spoke with [employer] and they said they never received the prior authorization request for wegovy.    Please advise

## 2024-07-11 NOTE — TELEPHONE ENCOUNTER
Wife called back again today stating she spoke with [employer] and they denied pt's wegovy. Stated she was told something was missing and the prior auth needs to be resubmitted.  Wife said she has the same coverage and her wegovy was approved.

## 2024-07-11 NOTE — TELEPHONE ENCOUNTER
PA for Sildenafil Citrate 50MG tablets    Submitted via    [x]CMM-KEY [id number]  [][employer]-Case ID #   []Faxed to plan   []Other website   []Phone call Case ID #     Office notes sent, clinical questions answered. Awaiting determination    Turnaround time for your insurance to make a decision on your Prior Authorization can take 7-21 business days.

## 2024-07-11 NOTE — TELEPHONE ENCOUNTER
PA for wegovy 0.25mg    Submitted via    [x]CMM-KEY [id number]  [][employer]s-Case ID #   []Faxed to plan   []Other website   []Phone call Case ID #     Office notes sent, clinical questions answered. Awaiting determination    Turnaround time for your insurance to make a decision on your Prior Authorization can take 7-21 business days.

## 2024-07-12 NOTE — TELEPHONE ENCOUNTER
PA for wegovy 0.25mg Approved     Date(s) approved 07/11/2024-01/11/2025    Case #    Patient advised by          [x] MyChart Message  [] Phone call   []LMOM  []L/M to call office as no active Communication consent on file  []Unable to leave detailed message as VM not approved on Communication consent       Pharmacy advised by    [x]Fax  []Phone call    Approval letter scanned into Media Yes

## 2024-07-12 NOTE — TELEPHONE ENCOUNTER
PA for Sildenafil Citrate 50MG tablets  Denied    Reason:(Screenshot if applicable)        Message sent to office clinical pool Yes    Denial letter scanned into Media Yes    Appeal started No ( Provider will need to decide if appeal is warranted and send clinical documentation to Prior Authorization Team for initiation.)    **Please follow up with your patient regarding denial and next steps**

## 2024-08-13 ENCOUNTER — OFFICE VISIT (OUTPATIENT)
Dept: UROLOGY | Facility: CLINIC | Age: 59
End: 2024-08-13
Payer: COMMERCIAL

## 2024-08-13 VITALS
WEIGHT: 261 LBS | DIASTOLIC BLOOD PRESSURE: 87 MMHG | HEIGHT: 73 IN | TEMPERATURE: 97.5 F | OXYGEN SATURATION: 97 % | SYSTOLIC BLOOD PRESSURE: 132 MMHG | BODY MASS INDEX: 34.59 KG/M2 | HEART RATE: 87 BPM

## 2024-08-13 DIAGNOSIS — N40.1 BENIGN PROSTATIC HYPERPLASIA WITH LOWER URINARY TRACT SYMPTOMS, SYMPTOM DETAILS UNSPECIFIED: Primary | ICD-10-CM

## 2024-08-13 DIAGNOSIS — Z12.5 PROSTATE CANCER SCREENING: ICD-10-CM

## 2024-08-13 DIAGNOSIS — R39.12 WEAK URINARY STREAM: ICD-10-CM

## 2024-08-13 LAB
BACTERIA UR QL AUTO: ABNORMAL /HPF
BILIRUB UR QL STRIP: NEGATIVE
CLARITY UR: CLEAR
COLOR UR: ABNORMAL
GLUCOSE UR STRIP-MCNC: NEGATIVE MG/DL
HGB UR QL STRIP.AUTO: ABNORMAL
KETONES UR STRIP-MCNC: NEGATIVE MG/DL
LEUKOCYTE ESTERASE UR QL STRIP: NEGATIVE
MUCOUS THREADS UR QL AUTO: ABNORMAL
NITRITE UR QL STRIP: NEGATIVE
NON-SQ EPI CELLS URNS QL MICRO: ABNORMAL /HPF
PH UR STRIP.AUTO: 6 [PH]
POST-VOID RESIDUAL VOLUME, ML POC: 11 ML
PROT UR STRIP-MCNC: ABNORMAL MG/DL
RBC #/AREA URNS AUTO: ABNORMAL /HPF
SL AMB  POCT GLUCOSE, UA: NORMAL
SL AMB LEUKOCYTE ESTERASE,UA: NORMAL
SL AMB POCT BILIRUBIN,UA: NORMAL
SL AMB POCT BLOOD,UA: NORMAL
SL AMB POCT CLARITY,UA: CLEAR
SL AMB POCT COLOR,UA: YELLOW
SL AMB POCT KETONES,UA: NORMAL
SL AMB POCT NITRITE,UA: NORMAL
SL AMB POCT PH,UA: 5
SL AMB POCT SPECIFIC GRAVITY,UA: 1.02
SL AMB POCT URINE PROTEIN: NORMAL
SL AMB POCT UROBILINOGEN: 0.2
SP GR UR STRIP.AUTO: 1.02 (ref 1–1.03)
UROBILINOGEN UR STRIP-ACNC: <2 MG/DL
WBC #/AREA URNS AUTO: ABNORMAL /HPF

## 2024-08-13 PROCEDURE — 51798 US URINE CAPACITY MEASURE: CPT | Performed by: PHYSICIAN ASSISTANT

## 2024-08-13 PROCEDURE — 99214 OFFICE O/P EST MOD 30 MIN: CPT | Performed by: PHYSICIAN ASSISTANT

## 2024-08-13 PROCEDURE — 81002 URINALYSIS NONAUTO W/O SCOPE: CPT | Performed by: PHYSICIAN ASSISTANT

## 2024-08-13 PROCEDURE — 81001 URINALYSIS AUTO W/SCOPE: CPT | Performed by: PHYSICIAN ASSISTANT

## 2024-08-13 RX ORDER — TAMSULOSIN HYDROCHLORIDE 0.4 MG/1
0.4 CAPSULE ORAL
Qty: 30 CAPSULE | Refills: 2 | Status: SHIPPED | OUTPATIENT
Start: 2024-08-13

## 2024-08-13 NOTE — PROGRESS NOTES
8/13/2024      Chief Complaint   Patient presents with    Follow-up         Assessment and Plan    BPH with LUTS  - Will trial Flomax  - PVR 11 mL     2.  Prostate cancer screening  - Obtain updated PSA. Last PSA on file from 2022 was 1.9  - SALONI negative    3. History of right epididymoorchitis  - Asymptomatic, no recurrent issues     - Urine dip today + blood and leuks. Negative nitrites. Sent out for UA micro. If >3 RBCs per hpf, recommend hematuria work-up     - F/u in 6-8 weeks for reassessment.     History of Present Illness  Agusto Riojas is a 59 y.o. male here for follow up evaluation of new issue of weak urinary stream. Last seen by our office in 2022 for epididymitis. He denies any testicular pain or recurrent  infections since that time. His main complaint today is weak and slow urinary stream. Also some urinary frequency and nocturia. No dysuria or hematuria. Denies any prior  surgical manipulation. No family history of  malignancy. No smoking history.     CT renal stone study from 5/31/24 - no urologic abnormalities     AUA SYMPTOM SCORE      Flowsheet Row Most Recent Value   AUA SYMPTOM SCORE    How often have you had a sensation of not emptying your bladder completely after you finished urinating? 2 (P)     How often have you had to urinate again less than two hours after you finished urinating? 3 (P)     How often have you found you stopped and started again several times when you urinate? 4 (P)     How often have you found it difficult to postpone urination? 5 (P)     How often have you had a weak urinary stream? 4 (P)     How often have you had to push or strain to begin urination? 4 (P)     How many times did you most typically get up to urinate from the time you went to bed at night until the time you got up in the morning? 4 (P)     Quality of Life: If you were to spend the rest of your life with your urinary condition just the way it is now, how would you feel about that? 5 (P)     AUA  SYMPTOM SCORE 26 (P)                Review of Systems   Constitutional:  Negative for chills and fever.   Respiratory:  Negative for shortness of breath.    Cardiovascular:  Negative for chest pain.   Gastrointestinal:  Negative for abdominal pain.   Genitourinary:  Positive for difficulty urinating and frequency. Negative for dysuria, flank pain, hematuria and urgency.   Neurological:  Negative for dizziness.                  Past Medical History  Past Medical History:   Diagnosis Date    Chronic kidney disease     Colon polyp     Depression     Epididymitis 8/18/22    Hypertension        Past Social History  Past Surgical History:   Procedure Laterality Date    COLONOSCOPY      COLONOSCOPY      SHOULDER SURGERY Left 10/2022    THYROID SURGERY  04/02/2024    US GUIDED THYROID BIOPSY  11/14/2023    WISDOM TOOTH EXTRACTION       Social History     Tobacco Use   Smoking Status Never    Passive exposure: Never   Smokeless Tobacco Never       Past Family History  Family History   Problem Relation Age of Onset    Hypertension Mother     Arthritis Mother     No Known Problems Father     Pancreatic cancer Sister     Cancer Sister     Other Sister         AIDS    Pancreatic cancer Brother     Cancer Brother     No Known Problems Brother     No Known Problems Brother        Past Social history  Social History     Socioeconomic History    Marital status: /Civil Union     Spouse name: Not on file    Number of children: Not on file    Years of education: Not on file    Highest education level: Not on file   Occupational History    Not on file   Tobacco Use    Smoking status: Never     Passive exposure: Never    Smokeless tobacco: Never   Vaping Use    Vaping status: Never Used   Substance and Sexual Activity    Alcohol use: Yes     Alcohol/week: 1.0 standard drink of alcohol     Types: 1 Standard drinks or equivalent per week     Comment: Once a week    Drug use: Never    Sexual activity: Yes     Partners: Female   Other  Topics Concern    Not on file   Social History Narrative    Not on file     Social Determinants of Health     Financial Resource Strain: Low Risk  (4/2/2024)    Received from [facility], [facility]    Overall Financial Resource Strain (CARDIA)     Difficulty of Paying Living Expenses: Not hard at all   Food Insecurity: No Food Insecurity (4/2/2024)    Received from [facility], [facility]    Hunger Vital Sign     Worried About Running Out of Food in the Last Year: Never true     Ran Out of Food in the Last Year: Never true   Transportation Needs: No Transportation Needs (4/2/2024)    Received from [facility], [facility]    PRAPARE - Transportation     Lack of Transportation (Medical): No     Lack of Transportation (Non-Medical): No   Physical Activity: Not on file   Stress: Not on file   Social Connections: Not on file   Intimate Partner Violence: Not At Risk (4/2/2024)    Received from [facility], [facility]    Humiliation, Afraid, Rape, and Kick questionnaire     Fear of Current or Ex-Partner: No     Emotionally Abused: No     Physically Abused: No     Sexually Abused: No   Housing Stability: Low Risk  (4/2/2024)    Received from [facility], [facility]    Housing Stability Vital Sign     Unable to Pay for Housing in the Last Year: No     Number of Places Lived in the Last Year: 1     Unstable Housing in the Last Year: No       Current Medications  Current Outpatient Medications   Medication Sig Dispense Refill    amLODIPine-benazepril (LOTREL) 10-40 MG per capsule Take 1 capsule by mouth daily 90 capsule 3    buPROPion (WELLBUTRIN XL) 150 mg 24 hr tablet Take 1 tablet (150 mg total) by mouth every morning 90 tablet 2    dicyclomine (BENTYL) 20 mg tablet Take 1 tablet (20 mg total) by mouth every 6 (six) hours 36 tablet 1    multivitamin  "(THERAGRAN) TABS Take 1 tablet by mouth daily      NIFEdipine 0.3%-lidocaine 5% rectal ointment Apply 1 Application topically every 4 (four) hours as needed for discomfort or pain 10 g 0    Omega-3 Fatty Acids (fish oil) 1,000 mg Take 1,000 mg by mouth daily      Semaglutide-Weight Management (WEGOVY) 0.25 MG/0.5ML Inject 0.5 mL (0.25 mg total) under the skin once a week for 12 doses 2 mL 2    sildenafil (VIAGRA) 50 MG tablet Take 1 tablet (50 mg total) by mouth daily as needed for erectile dysfunction 10 tablet 0    traMADol (Ultram) 50 mg tablet Take 1 tablet (50 mg total) by mouth every 6 (six) hours as needed for moderate pain for up to 20 doses 20 tablet 0    cloNIDine (CATAPRES-TTS-3) 0.3 mg/24 hr PLACE 1 PATCH (0.3 MG TOTAL) ON THE SKIN OVER 7 DAYS ONCE A WEEK 12 patch 4     No current facility-administered medications for this visit.       Allergies  No Known Allergies      The following portions of the patient's history were reviewed and updated as appropriate: allergies, current medications, past medical history, past social history, past surgical history and problem list.      Vitals  Vitals:    08/13/24 1048   BP: 132/87   BP Location: Left arm   Patient Position: Sitting   Cuff Size: Large   Pulse: 87   Temp: 97.5 °F (36.4 °C)   TempSrc: Temporal   SpO2: 97%   Weight: 118 kg (261 lb)   Height: 6' 1\" (1.854 m)           Physical Exam  Physical Exam  Constitutional:       Appearance: Normal appearance.   HENT:      Head: Normocephalic and atraumatic.      Right Ear: External ear normal.      Left Ear: External ear normal.      Nose: Nose normal.   Eyes:      General: No scleral icterus.     Conjunctiva/sclera: Conjunctivae normal.   Cardiovascular:      Pulses: Normal pulses.   Pulmonary:      Effort: Pulmonary effort is normal.   Genitourinary:     Prostate: Not tender and no nodules present.   Musculoskeletal:         General: Normal range of motion.      Cervical back: Normal range of motion.   Skin:   "   General: Skin is warm and dry.   Neurological:      General: No focal deficit present.      Mental Status: He is alert and oriented to person, place, and time.   Psychiatric:         Mood and Affect: Mood normal.         Behavior: Behavior normal.         Thought Content: Thought content normal.         Judgment: Judgment normal.           Results  Recent Results (from the past 1 hour(s))   POCT Measure PVR    Collection Time: 08/13/24 10:50 AM   Result Value Ref Range    POST-VOID RESIDUAL VOLUME, ML POC 11 mL   POCT urine dip    Collection Time: 08/13/24 10:52 AM   Result Value Ref Range    LEUKOCYTE ESTERASE,UA +     NITRITE,UA -     SL AMB POCT UROBILINOGEN 0.2     POCT URINE PROTEIN +      PH,UA 5.0     BLOOD,UA +     SPECIFIC GRAVITY,UA 1.025     KETONES,UA -     BILIRUBIN,UA -     GLUCOSE, UA -      COLOR,UA yellow     CLARITY,UA clear    ]  Lab Results   Component Value Date    PSA 1.9 05/28/2022     Lab Results   Component Value Date    CALCIUM 9.0 06/10/2024    K 4.2 06/10/2024    CO2 26 06/10/2024     06/10/2024    BUN 19 06/10/2024    CREATININE 1.40 (H) 06/10/2024     Lab Results   Component Value Date    WBC 5.51 06/10/2024    HGB 13.8 06/10/2024    HCT 44.5 06/10/2024    MCV 95 06/10/2024     06/10/2024           Orders  Orders Placed This Encounter   Procedures    POCT urine dip    POCT Measure PVR     Lauren Peñaloza

## 2024-08-14 ENCOUNTER — TELEPHONE (OUTPATIENT)
Dept: UROLOGY | Facility: CLINIC | Age: 59
End: 2024-08-14

## 2024-08-14 NOTE — TELEPHONE ENCOUNTER
Spoke with pt informing their UA micro came back negative for RBC's. Pt verbalized understanding. No further assistance     ----- Message from Lauren Peñaloza PA-C sent at 8/14/2024  1:05 PM EDT -----  UA micro negative for RBCs

## 2024-09-05 DIAGNOSIS — R39.12 WEAK URINARY STREAM: ICD-10-CM

## 2024-09-05 DIAGNOSIS — N40.1 BENIGN PROSTATIC HYPERPLASIA WITH LOWER URINARY TRACT SYMPTOMS, SYMPTOM DETAILS UNSPECIFIED: ICD-10-CM

## 2024-09-05 RX ORDER — TAMSULOSIN HYDROCHLORIDE 0.4 MG/1
0.4 CAPSULE ORAL
Qty: 90 CAPSULE | Refills: 1 | Status: SHIPPED | OUTPATIENT
Start: 2024-09-05

## 2024-09-18 ENCOUNTER — TELEPHONE (OUTPATIENT)
Dept: UROLOGY | Facility: CLINIC | Age: 59
End: 2024-09-18

## 2024-09-18 NOTE — TELEPHONE ENCOUNTER
Called and spoke to the PT. PSA is needed PTV. PT verbalized understanding and also confirmed appt with Lauren CAT for 9/24/25.

## 2024-09-24 ENCOUNTER — TELEPHONE (OUTPATIENT)
Dept: UROLOGY | Facility: CLINIC | Age: 59
End: 2024-09-24

## 2024-09-24 ENCOUNTER — OFFICE VISIT (OUTPATIENT)
Dept: UROLOGY | Facility: CLINIC | Age: 59
End: 2024-09-24

## 2024-09-24 VITALS
WEIGHT: 264 LBS | TEMPERATURE: 97.6 F | SYSTOLIC BLOOD PRESSURE: 128 MMHG | DIASTOLIC BLOOD PRESSURE: 86 MMHG | HEIGHT: 73 IN | HEART RATE: 72 BPM | BODY MASS INDEX: 34.99 KG/M2 | OXYGEN SATURATION: 98 %

## 2024-09-24 DIAGNOSIS — N40.1 BENIGN PROSTATIC HYPERPLASIA WITH LOWER URINARY TRACT SYMPTOMS, SYMPTOM DETAILS UNSPECIFIED: Primary | ICD-10-CM

## 2024-09-24 DIAGNOSIS — R39.12 WEAK URINARY STREAM: ICD-10-CM

## 2024-09-24 LAB — POST-VOID RESIDUAL VOLUME, ML POC: 11 ML

## 2024-09-24 PROCEDURE — 99213 OFFICE O/P EST LOW 20 MIN: CPT | Performed by: PHYSICIAN ASSISTANT

## 2024-09-24 PROCEDURE — 51798 US URINE CAPACITY MEASURE: CPT | Performed by: PHYSICIAN ASSISTANT

## 2024-09-24 RX ORDER — METHOCARBAMOL 750 MG/1
750 TABLET, FILM COATED ORAL 4 TIMES DAILY PRN
COMMUNITY
Start: 2024-09-04 | End: 2024-10-04

## 2024-09-24 RX ORDER — DULOXETIN HYDROCHLORIDE 30 MG/1
CAPSULE, DELAYED RELEASE ORAL
COMMUNITY
Start: 2024-09-10

## 2024-09-24 NOTE — PROGRESS NOTES
9/24/2024      Chief Complaint   Patient presents with    Follow-up         Assessment and Plan    BPH with LUTS  - Trialed Flomax. Not taking consistently, will increase compliance. He wishes to pursue further work-up with cystoscopy and TRUS at this time.   - PVR 11 mL     2.  Prostate cancer screening  - Obtain updated PSA as ordered. Last PSA on file from 2022 was 1.9  - SALONI negative at recent visit.       History of Present Illness  Agusto Riojas is a 59 y.o. male here for follow up evaluation of lower urinary tract symptoms. He was recently seen for complaints of weak urinary stream. Was started on Flomax. He reports he often will forget to take it. Continues to have ongoing bothersome symptoms.. Presents today for reassessment.     Review of Systems   Constitutional:  Negative for chills and fever.   Respiratory:  Negative for shortness of breath.    Cardiovascular:  Negative for chest pain.   Gastrointestinal:  Negative for abdominal pain.   Genitourinary:  Positive for difficulty urinating. Negative for dysuria, flank pain, frequency, hematuria and urgency.   Neurological:  Negative for dizziness.           AUA SYMPTOM SCORE      Flowsheet Row Most Recent Value   AUA SYMPTOM SCORE    How often have you had a sensation of not emptying your bladder completely after you finished urinating? 4 (P)     How often have you had to urinate again less than two hours after you finished urinating? 4 (P)     How often have you found you stopped and started again several times when you urinate? 4 (P)     How often have you found it difficult to postpone urination? 4 (P)     How often have you had a weak urinary stream? 4 (P)     How often have you had to push or strain to begin urination? 4 (P)     How many times did you most typically get up to urinate from the time you went to bed at night until the time you got up in the morning? 4 (P)     Quality of Life: If you were to spend the rest of your life with your urinary  condition just the way it is now, how would you feel about that? 5 (P)     AUA SYMPTOM SCORE 28 (P)                 Past Medical History  Past Medical History:   Diagnosis Date    Chronic kidney disease     Colon polyp     Depression     Epididymitis 8/18/22    Hypertension        Past Social History  Past Surgical History:   Procedure Laterality Date    COLONOSCOPY      COLONOSCOPY      SHOULDER SURGERY Left 10/2022    THYROID SURGERY  04/02/2024    US GUIDED THYROID BIOPSY  11/14/2023    WISDOM TOOTH EXTRACTION       Social History     Tobacco Use   Smoking Status Never    Passive exposure: Never   Smokeless Tobacco Never       Past Family History  Family History   Problem Relation Age of Onset    Hypertension Mother     Arthritis Mother     No Known Problems Father     Pancreatic cancer Sister     Cancer Sister     Other Sister         AIDS    Pancreatic cancer Brother     Cancer Brother     No Known Problems Brother     No Known Problems Brother        Past Social history  Social History     Socioeconomic History    Marital status: /Civil Union     Spouse name: Not on file    Number of children: Not on file    Years of education: Not on file    Highest education level: Not on file   Occupational History    Not on file   Tobacco Use    Smoking status: Never     Passive exposure: Never    Smokeless tobacco: Never   Vaping Use    Vaping status: Never Used   Substance and Sexual Activity    Alcohol use: Yes     Alcohol/week: 1.0 standard drink of alcohol     Types: 1 Standard drinks or equivalent per week     Comment: Once a week    Drug use: Never    Sexual activity: Yes     Partners: Female   Other Topics Concern    Not on file   Social History Narrative    Not on file     Social Determinants of Health     Financial Resource Strain: Low Risk  (4/2/2024)    Received from [facility], [facility]    Overall Financial Resource Strain (CARDIA)     Difficulty of Paying Living  Expenses: Not hard at all   Food Insecurity: No Food Insecurity (4/2/2024)    Received from [facility], [facility]    Hunger Vital Sign     Worried About Running Out of Food in the Last Year: Never true     Ran Out of Food in the Last Year: Never true   Transportation Needs: No Transportation Needs (4/2/2024)    Received from [facility], [facility]    PRAPARE - Transportation     Lack of Transportation (Medical): No     Lack of Transportation (Non-Medical): No   Physical Activity: Not on file   Stress: Not on file   Social Connections: Not on file   Intimate Partner Violence: Not At Risk (4/2/2024)    [facility] from [facility], [facility]    Humiliation, Afraid, Rape, and Kick questionnaire     Fear of Current or Ex-Partner: No     Emotionally Abused: No     Physically Abused: No     Sexually Abused: No   Housing Stability: Low Risk  (4/2/2024)    Received from [facility], [facility]    Housing Stability Vital Sign     Unable to Pay for Housing in the Last Year: No     Number of Places Lived in the Last Year: 1     Unstable Housing in the Last Year: No       Current Medications  Current Outpatient Medications   Medication Sig Dispense Refill    amLODIPine-benazepril (LOTREL) 10-40 MG per capsule Take 1 capsule by mouth daily 90 capsule 3    buPROPion (WELLBUTRIN XL) 150 mg 24 hr tablet Take 1 tablet (150 mg total) by mouth every morning 90 tablet 2    cloNIDine (CATAPRES-TTS-3) 0.3 mg/24 hr PLACE 1 PATCH (0.3 MG TOTAL) ON THE SKIN OVER 7 DAYS ONCE A WEEK 12 patch 4    dicyclomine (BENTYL) 20 mg tablet Take 1 tablet (20 mg total) by mouth every 6 (six) hours 36 tablet 1    DULoxetine (CYMBALTA) 30 mg delayed release capsule PLEASE SEE ATTACHED FOR DETAILED DIRECTIONS      methocarbamol (ROBAXIN) 750 mg tablet Take 750 mg by mouth 4 (four) times a day as needed       "multivitamin (THERAGRAN) TABS Take 1 tablet by mouth daily      NIFEdipine 0.3%-lidocaine 5% rectal ointment Apply 1 Application topically every 4 (four) hours as needed for discomfort or pain 10 g 0    Omega-3 Fatty Acids (fish oil) 1,000 mg Take 1,000 mg by mouth daily      sildenafil (VIAGRA) 50 MG tablet Take 1 tablet (50 mg total) by mouth daily as needed for erectile dysfunction 10 tablet 0    tamsulosin (FLOMAX) 0.4 mg TAKE 1 CAPSULE BY MOUTH EVERY DAY WITH DINNER 90 capsule 1    traMADol (Ultram) 50 mg tablet Take 1 tablet (50 mg total) by mouth every 6 (six) hours as needed for moderate pain for up to 20 doses 20 tablet 0     No current facility-administered medications for this visit.       Allergies  No Known Allergies      The following portions of the patient's history were reviewed and updated as appropriate: allergies, current medications, past medical history, past social history, past surgical history and problem list.      Vitals  Vitals:    09/24/24 1036   BP: 128/86   Pulse: 72   Temp: 97.6 °F (36.4 °C)   TempSrc: Temporal   SpO2: 98%   Weight: 120 kg (264 lb)   Height: 6' 1\" (1.854 m)           Physical Exam  Physical Exam  Constitutional:       Appearance: Normal appearance.   HENT:      Head: Normocephalic and atraumatic.      Right Ear: External ear normal.      Left Ear: External ear normal.      Nose: Nose normal.   Eyes:      Conjunctiva/sclera: Conjunctivae normal.   Cardiovascular:      Pulses: Normal pulses.   Pulmonary:      Effort: Pulmonary effort is normal.   Musculoskeletal:         General: Normal range of motion.      Cervical back: Normal range of motion.   Neurological:      General: No focal deficit present.      Mental Status: He is alert and oriented to person, place, and time.   Psychiatric:         Mood and Affect: Mood normal.         Behavior: Behavior normal.         Thought Content: Thought content normal.         Judgment: Judgment normal.           Results  Recent " Results (from the past 1 hour(s))   POCT Measure PVR    Collection Time: 09/24/24 10:42 AM   Result Value Ref Range    POST-VOID RESIDUAL VOLUME, ML POC 11 mL   ]  Lab Results   Component Value Date    PSA 1.9 05/28/2022     Lab Results   Component Value Date    CALCIUM 9.0 06/10/2024    K 4.2 06/10/2024    CO2 26 06/10/2024     06/10/2024    BUN 19 06/10/2024    CREATININE 1.40 (H) 06/10/2024     Lab Results   Component Value Date    WBC 5.51 06/10/2024    HGB 13.8 06/10/2024    HCT 44.5 06/10/2024    MCV 95 06/10/2024     06/10/2024           Orders  Orders Placed This Encounter   Procedures    POCT Measure PVR       Lauren Peñaloza

## 2024-09-24 NOTE — TELEPHONE ENCOUNTER
Return for cystoscopy and TRUS next available. .  Patient does not wish to wait until March for [place] or May for [place].   Asking if he can be scheduled in [place] for a sooner appt. Due to new protocol , I did not schedule

## 2024-09-26 ENCOUNTER — APPOINTMENT (OUTPATIENT)
Dept: LAB | Facility: CLINIC | Age: 59
End: 2024-09-26
Payer: COMMERCIAL

## 2024-09-26 DIAGNOSIS — N18.31 STAGE 3A CHRONIC KIDNEY DISEASE (HCC): ICD-10-CM

## 2024-09-26 LAB
25(OH)D3 SERPL-MCNC: 25.3 NG/ML (ref 30–100)
ANION GAP SERPL CALCULATED.3IONS-SCNC: 9 MMOL/L (ref 4–13)
BASOPHILS # BLD AUTO: 0.02 THOUSANDS/ΜL (ref 0–0.1)
BASOPHILS NFR BLD AUTO: 0 % (ref 0–1)
BUN SERPL-MCNC: 19 MG/DL (ref 5–25)
CALCIUM SERPL-MCNC: 8.7 MG/DL (ref 8.4–10.2)
CHLORIDE SERPL-SCNC: 99 MMOL/L (ref 96–108)
CO2 SERPL-SCNC: 26 MMOL/L (ref 21–32)
CREAT SERPL-MCNC: 1.56 MG/DL (ref 0.6–1.3)
EOSINOPHIL # BLD AUTO: 0.11 THOUSAND/ΜL (ref 0–0.61)
EOSINOPHIL NFR BLD AUTO: 2 % (ref 0–6)
ERYTHROCYTE [DISTWIDTH] IN BLOOD BY AUTOMATED COUNT: 14.2 % (ref 11.6–15.1)
GFR SERPL CREATININE-BSD FRML MDRD: 47 ML/MIN/1.73SQ M
GLUCOSE P FAST SERPL-MCNC: 141 MG/DL (ref 65–99)
HCT VFR BLD AUTO: 44.2 % (ref 36.5–49.3)
HGB BLD-MCNC: 14.2 G/DL (ref 12–17)
IMM GRANULOCYTES # BLD AUTO: 0.03 THOUSAND/UL (ref 0–0.2)
IMM GRANULOCYTES NFR BLD AUTO: 0 % (ref 0–2)
LYMPHOCYTES # BLD AUTO: 1.63 THOUSANDS/ΜL (ref 0.6–4.47)
LYMPHOCYTES NFR BLD AUTO: 23 % (ref 14–44)
MCH RBC QN AUTO: 30 PG (ref 26.8–34.3)
MCHC RBC AUTO-ENTMCNC: 32.1 G/DL (ref 31.4–37.4)
MCV RBC AUTO: 93 FL (ref 82–98)
MONOCYTES # BLD AUTO: 0.68 THOUSAND/ΜL (ref 0.17–1.22)
MONOCYTES NFR BLD AUTO: 10 % (ref 4–12)
NEUTROPHILS # BLD AUTO: 4.67 THOUSANDS/ΜL (ref 1.85–7.62)
NEUTS SEG NFR BLD AUTO: 65 % (ref 43–75)
NRBC BLD AUTO-RTO: 0 /100 WBCS
PHOSPHATE SERPL-MCNC: 3.1 MG/DL (ref 2.3–4.1)
PLATELET # BLD AUTO: 227 THOUSANDS/UL (ref 149–390)
PMV BLD AUTO: 11.3 FL (ref 8.9–12.7)
POTASSIUM SERPL-SCNC: 4.7 MMOL/L (ref 3.5–5.3)
PTH-INTACT SERPL-MCNC: 63.9 PG/ML (ref 12–88)
RBC # BLD AUTO: 4.74 MILLION/UL (ref 3.88–5.62)
SODIUM SERPL-SCNC: 134 MMOL/L (ref 135–147)
WBC # BLD AUTO: 7.14 THOUSAND/UL (ref 4.31–10.16)

## 2024-09-26 PROCEDURE — 84100 ASSAY OF PHOSPHORUS: CPT

## 2024-09-26 PROCEDURE — 80048 BASIC METABOLIC PNL TOTAL CA: CPT

## 2024-09-26 PROCEDURE — 85025 COMPLETE CBC W/AUTO DIFF WBC: CPT

## 2024-09-26 PROCEDURE — 83970 ASSAY OF PARATHORMONE: CPT

## 2024-09-26 PROCEDURE — 82306 VITAMIN D 25 HYDROXY: CPT

## 2024-09-26 PROCEDURE — 36415 COLL VENOUS BLD VENIPUNCTURE: CPT

## 2024-09-27 ENCOUNTER — HOSPITAL ENCOUNTER (OUTPATIENT)
Dept: CT IMAGING | Facility: CLINIC | Age: 59
Discharge: HOME | End: 2024-09-27

## 2024-09-27 ENCOUNTER — OFFICE VISIT (OUTPATIENT)
Dept: INTERNAL MEDICINE CLINIC | Facility: CLINIC | Age: 59
End: 2024-09-27
Payer: COMMERCIAL

## 2024-09-27 VITALS
HEART RATE: 65 BPM | SYSTOLIC BLOOD PRESSURE: 114 MMHG | HEIGHT: 73 IN | WEIGHT: 264.2 LBS | BODY MASS INDEX: 35.02 KG/M2 | RESPIRATION RATE: 16 BRPM | OXYGEN SATURATION: 95 % | DIASTOLIC BLOOD PRESSURE: 70 MMHG

## 2024-09-27 DIAGNOSIS — E66.09 CLASS 1 OBESITY DUE TO EXCESS CALORIES WITH SERIOUS COMORBIDITY AND BODY MASS INDEX (BMI) OF 34.0 TO 34.9 IN ADULT: Primary | ICD-10-CM

## 2024-09-27 DIAGNOSIS — F32.1 CURRENT MODERATE EPISODE OF MAJOR DEPRESSIVE DISORDER WITHOUT PRIOR EPISODE (HCC): ICD-10-CM

## 2024-09-27 DIAGNOSIS — E78.2 MIXED HYPERLIPIDEMIA: ICD-10-CM

## 2024-09-27 DIAGNOSIS — E66.811 CLASS 1 OBESITY DUE TO EXCESS CALORIES WITH SERIOUS COMORBIDITY AND BODY MASS INDEX (BMI) OF 34.0 TO 34.9 IN ADULT: Primary | ICD-10-CM

## 2024-09-27 DIAGNOSIS — R73.03 PREDIABETES: ICD-10-CM

## 2024-09-27 DIAGNOSIS — R10.32 LEFT LOWER QUADRANT ABDOMINAL PAIN: ICD-10-CM

## 2024-09-27 DIAGNOSIS — K57.92 ACUTE DIVERTICULITIS: Primary | ICD-10-CM

## 2024-09-27 PROCEDURE — 99214 OFFICE O/P EST MOD 30 MIN: CPT | Performed by: INTERNAL MEDICINE

## 2024-09-27 PROCEDURE — 74177 CT ABD & PELVIS W/CONTRAST: CPT

## 2024-09-27 RX ADMIN — IOHEXOL 75 ML: 350 INJECTION, SOLUTION INTRAVENOUS at 14:11

## 2024-09-27 NOTE — PROGRESS NOTES
Ambulatory Visit  Name: Agusto Riojas      : 1965      MRN: [id number]  Encounter Provider: Deon Mcadams MD  Encounter Date: 2024   Encounter department: [facility] INTERNAL MEDICINE [place]    Assessment & Plan  Class 1 obesity due to excess calories with serious comorbidity and body mass index (BMI) of 34.0 to 34.9 in adult    Orders:  •  Semaglutide-Weight Management (WEGOVY) 0.25 MG/0.5ML; Inject 0.5 mL (0.25 mg total) under the skin once a week for 4 doses    Patient with obesity, BMI of 34. He has co morbidities including hypertension hyperlipidemia.  He has no contraindications to GLP 1 agonist therapy.  He states that he has been on a weight loss regimen of low-calorie diet, increased physical activity, and lifestyle modifications for a minimum of 6 months.  Prediabetes    Orders:  •  Hemoglobin A1C; Future    Current moderate episode of major depressive disorder without prior episode (HCC)         Continue Cymbalta.  Mixed hyperlipidemia    Orders:  •  Semaglutide-Weight Management (WEGOVY) 0.25 MG/0.5ML; Inject 0.5 mL (0.25 mg total) under the skin once a week for 4 doses  •  Lipid Panel with Direct LDL reflex; Future    The 10-year ASCVD risk score (Ana DK, et al., 2019) is: 11.2%    Values used to calculate the score:      Age: 59 years      Sex: Male      Is Non- : Yes      Diabetic: No      Tobacco smoker: No      Systolic Blood Pressure: 114 mmHg      Is BP treated: Yes      HDL Cholesterol: 46 mg/dL      Total Cholesterol: 177 mg/dL    Left lower quadrant abdominal pain    Orders:  •  CT abdomen pelvis w contrast; Future    Patient reports left lower quadrant abdominal pain times few weeks.  Pain has become severe in the last couple days.  Denies any nausea vomiting.  Reports alternating constipation.  Pain is to the lower abdomen.  History of diverticulosis on last CT.  He had a colonoscopy couple years ago revealing diverticulosis.  And a  couple polyps.  Obtain CAT scan.  Suspect this is constipation.  Discussed gas containing food.    Depression Screening and Follow-up Plan: Clincally patient does not have depression. No treatment is required.     History of Present Illness     Patient is here for routine follow up, reviewed chronic medical problems.      History obtained from : patient and patient's Significant Other  Review of Systems   Constitutional:  Negative for chills and fever.   HENT:  Negative for ear pain and sore throat.    Eyes:  Negative for pain and visual disturbance.   Respiratory:  Negative for cough and shortness of breath.    Cardiovascular:  Negative for chest pain and palpitations.   Gastrointestinal:  Positive for abdominal distention, abdominal pain and constipation. Negative for vomiting.        Increased gas   Genitourinary:  Negative for dysuria and hematuria.   Musculoskeletal:  Negative for arthralgias and back pain.   Skin:  Negative for color change and rash.   Neurological:  Negative for seizures and syncope.   All other systems reviewed and are negative.    Past Medical History   Past Medical History:   Diagnosis Date   • Chronic kidney disease    • Colon polyp    • Depression    • Epididymitis 8/18/22   • Hypertension      Past Surgical History:   Procedure Laterality Date   • COLONOSCOPY     • COLONOSCOPY     • SHOULDER SURGERY Left 10/2022   • THYROID SURGERY  04/02/2024   • US GUIDED THYROID BIOPSY  11/14/2023   • WISDOM TOOTH EXTRACTION       Family History   Problem Relation Age of Onset   • Hypertension Mother    • Arthritis Mother    • No Known Problems Father    • Pancreatic cancer Sister    • Cancer Sister    • Other Sister         AIDS   • Pancreatic cancer Brother    • Cancer Brother    • No Known Problems Brother    • No Known Problems Brother      Current Outpatient Medications on File Prior to Visit   Medication Sig Dispense Refill   • amLODIPine-benazepril (LOTREL) 10-40 MG per capsule Take 1 capsule  "by mouth daily 90 capsule 3   • buPROPion (WELLBUTRIN XL) 150 mg 24 hr tablet Take 1 tablet (150 mg total) by mouth every morning 90 tablet 2   • cloNIDine (CATAPRES-TTS-3) 0.3 mg/24 hr PLACE 1 PATCH (0.3 MG TOTAL) ON THE SKIN OVER 7 DAYS ONCE A WEEK 12 patch 4   • dicyclomine (BENTYL) 20 mg tablet Take 1 tablet (20 mg total) by mouth every 6 (six) hours 36 tablet 1   • DULoxetine (CYMBALTA) 30 mg delayed release capsule PLEASE SEE ATTACHED FOR DETAILED DIRECTIONS     • methocarbamol (ROBAXIN) 750 mg tablet Take 750 mg by mouth 4 (four) times a day as needed     • multivitamin (THERAGRAN) TABS Take 1 tablet by mouth daily     • NIFEdipine 0.3%-lidocaine 5% rectal ointment Apply 1 Application topically every 4 (four) hours as needed for discomfort or pain 10 g 0   • Omega-3 Fatty Acids (fish oil) 1,000 mg Take 1,000 mg by mouth daily     • sildenafil (VIAGRA) 50 MG tablet Take 1 tablet (50 mg total) by mouth daily as needed for erectile dysfunction 10 tablet 0   • tamsulosin (FLOMAX) 0.4 mg TAKE 1 CAPSULE BY MOUTH EVERY DAY WITH DINNER 90 capsule 1   • traMADol (Ultram) 50 mg tablet Take 1 tablet (50 mg total) by mouth every 6 (six) hours as needed for moderate pain for up to 20 doses 20 tablet 0     No current facility-administered medications on file prior to visit.   No Known Allergies       Objective     /70 (BP Location: Left arm, Patient Position: Sitting, Cuff Size: Adult)   Pulse 65   Resp 16   Ht 6' 1\" (1.854 m)   Wt 120 kg (264 lb 3.2 oz)   SpO2 95%   BMI 34.86 kg/m²     Physical Exam  Vitals and nursing note reviewed.   Constitutional:       General: He is not in acute distress.     Appearance: He is well-developed. He is obese.   HENT:      Head: Normocephalic and atraumatic.   Eyes:      Conjunctiva/sclera: Conjunctivae normal.   Cardiovascular:      Rate and Rhythm: Normal rate and regular rhythm.      Heart sounds: No murmur heard.  Pulmonary:      Effort: Pulmonary effort is normal. No " respiratory distress.      Breath sounds: Normal breath sounds.   Abdominal:      General: There is distension.      Palpations: Abdomen is soft.      Tenderness: There is abdominal tenderness.   Musculoskeletal:         General: No swelling.      Cervical back: Neck supple.   Skin:     General: Skin is warm and dry.      Capillary Refill: Capillary refill takes less than 2 seconds.   Neurological:      Mental Status: He is alert.   Psychiatric:         Mood and Affect: Mood normal.       I have spent a total time of 15 minutes in caring for this patient on the day of the visit/encounter including Instructions for management, Risk factor reductions, Counseling / Coordination of care, and Obtaining or reviewing history  .

## 2024-09-27 NOTE — ASSESSMENT & PLAN NOTE
Orders:    Semaglutide-Weight Management (WEGOVY) 0.25 MG/0.5ML; Inject 0.5 mL (0.25 mg total) under the skin once a week for 4 doses    Patient with obesity, BMI of 34. He has co morbidities including hypertension hyperlipidemia.  He has no contraindications to GLP 1 agonist therapy.  He states that he has been on a weight loss regimen of low-calorie diet, increased physical activity, and lifestyle modifications for a minimum of 6 months.

## 2024-10-08 NOTE — PROGRESS NOTES
"59M with BPH/LUTS    No AC    PSA 1.9 in 2022    LUTS: nocturia x2-3, freq q1-2 hours, occ straining, weak stream, intermittency, urgency  Denied GH, dysuria, leakage    Stated on Flomax summer 2024 but not taking consistently    PVR 11 cc on 9/24/24    CTAP w 9/27/24: no HUN BL; no stones; subcm hypoattenuating renal lesions; prostate personally measured at 3.74 x 4.96 x 4.03 cm, 38.88 g    PVR 9 cc on 10/9/24    U dip 10/9/24: -LE, -N, +B               Cystoscopy     Date/Time  10/9/2024 2:30 PM     Performed by  Jairo Wilkins DO   Authorized by  Jairo Wilkins DO     Universal Protocol:  procedure performed by consultantConsent: Verbal consent obtained. Written consent obtained.  Risks and benefits: risks, benefits and alternatives were discussed  Consent given by: patient  Time out: Immediately prior to procedure a \"time out\" was called to verify the correct patient, procedure, equipment, support staff and site/side marked as required.  Timeout called at: 10/9/2024 4:26 PM.  Patient understanding: patient states understanding of the procedure being performed  Patient consent: the patient's understanding of the procedure matches consent given  Procedure consent: procedure consent matches procedure scheduled  Relevant documents: relevant documents present and verified  Required items: required blood products, implants, devices, and special equipment available  Patient identity confirmed: verbally with patient      Procedure Details:  Procedure type: cystoscopy    Patient tolerance: Patient tolerated the procedure well with no immediate complications    Additional Procedure Details: Office Cystoscopy Procedure Note    Indication:     medically refractory lower urinary tract symptoms     Informed consent   The risks, benefits, complications, treatment options, and expected outcomes were discussed with the patient. The patient concurred with the proposed plan and provided informed " consent.    Anesthesia  Lidocaine jelly 2%    Antibiotic prophylaxis   None    Procedure  The patient was placed in the supineposition, was prepped and draped in the usual manner using sterile technique, and 2% lidocaine jelly instilled into the urethra.  A 17 F flexible cystoscope was then inserted into the urethra and the urethra and bladder carefully examined.  The following findings were noted:    Findings:  Urethra:  Normal  Prostate:  significant lateral lobe hypertrophy with large intravesical component, min median lobe, no lesions  Bladder:  Mod trabeculations; dome chimney; no lesions; no stones  Ureteral orifices:  orthotopic  Other findings:  None, retroflexed view confirms    Specimens: None                 Complications:    None; patient tolerated the procedure well           Disposition: To home            Condition: Stable              I explained to the patient that I measured his prostate about 40 g on the recent CT.  However, I explained to him that clearly there is a large intravesical component of his prostate that was not able to be appreciated on the CT.  I told him that I believe that his prostate is significantly larger, closer to 70 or 80 g.  Based on this size, we talked about surgical options, and based on the surgical options given the size of his prostate, he was most interested in the holmium laser nucleation of the prostate.            Patient is interested in proceeding with Holmium laser enucleation based on our discussion today.      Laser enucleation of the prostate offers a completely endoscopic approach (no incisions) to definitively remove prostate tissue obstructing the bladder with lower risk of bleeding complications compared to other endoscopic procedures, which is why I commonly offer Laser enucleation of the prostate.  Additional advantages of laser enucleation of the prostate include, very low risk of re-treatment over time, shorter hospital stay and in some cases no  hospital stay required, shorter indwelling Peres catheter times, and the safety of this treatment modality in complex patients on blood thinning medication (ex. Eliquis, Xarelto, Coumadin).      I explained a HoLEP is a surgery performed with special equipment through the urethra to remove obstructing prostate tissue that is causing urinary symptoms.  Usually a Peres catheter will be placed for less than 24 hours after surgery. Some patients may require hospitalization overnight with continuous bladder irrigation through the Peres catheter.  It is common to have irritative voiding symptoms such as urinary urgency/frequency/nocturia for several weeks following a HoLEP; it is also common to see blood in the urine during this time period. There is a risk of retrograde ejaculation in up to 75% of patients after HoLEP. The risk of erectile dysfunction is rare following a HoLEP. The risk of transient stress urinary incontinence lasting 3-6 months following surgery is between 10-30% based on the literature.  The risk of long term urinary incontinence is rare, 1-2%.  Additional side effects that we discussed today include urethral stricture and bladder neck contracture, which occurs in 1-2% of patients. The risk of needing to make a cystotomy or a bladder incision to retrieve the enucleated tissue in the event that the morcellator does not work was discussed. Other reasons why a cystotomy would have to be made include bladder injury from irrigation fluid used during the procedure or bladder injury from placement of a Peres catheter.     I also explained to the patient that proper voiding requires open bladder outlet as well as proper underlying bladder function. He understands that even if we create a nice opening in the prostate after surgery, he still may not ideally void due to poor bladder function. He does understand that sometimes underlying bladder voiding mechanics can improve once a proper outlet opening is  created, however, he also understands that this is not always the case. In summary the patient understands that even despite a proper HoLEP surgery, voiding issues may remain after the procedure.    We also discussed the possibility of finding prostate cancer pathology in the HoLEP specimen. I explained that all of the prostate tissue removed during the procedure gets examined by the pathology team, and there have been instances where prostate cancer is identified. I explained that while incidentally found prostate cancer is rare, it is usually low risk prostate cancer that can typically be monitored rather than aggressively treated. Should the patient have intermediate or high risk prostate cancer identified, he would have the risk based treatment options reviewed with him. I explained that in these scenarios, men are often treated with radiation.    The patient understands the risk and benefits of a HoLEP and would like to proceed with surgery.      I took time to answer any questions he had regarding the procedure.                  PLAN  -Fu UA micro. Will need CTU if pos for microhem.  -Fu PSA. If elevated may need additional work up.  -Consented for HoLEP  -PATs  -Ucx  -He wants to do surgery in Jan 2025 if possible

## 2024-10-09 ENCOUNTER — PROCEDURE VISIT (OUTPATIENT)
Dept: UROLOGY | Facility: CLINIC | Age: 59
End: 2024-10-09
Payer: COMMERCIAL

## 2024-10-09 VITALS
OXYGEN SATURATION: 96 % | HEIGHT: 73 IN | HEART RATE: 66 BPM | BODY MASS INDEX: 34.19 KG/M2 | DIASTOLIC BLOOD PRESSURE: 81 MMHG | WEIGHT: 258 LBS | TEMPERATURE: 98 F | SYSTOLIC BLOOD PRESSURE: 120 MMHG

## 2024-10-09 DIAGNOSIS — N40.1 BENIGN PROSTATIC HYPERPLASIA WITH LOWER URINARY TRACT SYMPTOMS, SYMPTOM DETAILS UNSPECIFIED: ICD-10-CM

## 2024-10-09 DIAGNOSIS — R39.12 WEAK URINARY STREAM: Primary | ICD-10-CM

## 2024-10-09 LAB
BACTERIA UR QL AUTO: ABNORMAL /HPF
BILIRUB UR QL STRIP: NEGATIVE
CLARITY UR: CLEAR
COLOR UR: ABNORMAL
GLUCOSE UR STRIP-MCNC: NEGATIVE MG/DL
HGB UR QL STRIP.AUTO: ABNORMAL
KETONES UR STRIP-MCNC: NEGATIVE MG/DL
LEUKOCYTE ESTERASE UR QL STRIP: NEGATIVE
MUCOUS THREADS UR QL AUTO: ABNORMAL
NITRITE UR QL STRIP: NEGATIVE
NON-SQ EPI CELLS URNS QL MICRO: ABNORMAL /HPF
PH UR STRIP.AUTO: 5.5 [PH]
POST-VOID RESIDUAL VOLUME, ML POC: 9 ML
PROT UR STRIP-MCNC: ABNORMAL MG/DL
RBC #/AREA URNS AUTO: ABNORMAL /HPF
SL AMB  POCT GLUCOSE, UA: NORMAL
SL AMB LEUKOCYTE ESTERASE,UA: NORMAL
SL AMB POCT BILIRUBIN,UA: NORMAL
SL AMB POCT BLOOD,UA: NORMAL
SL AMB POCT CLARITY,UA: CLEAR
SL AMB POCT COLOR,UA: YELLOW
SL AMB POCT KETONES,UA: NORMAL
SL AMB POCT NITRITE,UA: NORMAL
SL AMB POCT PH,UA: 5
SL AMB POCT SPECIFIC GRAVITY,UA: 1.02
SL AMB POCT URINE PROTEIN: NORMAL
SL AMB POCT UROBILINOGEN: 0.2
SP GR UR STRIP.AUTO: 1.02 (ref 1–1.03)
UROBILINOGEN UR STRIP-ACNC: <2 MG/DL
WBC #/AREA URNS AUTO: ABNORMAL /HPF

## 2024-10-09 PROCEDURE — 51798 US URINE CAPACITY MEASURE: CPT | Performed by: UROLOGY

## 2024-10-09 PROCEDURE — 81001 URINALYSIS AUTO W/SCOPE: CPT | Performed by: UROLOGY

## 2024-10-09 PROCEDURE — 81002 URINALYSIS NONAUTO W/O SCOPE: CPT | Performed by: UROLOGY

## 2024-10-09 PROCEDURE — 52000 CYSTOURETHROSCOPY: CPT | Performed by: UROLOGY

## 2024-10-09 RX ORDER — SODIUM CHLORIDE 9 MG/ML
75 INJECTION, SOLUTION INTRAVENOUS CONTINUOUS
OUTPATIENT
Start: 2024-10-09

## 2024-10-09 NOTE — PATIENT INSTRUCTIONS
You had cystoscopy done in the office today. This means that we looked inside your urethra and bladder with a camera.    You may see some blood in your urine for the next few days. This is normal. Please drink plenty of fluids. Call the office if you are passing large blood clots in your urine or if you are not able to urinate.    It may burn when you urinate for the next few days. This is normal.    Please call the office if you have fevers or chills in the next few days.    You will be getting a call from our surgical scheduler. She will help you set up a date for your upcoming procedure.

## 2024-10-10 ENCOUNTER — APPOINTMENT (OUTPATIENT)
Dept: LAB | Facility: HOSPITAL | Age: 59
End: 2024-10-10
Payer: COMMERCIAL

## 2024-10-10 DIAGNOSIS — N18.9 CHRONIC KIDNEY DISEASE-MINERAL AND BONE DISORDER: ICD-10-CM

## 2024-10-10 DIAGNOSIS — E83.9 CHRONIC KIDNEY DISEASE-MINERAL AND BONE DISORDER: ICD-10-CM

## 2024-10-10 DIAGNOSIS — N40.1 BENIGN PROSTATIC HYPERPLASIA WITH LOWER URINARY TRACT SYMPTOMS, SYMPTOM DETAILS UNSPECIFIED: ICD-10-CM

## 2024-10-10 DIAGNOSIS — N18.31 STAGE 3A CHRONIC KIDNEY DISEASE (HCC): ICD-10-CM

## 2024-10-10 DIAGNOSIS — M89.9 CHRONIC KIDNEY DISEASE-MINERAL AND BONE DISORDER: ICD-10-CM

## 2024-10-10 DIAGNOSIS — E78.2 MIXED HYPERLIPIDEMIA: ICD-10-CM

## 2024-10-10 DIAGNOSIS — R73.03 PREDIABETES: ICD-10-CM

## 2024-10-10 LAB
25(OH)D3 SERPL-MCNC: 27.4 NG/ML (ref 30–100)
ANION GAP SERPL CALCULATED.3IONS-SCNC: 7 MMOL/L (ref 4–13)
BACTERIA UR QL AUTO: ABNORMAL /HPF
BASOPHILS # BLD AUTO: 0.02 THOUSANDS/ΜL (ref 0–0.1)
BASOPHILS NFR BLD AUTO: 1 % (ref 0–1)
BILIRUB UR QL STRIP: NEGATIVE
BUN SERPL-MCNC: 13 MG/DL (ref 5–25)
CALCIUM SERPL-MCNC: 8.8 MG/DL (ref 8.4–10.2)
CHLORIDE SERPL-SCNC: 104 MMOL/L (ref 96–108)
CHOLEST SERPL-MCNC: 139 MG/DL
CLARITY UR: CLEAR
CO2 SERPL-SCNC: 26 MMOL/L (ref 21–32)
COLOR UR: YELLOW
CREAT SERPL-MCNC: 1.56 MG/DL (ref 0.6–1.3)
CREAT UR-MCNC: 343.4 MG/DL
EOSINOPHIL # BLD AUTO: 0.13 THOUSAND/ΜL (ref 0–0.61)
EOSINOPHIL NFR BLD AUTO: 4 % (ref 0–6)
ERYTHROCYTE [DISTWIDTH] IN BLOOD BY AUTOMATED COUNT: 13.4 % (ref 11.6–15.1)
EST. AVERAGE GLUCOSE BLD GHB EST-MCNC: 146 MG/DL
GFR SERPL CREATININE-BSD FRML MDRD: 47 ML/MIN/1.73SQ M
GLUCOSE P FAST SERPL-MCNC: 114 MG/DL (ref 65–99)
GLUCOSE UR STRIP-MCNC: NEGATIVE MG/DL
HBA1C MFR BLD: 6.7 %
HCT VFR BLD AUTO: 43.3 % (ref 36.5–49.3)
HDLC SERPL-MCNC: 35 MG/DL
HGB BLD-MCNC: 14.2 G/DL (ref 12–17)
HGB UR QL STRIP.AUTO: ABNORMAL
IMM GRANULOCYTES # BLD AUTO: 0.01 THOUSAND/UL (ref 0–0.2)
IMM GRANULOCYTES NFR BLD AUTO: 0 % (ref 0–2)
KETONES UR STRIP-MCNC: NEGATIVE MG/DL
LDLC SERPL CALC-MCNC: 89 MG/DL (ref 0–100)
LEUKOCYTE ESTERASE UR QL STRIP: NEGATIVE
LYMPHOCYTES # BLD AUTO: 1.51 THOUSANDS/ΜL (ref 0.6–4.47)
LYMPHOCYTES NFR BLD AUTO: 44 % (ref 14–44)
MCH RBC QN AUTO: 30.3 PG (ref 26.8–34.3)
MCHC RBC AUTO-ENTMCNC: 32.8 G/DL (ref 31.4–37.4)
MCV RBC AUTO: 92 FL (ref 82–98)
MONOCYTES # BLD AUTO: 0.24 THOUSAND/ΜL (ref 0.17–1.22)
MONOCYTES NFR BLD AUTO: 7 % (ref 4–12)
NEUTROPHILS # BLD AUTO: 1.53 THOUSANDS/ΜL (ref 1.85–7.62)
NEUTS SEG NFR BLD AUTO: 44 % (ref 43–75)
NITRITE UR QL STRIP: NEGATIVE
NON-SQ EPI CELLS URNS QL MICRO: ABNORMAL /HPF
NRBC BLD AUTO-RTO: 0 /100 WBCS
PH UR STRIP.AUTO: 5.5 [PH]
PHOSPHATE SERPL-MCNC: 3.1 MG/DL (ref 2.3–4.1)
PLATELET # BLD AUTO: 208 THOUSANDS/UL (ref 149–390)
PMV BLD AUTO: 9.5 FL (ref 8.9–12.7)
POTASSIUM SERPL-SCNC: 4.4 MMOL/L (ref 3.5–5.3)
PROT UR STRIP-MCNC: ABNORMAL MG/DL
PROT UR-MCNC: 56.4 MG/DL
PROT/CREAT UR: 0.2 MG/G{CREAT} (ref 0–0.1)
PSA SERPL-MCNC: 1.21 NG/ML (ref 0–4)
PTH-INTACT SERPL-MCNC: 65.1 PG/ML (ref 12–88)
RBC # BLD AUTO: 4.69 MILLION/UL (ref 3.88–5.62)
RBC #/AREA URNS AUTO: ABNORMAL /HPF
SODIUM SERPL-SCNC: 137 MMOL/L (ref 135–147)
SP GR UR STRIP.AUTO: 1.02 (ref 1–1.03)
TRIGL SERPL-MCNC: 74 MG/DL
UROBILINOGEN UR STRIP-ACNC: <2 MG/DL
WBC # BLD AUTO: 3.44 THOUSAND/UL (ref 4.31–10.16)
WBC #/AREA URNS AUTO: ABNORMAL /HPF

## 2024-10-10 PROCEDURE — 84100 ASSAY OF PHOSPHORUS: CPT

## 2024-10-10 PROCEDURE — 36415 COLL VENOUS BLD VENIPUNCTURE: CPT

## 2024-10-10 PROCEDURE — 82306 VITAMIN D 25 HYDROXY: CPT

## 2024-10-10 PROCEDURE — 85025 COMPLETE CBC W/AUTO DIFF WBC: CPT

## 2024-10-10 PROCEDURE — 83970 ASSAY OF PARATHORMONE: CPT

## 2024-10-10 PROCEDURE — 80048 BASIC METABOLIC PNL TOTAL CA: CPT

## 2024-10-10 PROCEDURE — 84153 ASSAY OF PSA TOTAL: CPT

## 2024-10-10 PROCEDURE — 80061 LIPID PANEL: CPT

## 2024-10-10 PROCEDURE — 83036 HEMOGLOBIN GLYCOSYLATED A1C: CPT

## 2024-10-11 ENCOUNTER — TELEPHONE (OUTPATIENT)
Dept: UROLOGY | Facility: CLINIC | Age: 59
End: 2024-10-11

## 2024-10-11 ENCOUNTER — PREP FOR PROCEDURE (OUTPATIENT)
Dept: UROLOGY | Facility: CLINIC | Age: 59
End: 2024-10-11

## 2024-10-11 DIAGNOSIS — Z01.812 PRE-OPERATIVE LABORATORY EXAMINATION: Primary | ICD-10-CM

## 2024-10-11 DIAGNOSIS — Z01.818 OTHER SPECIFIED PRE-OPERATIVE EXAMINATION: ICD-10-CM

## 2024-10-11 DIAGNOSIS — N40.1 BENIGN PROSTATIC HYPERPLASIA WITH LOWER URINARY TRACT SYMPTOMS, SYMPTOM DETAILS UNSPECIFIED: ICD-10-CM

## 2024-10-11 DIAGNOSIS — Z01.810 PRE-OPERATIVE CARDIOVASCULAR EXAMINATION: ICD-10-CM

## 2024-10-11 DIAGNOSIS — R39.89 SUSPECTED UTI: ICD-10-CM

## 2024-10-11 NOTE — TELEPHONE ENCOUNTER
Spoke with patient and confirmed surgery date of:02/03/25  Type of surgery:HOLEP  Operating physician: Dr. Wilkins  Location of surgery: [facility]    Verbally went over prep with patient on: 10/11/24  NPO  Bowel prep? No  Hospital calls afternoon prior with arrival time -Calls Friday afternoon for Monday surgeries  Patient needs ride to and from surgery (outpatient/inpatient)   Pre-op testing to be done 2 weeks prior to surgery  Cbc bmp t/s u/c EKG   Blood thinners:   Lpat will call a week prior with any holds  Clearances needed: none    Mailed/emailed to patient on:  Copy of packet scanned into Media on:  Labs in packet  Soap / Bowel prep in packet  Pre-op & Post-op in packet  Dates of H&P and post-op if needed    Consent: in Media

## 2024-10-13 DIAGNOSIS — E11.9 TYPE 2 DIABETES MELLITUS WITHOUT COMPLICATION, WITHOUT LONG-TERM CURRENT USE OF INSULIN (HCC): Primary | ICD-10-CM

## 2024-10-13 NOTE — ASSESSMENT & PLAN NOTE
Lab Results   Component Value Date    HGBA1C 6.7 (H) 10/10/2024       Orders:    POCT urine microalbumin/creatinine    IRIS Diabetic eye exam    semaglutide, 0.25 or 0.5 mg/dose, (Ozempic, 0.25 or 0.5 MG/DOSE,) 2 mg/3 mL injection pen; 0.25 mg under the skin every 7 days for 4 doses (28 days), THEN 0.5 mg under the skin every 7 days    Patient with new diagnosis of type 2 diabetes.  A1c 6.7.  Will try to get Ozempic approved.

## 2024-10-13 NOTE — PROGRESS NOTES
Ambulatory Visit  Name: Agusto Riojas      : 1965      MRN: [id number]  Encounter Provider: Deon Mcadams MD  Encounter Date: 10/14/2024   Encounter department: [facility] INTERNAL MEDICINE [place]    Assessment & Plan  Type 2 diabetes mellitus without complication, without long-term current use of insulin (HCC)    Lab Results   Component Value Date    HGBA1C 6.7 (H) 10/10/2024       Orders:    POCT urine microalbumin/creatinine    IRIS Diabetic eye exam    semaglutide, 0.25 or 0.5 mg/dose, (Ozempic, 0.25 or 0.5 MG/DOSE,) 2 mg/3 mL injection pen; 0.25 mg under the skin every 7 days for 4 doses (28 days), THEN 0.5 mg under the skin every 7 days    Patient with new diagnosis of type 2 diabetes.  A1c 6.7.  Will try to get Ozempic approved.  Stage 3a chronic kidney disease (HCC)  Lab Results   Component Value Date    EGFR 47 10/10/2024    EGFR 47 2024    EGFR 53 06/10/2024    CREATININE 1.56 (H) 10/10/2024    CREATININE 1.56 (H) 2024    CREATININE 1.40 (H) 06/10/2024              Depression Screening and Follow-up Plan: Patient with underlying depression and was advised to continue current medications as prescribed.       History of Present Illness     Patient is here to discuss his new diagnosis of diabetes.        History obtained from : patient  Review of Systems   Constitutional:  Negative for chills and fever.   HENT:  Negative for ear pain and sore throat.    Eyes:  Negative for pain and visual disturbance.   Respiratory:  Negative for cough and shortness of breath.    Cardiovascular:  Negative for chest pain and palpitations.   Gastrointestinal:  Negative for abdominal pain and vomiting.   Genitourinary:  Negative for dysuria and hematuria.   Musculoskeletal:  Negative for arthralgias and back pain.   Skin:  Negative for color change and rash.   Neurological:  Negative for seizures and syncope.   All other systems reviewed and are negative.    Past Medical History   Past Medical  History:   Diagnosis Date    Chronic kidney disease     Colon polyp     Depression     Epididymitis 8/18/22    Hypertension      Past Surgical History:   Procedure Laterality Date    COLONOSCOPY      COLONOSCOPY      SHOULDER SURGERY Left 10/2022    THYROID SURGERY  04/02/2024    US GUIDED THYROID BIOPSY  11/14/2023    WISDOM TOOTH EXTRACTION       Family History   Problem Relation Age of Onset    Hypertension Mother     Arthritis Mother     No Known Problems Father     Pancreatic cancer Sister     Cancer Sister     Other Sister         AIDS    Pancreatic cancer Brother     Cancer Brother     No Known Problems Brother     No Known Problems Brother      Current Outpatient Medications on File Prior to Visit   Medication Sig Dispense Refill    amLODIPine-benazepril (LOTREL) 10-40 MG per capsule Take 1 capsule by mouth daily 90 capsule 3    buPROPion (WELLBUTRIN XL) 150 mg 24 hr tablet Take 1 tablet (150 mg total) by mouth every morning 90 tablet 2    cloNIDine (CATAPRES-TTS-3) 0.3 mg/24 hr PLACE 1 PATCH (0.3 MG TOTAL) ON THE SKIN OVER 7 DAYS ONCE A WEEK 12 patch 4    dicyclomine (BENTYL) 20 mg tablet Take 1 tablet (20 mg total) by mouth every 6 (six) hours 36 tablet 1    DULoxetine (CYMBALTA) 30 mg delayed release capsule PLEASE SEE ATTACHED FOR DETAILED DIRECTIONS      multivitamin (THERAGRAN) TABS Take 1 tablet by mouth daily      NIFEdipine 0.3%-lidocaine 5% rectal ointment Apply 1 Application topically every 4 (four) hours as needed for discomfort or pain 10 g 0    Omega-3 Fatty Acids (fish oil) 1,000 mg Take 1,000 mg by mouth daily      sildenafil (VIAGRA) 50 MG tablet Take 1 tablet (50 mg total) by mouth daily as needed for erectile dysfunction 10 tablet 0    tamsulosin (FLOMAX) 0.4 mg TAKE 1 CAPSULE BY MOUTH EVERY DAY WITH DINNER 90 capsule 1    traMADol (Ultram) 50 mg tablet Take 1 tablet (50 mg total) by mouth every 6 (six) hours as needed for moderate pain for up to 20 doses 20 tablet 0    [DISCONTINUED]  "Semaglutide-Weight Management (WEGOVY) 0.25 MG/0.5ML Inject 0.5 mL (0.25 mg total) under the skin once a week for 4 doses 2 mL 0     No current facility-administered medications on file prior to visit.   No Known Allergies       Objective     /78 (BP Location: Left arm, Patient Position: Sitting, Cuff Size: Large)   Pulse 79   Resp 17   Ht 6' 1\" (1.854 m)   Wt 117 kg (258 lb)   SpO2 98%   BMI 34.04 kg/m²     Physical Exam  Vitals and nursing note reviewed.   Constitutional:       General: He is not in acute distress.     Appearance: He is well-developed.   HENT:      Head: Normocephalic and atraumatic.   Eyes:      Conjunctiva/sclera: Conjunctivae normal.   Cardiovascular:      Rate and Rhythm: Normal rate and regular rhythm.      Pulses: no weak pulses.           Dorsalis pedis pulses are 2+ on the right side and 2+ on the left side.      Heart sounds: No murmur heard.  Pulmonary:      Effort: Pulmonary effort is normal. No respiratory distress.      Breath sounds: Normal breath sounds.   Abdominal:      Palpations: Abdomen is soft.      Tenderness: There is no abdominal tenderness.   Musculoskeletal:         General: No swelling.      Cervical back: Neck supple.   Feet:      Right foot:      Skin integrity: No ulcer, skin breakdown, erythema, warmth, callus or dry skin.      Left foot:      Skin integrity: No ulcer, skin breakdown, erythema, warmth, callus or dry skin.   Skin:     General: Skin is warm and dry.      Capillary Refill: Capillary refill takes less than 2 seconds.   Neurological:      Mental Status: He is alert.   Psychiatric:         Mood and Affect: Mood normal.         I have spent a total time of 15 minutes in caring for this patient on the day of the visit/encounter including Instructions for management, Importance of tx compliance, Counseling / Coordination of care, and Reviewing / ordering tests, medicine, procedures  .    Diabetic Foot Exam    Patient's shoes and socks " removed.    Right Foot/Ankle   Right Foot Inspection  Skin Exam: skin normal and skin intact. No dry skin, no warmth, no callus, no erythema, no maceration, no abnormal color, no pre-ulcer, no ulcer and no callus.     Toe Exam: ROM and strength within normal limits.     Sensory   Monofilament testing: intact    Vascular  Capillary refills: < 3 seconds  The right DP pulse is 2+.     Right Toe  - Comprehensive Exam  Ecchymosis: none  Swelling: none       Left Foot/Ankle  Left Foot Inspection  Skin Exam: skin normal and skin intact. No dry skin, no warmth, no erythema, no maceration, normal color, no pre-ulcer, no ulcer and no callus.     Toe Exam: ROM and strength within normal limits.     Sensory   Monofilament testing: intact    Vascular  Capillary refills: < 3 seconds  The left DP pulse is 2+.     Left Toe  - Comprehensive Exam  Ecchymosis: none  Swelling: none       Assign Risk Category  No deformity present  No loss of protective sensation  No weak pulses  Risk: 0

## 2024-10-14 ENCOUNTER — OFFICE VISIT (OUTPATIENT)
Dept: INTERNAL MEDICINE CLINIC | Facility: CLINIC | Age: 59
End: 2024-10-14
Payer: COMMERCIAL

## 2024-10-14 VITALS
RESPIRATION RATE: 17 BRPM | HEIGHT: 73 IN | WEIGHT: 258 LBS | BODY MASS INDEX: 34.19 KG/M2 | SYSTOLIC BLOOD PRESSURE: 126 MMHG | DIASTOLIC BLOOD PRESSURE: 78 MMHG | HEART RATE: 79 BPM | OXYGEN SATURATION: 98 %

## 2024-10-14 DIAGNOSIS — E11.9 TYPE 2 DIABETES MELLITUS WITHOUT COMPLICATION, WITHOUT LONG-TERM CURRENT USE OF INSULIN (HCC): Primary | ICD-10-CM

## 2024-10-14 DIAGNOSIS — N18.31 STAGE 3A CHRONIC KIDNEY DISEASE (HCC): ICD-10-CM

## 2024-10-14 PROBLEM — F32.1 CURRENT MODERATE EPISODE OF MAJOR DEPRESSIVE DISORDER WITHOUT PRIOR EPISODE (HCC): Status: RESOLVED | Noted: 2023-03-15 | Resolved: 2024-10-14

## 2024-10-14 LAB
LEFT EYE DIABETIC RETINOPATHY: ABNORMAL
LEFT EYE IMAGE QUALITY: ABNORMAL
LEFT EYE MACULAR EDEMA: ABNORMAL
LEFT EYE OTHER RETINOPATHY: ABNORMAL
RIGHT EYE DIABETIC RETINOPATHY: ABNORMAL
RIGHT EYE IMAGE QUALITY: ABNORMAL
RIGHT EYE MACULAR EDEMA: ABNORMAL
RIGHT EYE OTHER RETINOPATHY: ABNORMAL
SEVERITY (EYE EXAM): ABNORMAL

## 2024-10-14 PROCEDURE — 99214 OFFICE O/P EST MOD 30 MIN: CPT | Performed by: INTERNAL MEDICINE

## 2024-10-14 PROCEDURE — 82570 ASSAY OF URINE CREATININE: CPT | Performed by: INTERNAL MEDICINE

## 2024-10-14 PROCEDURE — 82043 UR ALBUMIN QUANTITATIVE: CPT | Performed by: INTERNAL MEDICINE

## 2024-10-14 NOTE — ASSESSMENT & PLAN NOTE
Lab Results   Component Value Date    EGFR 47 10/10/2024    EGFR 47 09/26/2024    EGFR 53 06/10/2024    CREATININE 1.56 (H) 10/10/2024    CREATININE 1.56 (H) 09/26/2024    CREATININE 1.40 (H) 06/10/2024

## 2024-10-21 ENCOUNTER — OFFICE VISIT (OUTPATIENT)
Dept: NEPHROLOGY | Facility: CLINIC | Age: 59
End: 2024-10-21
Payer: COMMERCIAL

## 2024-10-21 VITALS
DIASTOLIC BLOOD PRESSURE: 80 MMHG | OXYGEN SATURATION: 95 % | HEIGHT: 73 IN | WEIGHT: 259 LBS | SYSTOLIC BLOOD PRESSURE: 140 MMHG | RESPIRATION RATE: 16 BRPM | TEMPERATURE: 97.7 F | BODY MASS INDEX: 34.33 KG/M2 | HEART RATE: 94 BPM

## 2024-10-21 DIAGNOSIS — N18.9 CHRONIC KIDNEY DISEASE-MINERAL AND BONE DISORDER: ICD-10-CM

## 2024-10-21 DIAGNOSIS — E11.9 TYPE 2 DIABETES MELLITUS WITHOUT COMPLICATION, WITHOUT LONG-TERM CURRENT USE OF INSULIN (HCC): ICD-10-CM

## 2024-10-21 DIAGNOSIS — M89.9 CHRONIC KIDNEY DISEASE-MINERAL AND BONE DISORDER: ICD-10-CM

## 2024-10-21 DIAGNOSIS — I10 ESSENTIAL HYPERTENSION: ICD-10-CM

## 2024-10-21 DIAGNOSIS — N18.31 STAGE 3A CHRONIC KIDNEY DISEASE (HCC): Primary | ICD-10-CM

## 2024-10-21 DIAGNOSIS — E66.09 CLASS 1 OBESITY DUE TO EXCESS CALORIES WITH SERIOUS COMORBIDITY AND BODY MASS INDEX (BMI) OF 34.0 TO 34.9 IN ADULT: ICD-10-CM

## 2024-10-21 DIAGNOSIS — E83.9 CHRONIC KIDNEY DISEASE-MINERAL AND BONE DISORDER: ICD-10-CM

## 2024-10-21 DIAGNOSIS — E66.811 CLASS 1 OBESITY DUE TO EXCESS CALORIES WITH SERIOUS COMORBIDITY AND BODY MASS INDEX (BMI) OF 34.0 TO 34.9 IN ADULT: ICD-10-CM

## 2024-10-21 PROCEDURE — 99214 OFFICE O/P EST MOD 30 MIN: CPT | Performed by: INTERNAL MEDICINE

## 2024-10-21 NOTE — PROGRESS NOTES
NEPHROLOGY OFFICE FOLLOW UP  Agusto Riojas 59 y.o. male MRN: [id number]    Encounter: [id number] 10/21/2024    ASSESSMENT and PLAN:            REASON FOR VISIT: Agusto Riojas is a 59 y.o. male who is here on 10/21/2024 for Chronic Kidney Disease and Follow-up  .    HPI:    Agusto came in today for follow-up of CKD.  Overall doing well denies any acute complaint    No chest pain no palpitation    No nausea no vomiting    Denies any urinary complaint        REVIEW OF SYSTEMS:    Review of Systems   Constitutional:  Negative for fatigue.   HENT:  Negative for congestion.    Eyes:  Negative for photophobia and pain.   Respiratory:  Negative for chest tightness and shortness of breath.    Cardiovascular:  Negative for chest pain and palpitations.   Gastrointestinal:  Negative for abdominal distention, abdominal pain and blood in stool.   Endocrine: Negative for polydipsia.   Genitourinary:  Negative for difficulty urinating, dysuria, flank pain, hematuria and urgency.   Musculoskeletal:  Negative for arthralgias and back pain.   Skin:  Negative for rash.   Neurological:  Negative for dizziness, light-headedness and headaches.   Hematological:  Does not bruise/bleed easily.   Psychiatric/Behavioral:  Negative for behavioral problems. The patient is not nervous/anxious.          PAST MEDICAL HISTORY:  Past Medical History:   Diagnosis Date    Chronic kidney disease     Colon polyp     Depression     Epididymitis 8/18/22    Hypertension        PAST SURGICAL HISTORY:  Past Surgical History:   Procedure Laterality Date    COLONOSCOPY      COLONOSCOPY      SHOULDER SURGERY Left 10/2022    THYROID SURGERY  04/02/2024    US GUIDED THYROID BIOPSY  11/14/2023    WISDOM TOOTH EXTRACTION         SOCIAL HISTORY:  Social History     Substance and Sexual Activity   Alcohol Use Yes    Alcohol/week: 1.0 standard drink of alcohol    Types: 1 Standard drinks or equivalent per week    Comment: Once a week     Social History     Substance  and Sexual Activity   Drug Use Never     Social History     Tobacco Use   Smoking Status Never    Passive exposure: Never   Smokeless Tobacco Never       FAMILY HISTORY:  Family History   Problem Relation Age of Onset    Hypertension Mother     Arthritis Mother     No Known Problems Father     Pancreatic cancer Sister     Cancer Sister     Other Sister         AIDS    Pancreatic cancer Brother     Cancer Brother     No Known Problems Brother     No Known Problems Brother        MEDICATIONS:    Current Outpatient Medications:     amLODIPine-benazepril (LOTREL) 10-40 MG per capsule, Take 1 capsule by mouth daily, Disp: 90 capsule, Rfl: 3    buPROPion (WELLBUTRIN XL) 150 mg 24 hr tablet, Take 1 tablet (150 mg total) by mouth every morning, Disp: 90 tablet, Rfl: 2    cloNIDine (CATAPRES-TTS-3) 0.3 mg/24 hr, PLACE 1 PATCH (0.3 MG TOTAL) ON THE SKIN OVER 7 DAYS ONCE A WEEK, Disp: 12 patch, Rfl: 4    dicyclomine (BENTYL) 20 mg tablet, Take 1 tablet (20 mg total) by mouth every 6 (six) hours (Patient taking differently: Take 20 mg by mouth as needed), Disp: 36 tablet, Rfl: 1    DULoxetine (CYMBALTA) 30 mg delayed release capsule, PLEASE SEE ATTACHED FOR DETAILED DIRECTIONS, Disp: , Rfl:     multivitamin (THERAGRAN) TABS, Take 1 tablet by mouth daily, Disp: , Rfl:     NIFEdipine 0.3%-lidocaine 5% rectal ointment, Apply 1 Application topically every 4 (four) hours as needed for discomfort or pain, Disp: 10 g, Rfl: 0    Omega-3 Fatty Acids (fish oil) 1,000 mg, Take 1,000 mg by mouth daily, Disp: , Rfl:     semaglutide, 0.25 or 0.5 mg/dose, (Ozempic, 0.25 or 0.5 MG/DOSE,) 2 mg/3 mL injection pen, 0.25 mg under the skin every 7 days for 4 doses (28 days), THEN 0.5 mg under the skin every 7 days, Disp: 9 mL, Rfl: 0    sildenafil (VIAGRA) 50 MG tablet, Take 1 tablet (50 mg total) by mouth daily as needed for erectile dysfunction, Disp: 10 tablet, Rfl: 0    tamsulosin (FLOMAX) 0.4 mg, TAKE 1 CAPSULE BY MOUTH EVERY DAY WITH DINNER,  "Disp: 90 capsule, Rfl: 1    traMADol (Ultram) 50 mg tablet, Take 1 tablet (50 mg total) by mouth every 6 (six) hours as needed for moderate pain for up to 20 doses, Disp: 20 tablet, Rfl: 0    PHYSICAL EXAM:  Vitals:    10/21/24 1318   BP: 140/80   BP Location: Right arm   Patient Position: Sitting   Pulse: 94   Resp: 16   Temp: 97.7 °F (36.5 °C)   TempSrc: Temporal   SpO2: 95%   Weight: 117 kg (259 lb)   Height: 6' 1\" (1.854 m)     Body mass index is 34.17 kg/m².    Physical Exam  Constitutional:       General: He is not in acute distress.     Appearance: He is well-developed.   HENT:      Head: Normocephalic.      Mouth/Throat:      Mouth: Mucous membranes are moist.   Eyes:      General: No scleral icterus.     Conjunctiva/sclera: Conjunctivae normal.   Neck:      Vascular: No JVD.   Cardiovascular:      Rate and Rhythm: Normal rate.      Heart sounds: Normal heart sounds.   Pulmonary:      Effort: Pulmonary effort is normal.      Breath sounds: No wheezing.   Abdominal:      Palpations: Abdomen is soft.      Tenderness: There is no abdominal tenderness.   Musculoskeletal:         General: Normal range of motion.      Cervical back: Neck supple.   Skin:     General: Skin is warm.      Findings: No rash.   Neurological:      Mental Status: He is alert and oriented to person, place, and time.   Psychiatric:         Behavior: Behavior normal.         LAB RESULTS:  Results for orders placed or performed in visit on 10/14/24   IRIS Diabetic eye exam    Collection Time: 10/14/24  9:18 AM   Result Value Ref Range    Severity ALERT (A)     Right Eye Diabetic Retinopathy INDET     Right Eye Macular Edema INDET     Right Eye Other Retinopathy INDET     Right Eye Image Quality Not Gradable Image     Left Eye Diabetic Retinopathy INDET     Left Eye Macular Edema INDET     Left Eye Other Retinopathy INDET     Left Eye Image Quality Not Gradable Image     Result Retinal Study Result for TALHA GREG     Result      Result   "     TALHA GARZA, a 60 y/o, M (: 1965, MRN: [id number])    Result       presented to [facility] on 10- for a retinal imaging study of the left and right eyes.    Result      Result       Based on the findings of the study, the following is recommended for TALHA GARZA    Result       Not Gradable Eye(s) Found: Schedule an appointment with a retina specialist for further evaluation within 3 months.    Result      Result       Interpreting Provider's Comments:  No comments provided    Result       Diagnoses Present: E119 - Type 2 diabetes mellitus without complications    Result      Result       Right eye findings: Eye not gradable for reasons listed: Key anatomy missing or not clearly visible    Result      Result       Left eye findings: Eye not gradable for reasons listed: Key anatomy missing or not clearly visible    Result      Result       This result was electronically signed by Serg Aguillon MD, NPI: [id number], Taxonomy: [id number] on 10- 13:21 [facility].    Result      Result       NOTE:  Any pathology noted on this diabetic retinal evaluation should be confirmed by an appropriate ophthalmic examination.         Stage 3a chronic kidney disease (HCC)  Lab Results   Component Value Date    EGFR 47 10/10/2024    EGFR 47 2024    EGFR 53 06/10/2024    CREATININE 1.56 (H) 10/10/2024    CREATININE 1.56 (H) 2024    CREATININE 1.40 (H) 06/10/2024   Renal function is quite stable.  Advise hydration and avoiding nephrotoxic medication    Chronic kidney disease-mineral and bone disorder  Lab Results   Component Value Date    EGFR 47 10/10/2024    EGFR 47 2024    EGFR 53 06/10/2024    CREATININE 1.56 (H) 10/10/2024    CREATININE 1.56 (H) 2024    CREATININE 1.40 (H) 06/10/2024   PTH and phosphorus along with vitamin D are within acceptable range and will continue to monitor    Essential hypertension  Very well-controlled    Type 2 diabetes  "mellitus without complication, without long-term current use of insulin ([facility])    Lab Results   Component Value Date    HGBA1C 6.7 (H) 10/10/2024   Quite stable      Wheezing discussed with the patient at length.  I will see him back in 1 year again.  Will get blood and urine test before that visit        Portions of the record may have been created with voice recognition software. Occasional wrong word or \"sound a like\" substitutions may have occurred due to the inherent limitations of voice recognition software. Read the chart carefully and recognize, using context, where substitutions have occurred.If you have any questions, please contact the dictating provider.   "

## 2024-10-21 NOTE — ASSESSMENT & PLAN NOTE
Lab Results   Component Value Date    EGFR 47 10/10/2024    EGFR 47 09/26/2024    EGFR 53 06/10/2024    CREATININE 1.56 (H) 10/10/2024    CREATININE 1.56 (H) 09/26/2024    CREATININE 1.40 (H) 06/10/2024   PTH and phosphorus along with vitamin D are within acceptable range and will continue to monitor

## 2024-10-21 NOTE — ASSESSMENT & PLAN NOTE
Lab Results   Component Value Date    EGFR 47 10/10/2024    EGFR 47 09/26/2024    EGFR 53 06/10/2024    CREATININE 1.56 (H) 10/10/2024    CREATININE 1.56 (H) 09/26/2024    CREATININE 1.40 (H) 06/10/2024   Renal function is quite stable.  Advise hydration and avoiding nephrotoxic medication

## 2024-11-27 DIAGNOSIS — I10 ESSENTIAL HYPERTENSION: ICD-10-CM

## 2024-11-27 RX ORDER — AMLODIPINE AND BENAZEPRIL HYDROCHLORIDE 10; 40 MG/1; MG/1
1 CAPSULE ORAL DAILY
Qty: 90 CAPSULE | Refills: 3 | Status: SHIPPED | OUTPATIENT
Start: 2024-11-27

## 2024-12-30 ENCOUNTER — TELEPHONE (OUTPATIENT)
Age: 59
End: 2024-12-30

## 2024-12-30 DIAGNOSIS — E11.9 TYPE 2 DIABETES MELLITUS WITHOUT COMPLICATION, WITHOUT LONG-TERM CURRENT USE OF INSULIN (HCC): ICD-10-CM

## 2024-12-30 NOTE — TELEPHONE ENCOUNTER
Patients wife states that patient is getting used to the .25 Ozempic and is going back to old ways, she states Dr told her to advise when this happens so it can be adjusted up to .5 , [facility] pharmacy, please advise when sent or needs anything further

## 2024-12-31 DIAGNOSIS — E11.9 TYPE 2 DIABETES MELLITUS WITHOUT COMPLICATION, WITHOUT LONG-TERM CURRENT USE OF INSULIN (HCC): ICD-10-CM

## 2025-01-06 ENCOUNTER — TELEPHONE (OUTPATIENT)
Age: 60
End: 2025-01-06

## 2025-01-06 NOTE — TELEPHONE ENCOUNTER
Patient spouse called stating office visits with Dr Mcadams need to be resubmitted to [facility]. Spouse states apts were billed to the wrong insurance.     Office visits: 6/28/24, 9/27/24, 10/24/24    Please advise  Thank you

## 2025-01-20 ENCOUNTER — TELEPHONE (OUTPATIENT)
Dept: UROLOGY | Facility: CLINIC | Age: 60
End: 2025-01-20

## 2025-01-20 NOTE — TELEPHONE ENCOUNTER
R/S surg  Received: Today  [facility] Coordinator; NAJMA Meek Nurse  Pt wants to r/s 2/03 surgery for a late date told him to have testing done until new date is made ..please follow up with pt

## 2025-01-22 NOTE — TELEPHONE ENCOUNTER
Spoke  with patient he wants to cancel due [place].  Patient will call back to reschedule he dosent want to reschedule at this time because he does not know when he is coming back

## 2025-01-30 DIAGNOSIS — N52.9 ERECTILE DYSFUNCTION, UNSPECIFIED ERECTILE DYSFUNCTION TYPE: ICD-10-CM

## 2025-01-30 RX ORDER — SILDENAFIL 50 MG/1
50 TABLET, FILM COATED ORAL DAILY PRN
Qty: 10 TABLET | Refills: 0 | Status: SHIPPED | OUTPATIENT
Start: 2025-01-30

## 2025-01-30 NOTE — TELEPHONE ENCOUNTER
Spouse called in stating patient was told if current dose is not working that patient can have an increase in his dose.     Spouse stated patient is requesting an increase.     Please advise  Thank you

## 2025-02-07 DIAGNOSIS — E11.9 TYPE 2 DIABETES MELLITUS WITHOUT COMPLICATION, WITHOUT LONG-TERM CURRENT USE OF INSULIN (HCC): ICD-10-CM

## 2025-02-11 ENCOUNTER — TELEPHONE (OUTPATIENT)
Age: 60
End: 2025-02-11

## 2025-02-11 NOTE — TELEPHONE ENCOUNTER
PA for semaglutide, 0.25 or 0.5 mg/dose, (Ozempic, 0.25 or 0.5 MG/DOSE,) SUBMITTED to     via    []CMM-KEY:   [x]Surescripts-Case ID # [id number]   []Availity-Auth ID # NDC #   []Faxed to plan   []Other website   []Phone call Case ID #     [x]PA sent as URGENT    All office notes, labs and other pertaining documents and studies sent. Clinical questions answered. Awaiting determination from insurance company.     Turnaround time for your insurance to make a decision on your Prior Authorization can take 7-21 business days.

## 2025-02-11 NOTE — TELEPHONE ENCOUNTER
PA for semaglutide, 0.25 or 0.5 mg/dose, (Ozempic, 0.25 or 0.5 MG/DOSE,)  APPROVED     Date(s) approved  February 1, 2025 to February 11, 2026     Case # [id number]     Patient advised by          []MyChart Message  [x]Phone call   []LMOM  []L/M to call office as no active Communication consent on file  []Unable to leave detailed message as VM not approved on Communication consent       Pharmacy advised by    [x]Fax  []Phone call    Approval letter scanned into Media Yes

## 2025-02-19 ENCOUNTER — OFFICE VISIT (OUTPATIENT)
Dept: INTERNAL MEDICINE CLINIC | Facility: CLINIC | Age: 60
End: 2025-02-19
Payer: COMMERCIAL

## 2025-02-19 VITALS
HEIGHT: 73 IN | HEART RATE: 66 BPM | SYSTOLIC BLOOD PRESSURE: 118 MMHG | BODY MASS INDEX: 33.8 KG/M2 | OXYGEN SATURATION: 95 % | WEIGHT: 255 LBS | DIASTOLIC BLOOD PRESSURE: 70 MMHG

## 2025-02-19 DIAGNOSIS — Z23 ENCOUNTER FOR IMMUNIZATION: ICD-10-CM

## 2025-02-19 DIAGNOSIS — I10 ESSENTIAL HYPERTENSION: ICD-10-CM

## 2025-02-19 DIAGNOSIS — N18.31 STAGE 3A CHRONIC KIDNEY DISEASE (HCC): Primary | ICD-10-CM

## 2025-02-19 DIAGNOSIS — E11.9 TYPE 2 DIABETES MELLITUS WITHOUT COMPLICATION, WITHOUT LONG-TERM CURRENT USE OF INSULIN (HCC): ICD-10-CM

## 2025-02-19 DIAGNOSIS — N18.32 CHRONIC KIDNEY DISEASE, STAGE 3B (HCC): ICD-10-CM

## 2025-02-19 DIAGNOSIS — E78.2 MIXED HYPERLIPIDEMIA: ICD-10-CM

## 2025-02-19 DIAGNOSIS — N52.9 ERECTILE DYSFUNCTION, UNSPECIFIED ERECTILE DYSFUNCTION TYPE: ICD-10-CM

## 2025-02-19 LAB
LEFT EYE DIABETIC RETINOPATHY: NORMAL
LEFT EYE IMAGE QUALITY: NORMAL
LEFT EYE MACULAR EDEMA: NORMAL
LEFT EYE OTHER RETINOPATHY: NORMAL
RIGHT EYE DIABETIC RETINOPATHY: NORMAL
RIGHT EYE IMAGE QUALITY: NORMAL
RIGHT EYE MACULAR EDEMA: NORMAL
RIGHT EYE OTHER RETINOPATHY: NORMAL
SEVERITY (EYE EXAM): NORMAL

## 2025-02-19 PROCEDURE — G2211 COMPLEX E/M VISIT ADD ON: HCPCS | Performed by: INTERNAL MEDICINE

## 2025-02-19 PROCEDURE — G0008 ADMIN INFLUENZA VIRUS VAC: HCPCS | Performed by: INTERNAL MEDICINE

## 2025-02-19 PROCEDURE — 92250 FUNDUS PHOTOGRAPHY W/I&R: CPT | Performed by: INTERNAL MEDICINE

## 2025-02-19 PROCEDURE — 99214 OFFICE O/P EST MOD 30 MIN: CPT | Performed by: INTERNAL MEDICINE

## 2025-02-19 PROCEDURE — 90656 IIV3 VACC NO PRSV 0.5 ML IM: CPT | Performed by: INTERNAL MEDICINE

## 2025-02-19 RX ORDER — ATORVASTATIN CALCIUM 10 MG/1
10 TABLET, FILM COATED ORAL DAILY
Qty: 30 TABLET | Refills: 5 | Status: SHIPPED | OUTPATIENT
Start: 2025-02-19

## 2025-02-19 RX ORDER — SILDENAFIL 100 MG/1
100 TABLET, FILM COATED ORAL DAILY PRN
Qty: 10 TABLET | Refills: 0 | Status: SHIPPED | OUTPATIENT
Start: 2025-02-19

## 2025-02-19 NOTE — ASSESSMENT & PLAN NOTE
Lab Results   Component Value Date    EGFR 47 10/10/2024    EGFR 47 09/26/2024    EGFR 53 06/10/2024    CREATININE 1.56 (H) 10/10/2024    CREATININE 1.56 (H) 09/26/2024    CREATININE 1.40 (H) 06/10/2024             previous_has_had_prior_Botox

## 2025-02-19 NOTE — ASSESSMENT & PLAN NOTE
Check A1c. Continue ozempic. He will increase to 0.5 next week.    Eye exam today.  Lab Results   Component Value Date    HGBA1C 6.7 (H) 10/10/2024       Orders:    Albumin / creatinine urine ratio; Future    Comprehensive metabolic panel; Future    Hemoglobin A1C; Future    CBC and Platelet; Future    TSH, 3rd generation with Free T4 reflex; Future    Microalbumin, Random Urine (W/Creatinine) ([facility] ONLY); Future    IRIS Diabetic eye exam

## 2025-02-19 NOTE — PROGRESS NOTES
Name: Agusto Riojas      : 1965      MRN: [id number]  Encounter Provider: Deon Mcadams MD  Encounter Date: 2025   Encounter department: [facility] [place]  :  Assessment & Plan  Type 2 diabetes mellitus without complication, without long-term current use of insulin (HCC)  Check A1c. Continue ozempic. He will increase to 0.5 next week.    Eye exam today.  Lab Results   Component Value Date    HGBA1C 6.7 (H) 10/10/2024       Orders:    Albumin / creatinine urine ratio; Future    Comprehensive metabolic panel; Future    Hemoglobin A1C; Future    CBC and Platelet; Future    TSH, 3rd generation with Free T4 reflex; Future    Microalbumin, Random Urine (W/Creatinine) ([facility] ONLY); Future    IRIS Diabetic eye exam    Stage 3a chronic kidney disease (HCC)  Lab Results   Component Value Date    EGFR 47 10/10/2024    EGFR 47 2024    EGFR 53 06/10/2024    CREATININE 1.56 (H) 10/10/2024    CREATININE 1.56 (H) 2024    CREATININE 1.40 (H) 06/10/2024            Chronic kidney disease, stage 3b (HCC)  Lab Results   Component Value Date    EGFR 47 10/10/2024    EGFR 47 2024    EGFR 53 06/10/2024    CREATININE 1.56 (H) 10/10/2024    CREATININE 1.56 (H) 2024    CREATININE 1.40 (H) 06/10/2024            Encounter for immunization    Orders:    Fluzone 0.5 mL IM    Essential hypertension  Controlled.        Erectile dysfunction, unspecified erectile dysfunction type  Increase sildenafil.  Orders:    sildenafil (VIAGRA) 100 mg tablet; Take 1 tablet (100 mg total) by mouth daily as needed for erectile dysfunction    Mixed hyperlipidemia  The 10-year ASCVD risk score (Ana POMPA, et al., 2019) is: 22.5%    Values used to calculate the score:      Age: 60 years      Sex: Male      Is Non- : Yes      Diabetic: Yes      Tobacco smoker: No      Systolic Blood Pressure: 118 mmHg      Is BP treated: Yes      HDL Cholesterol: 35 mg/dL      Total  "Cholesterol: 139 mg/dL    Orders:    atorvastatin (LIPITOR) 10 mg tablet; Take 1 tablet (10 mg total) by mouth daily          Depression Screening and Follow-up Plan:   Patient with underlying depression and was advised to continue current medications as prescribed.     ASCVD Risk Management:  The 10-year ASCVD risk score (Ana POMPA, et al., 2019) is: 22.5%    Patent does not have underlying ASCVD. Their ASCVD Risk is high (>= 20%).    History of Present Illness   Patient is here for routine follow up, reviewed chronic medical problems, ordered labs for next visit including CBC CMP TSH A1C LIPID. Reviewed labs for this visit.      Review of Systems   Constitutional:  Negative for chills and fever.   HENT:  Negative for ear pain and sore throat.    Eyes:  Negative for pain and visual disturbance.   Respiratory:  Negative for cough and shortness of breath.    Cardiovascular:  Negative for chest pain and palpitations.   Gastrointestinal:  Negative for abdominal pain and vomiting.   Genitourinary:  Negative for dysuria and hematuria.   Musculoskeletal:  Negative for arthralgias and back pain.   Skin:  Negative for color change and rash.   Neurological:  Negative for seizures and syncope.   All other systems reviewed and are negative.      Objective   /70 (BP Location: Left arm, Patient Position: Sitting, Cuff Size: Large)   Pulse 66   Ht 6' 1\" (1.854 m)   Wt 116 kg (255 lb)   SpO2 95%   BMI 33.64 kg/m²      Physical Exam  Vitals and nursing note reviewed.   Constitutional:       General: He is not in acute distress.     Appearance: He is well-developed.   HENT:      Head: Normocephalic and atraumatic.   Eyes:      Conjunctiva/sclera: Conjunctivae normal.   Cardiovascular:      Rate and Rhythm: Normal rate and regular rhythm.      Heart sounds: No murmur heard.  Pulmonary:      Effort: Pulmonary effort is normal. No respiratory distress.      Breath sounds: Normal breath sounds.   Abdominal:      Palpations: " Abdomen is soft.      Tenderness: There is no abdominal tenderness.   Musculoskeletal:         General: No swelling.      Cervical back: Neck supple.   Skin:     General: Skin is warm and dry.      Capillary Refill: Capillary refill takes less than 2 seconds.   Neurological:      Mental Status: He is alert.   Psychiatric:         Mood and Affect: Mood normal.

## 2025-02-27 ENCOUNTER — TELEPHONE (OUTPATIENT)
Dept: GASTROENTEROLOGY | Facility: CLINIC | Age: 60
End: 2025-02-27

## 2025-02-27 NOTE — TELEPHONE ENCOUNTER
Patient would like to schedule his 3 year recall colonoscopy.  Please phone [phone]  Thank you

## 2025-02-28 ENCOUNTER — PREP FOR PROCEDURE (OUTPATIENT)
Dept: GASTROENTEROLOGY | Facility: CLINIC | Age: 60
End: 2025-02-28

## 2025-02-28 DIAGNOSIS — Z86.0100 HISTORY OF COLON POLYPS: Primary | ICD-10-CM

## 2025-03-05 DIAGNOSIS — R39.12 WEAK URINARY STREAM: ICD-10-CM

## 2025-03-05 DIAGNOSIS — I10 ESSENTIAL HYPERTENSION: ICD-10-CM

## 2025-03-05 DIAGNOSIS — N40.1 BENIGN PROSTATIC HYPERPLASIA WITH LOWER URINARY TRACT SYMPTOMS, SYMPTOM DETAILS UNSPECIFIED: ICD-10-CM

## 2025-03-05 RX ORDER — TAMSULOSIN HYDROCHLORIDE 0.4 MG/1
0.4 CAPSULE ORAL
Qty: 90 CAPSULE | Refills: 1 | Status: SHIPPED | OUTPATIENT
Start: 2025-03-05

## 2025-03-06 RX ORDER — AMLODIPINE AND BENAZEPRIL HYDROCHLORIDE 10; 40 MG/1; MG/1
1 CAPSULE ORAL DAILY
Qty: 90 CAPSULE | Refills: 1 | Status: SHIPPED | OUTPATIENT
Start: 2025-03-06

## 2025-03-10 DIAGNOSIS — I10 ESSENTIAL HYPERTENSION: ICD-10-CM

## 2025-03-10 RX ORDER — CLONIDINE 0.3 MG/24H
1 PATCH, EXTENDED RELEASE TRANSDERMAL WEEKLY
Qty: 12 PATCH | Refills: 4 | Status: SHIPPED | OUTPATIENT
Start: 2025-03-10 | End: 2025-06-08

## 2025-03-12 ENCOUNTER — TELEPHONE (OUTPATIENT)
Dept: GASTROENTEROLOGY | Facility: AMBULARY SURGERY CENTER | Age: 60
End: 2025-03-12

## 2025-03-12 NOTE — LETTER
March 12, 2025    Agusto Riojas  [address] [place] [address]      Dear Mr. Welcome:             COLONOSCOPY  MIRALAX/Dulcolax Bowel Preparation Instructions    The OR/GI Lab will contact you the evening prior to your procedure with your exact arrival time.    Our practice requires a 1 week notice for any cancellations or rescheduling. We kindly ask that you immediately notify us of any changes including any new medications that are prescribed. Thank you for your cooperation.     WEEK BEFORE YOUR PROCEDURE:  Stop taking Iron tablets.  5 days prior, AVOID vegetables and fruits with skins or seeds, nuts, corn, popcorn and whole grain breads.   Purchase: One (1) 238-gram container of Miralax (polyethylene glycol 3350), four (4) 5 mg Dulcolax (bisacodyl) tablets, and one (1) 64-ounce bottle of Gatorade (sports drink) - no red, orange, or purple. These may be purchased at any pharmacy without a prescription. Generic products are permissible.   Arrange responsible transportation for day of the procedure.     DAY BEFORE THE PROCEDURE:   CLEAR liquids only for entire day prior. Nothing red, orange or purple.    You MAY have:                                                               Soda  Water  Broth Gatorade  Jello  Popsicles Coffee/tea without milk/creamer     YOU MAY NOT HAVE:  Solid foods   Milk and milk products    Juice with pulp    BOWEL PREPARATION:  Includes: One (1) 238-gram container of Miralax (polyethylene glycol 3350), four (4) 5 mg Dulcolax (bisacodyl) tablets, and one (1) 64-ounce bottle of Gatorade (sports drink).  Preparation may be refrigerated.  Entire bowel prep should be completed.     Afternoon before the procedure (2:00 pm - 5:00 pm):    Take two (2) 5 mg Dulcolax laxative tablets.     Evening before the procedure (6:00 pm):  Mix entire container of Miralax with one (1) 64-ounce bottle of Gatorade and shake until all medication is dissolved.   Begin drinking solution. Drink an eight  (8) ounce cup every 10-15 minutes until you have consumed half (32 ounces) of the solution.  Refrigerate remaining solution.    Night before the procedure (8:00 pm):  Take two (2) 5 mg Dulcolax laxative tablets.     Beginning 5 hours before your procedure:  Drink the remaining amount of prepared solution (32 ounces).  Drink an eight (8) ounce cup every 10-15 minutes until you have consumed the remaining solution.     Bowel prep should be completed 4 hours prior to procedure time.    NOTHING TO EAT OR DRINK AFTER MIDNIGHT- EXCEPT FOR YOUR PREP    DAY OF THE PROCEDURE:  You may brush your teeth.  Leave all jewelry at home.  Please arrive for your procedure as indicated by the OR / GI Lab / Endoscopy Unit. The hospital will contact you the day before with your exact arrival time.   Make sure you have arranged ahead of time for a responsible adult (18 or older) to accompany and drive you home after the procedure.  Please discuss any transportation concerns with our staff prior to your procedure.    The effects of the anesthesia can persist for 24 hours.  After receiving the sedation, you must exercise caution before engaging in any activity that could harm yourself and others (such as driving a car).  Do not make any important decisions or do not drink any alcoholic beverages during this time period.  After your procedure, you may have anything you'd like to eat or drink.  You will probably want to start with something light.  Please include plenty of fluids.  Avoid items that cause gas such as sodas and salads.    SPECIAL INSTRUCTIONS:    For patients currently taking blood thinners and/or antiplatelet therapy our office will contact the prescribing provider.  Our office will contact you with any required changes to your medication regimen.     Blood thinner (i.e. - Coumadin, Pradaxa, Lovenox, Xarelto, Eliquis)  ?  Continue (Do Not Stop)  ? Stop______________for_____________days prior to the procedure.    Antiplatelet  (i.e. - Plavix, Aggrenox, Effient, Brilinta)  ?  Continue (Do Not Stop)  ? Stop______________for_____________days prior to the procedure.       Diabetes:   If you are Diabetic, please see separate Diabetic Instruction Sheet.          Prescribed medications:  Do not stop your aspirin, or any of your other medications (unless instructed otherwise).    Take the rest of your prescribed medications with small sips of water at least 2 hours prior to your procedure.      For any questions or concerns related to your bowel preparation or pre-procedure instructions, please contact our office at [phone].  Thank you for choosing [facility]!

## 2025-03-12 NOTE — TELEPHONE ENCOUNTER
Scheduled date of colonoscopy (as of today): 4/8/25  Physician performing colonoscopy: TRACY  Location of colonoscopy: [place]  Bowel prep reviewed with patient: REY / BEATRIZ / JAYDA  Instructions reviewed with patient by: MIKA  Clearances: N/A

## 2025-03-12 NOTE — TELEPHONE ENCOUNTER
03/12/25  Screened by: Kenzie Bautista    Referring Provider     Pre- Screening:     There is no height or weight on file to calculate BMI.  Has patient been referred for a routine screening Colonoscopy? yes  Is the patient between 45-75 years old? yes      Previous Colonoscopy yes   If yes:    Date: 4/2022    Facility:     Reason:         Does the patient want to see a Gastroenterologist prior to their procedure OR are they having any GI symptoms? no    Has the patient been hospitalized or had abdominal surgery in the past 6 months? no    Does the patient use supplemental oxygen? no    Does the patient take Coumadin, Lovenox, Plavix, Elliquis, Xarelto, or other blood thinning medication? no    Has the patient had a stroke, cardiac event, or stent placed in the past year? no    If patient is between 45yrs - 49yrs, please advise patient that we will have to confirm benefits & coverage with their insurance company for a routine screening colonoscopy.

## 2025-03-14 DIAGNOSIS — E78.2 MIXED HYPERLIPIDEMIA: ICD-10-CM

## 2025-03-14 RX ORDER — ATORVASTATIN CALCIUM 10 MG/1
10 TABLET, FILM COATED ORAL DAILY
Qty: 90 TABLET | Refills: 1 | Status: SHIPPED | OUTPATIENT
Start: 2025-03-14

## 2025-04-09 ENCOUNTER — HOSPITAL ENCOUNTER (OUTPATIENT)
Dept: GASTROENTEROLOGY | Facility: HOSPITAL | Age: 60
Setting detail: OUTPATIENT SURGERY
Discharge: HOME | End: 2025-04-09
Attending: INTERNAL MEDICINE
Payer: COMMERCIAL

## 2025-04-09 ENCOUNTER — ANESTHESIA (OUTPATIENT)
Dept: GASTROENTEROLOGY | Facility: HOSPITAL | Age: 60
End: 2025-04-09
Payer: COMMERCIAL

## 2025-04-09 ENCOUNTER — ANESTHESIA EVENT (OUTPATIENT)
Dept: GASTROENTEROLOGY | Facility: HOSPITAL | Age: 60
End: 2025-04-09
Payer: COMMERCIAL

## 2025-04-09 VITALS
HEART RATE: 71 BPM | BODY MASS INDEX: 33.98 KG/M2 | OXYGEN SATURATION: 95 % | HEIGHT: 73 IN | SYSTOLIC BLOOD PRESSURE: 128 MMHG | WEIGHT: 256.39 LBS | RESPIRATION RATE: 18 BRPM | TEMPERATURE: 97 F | DIASTOLIC BLOOD PRESSURE: 77 MMHG

## 2025-04-09 DIAGNOSIS — Z86.0100 HISTORY OF COLON POLYPS: ICD-10-CM

## 2025-04-09 DIAGNOSIS — Z12.11 COLON CANCER SCREENING: ICD-10-CM

## 2025-04-09 LAB — GLUCOSE SERPL-MCNC: 96 MG/DL (ref 65–140)

## 2025-04-09 PROCEDURE — 88305 TISSUE EXAM BY PATHOLOGIST: CPT | Performed by: STUDENT IN AN ORGANIZED HEALTH CARE EDUCATION/TRAINING PROGRAM

## 2025-04-09 PROCEDURE — 82948 REAGENT STRIP/BLOOD GLUCOSE: CPT

## 2025-04-09 PROCEDURE — 45385 COLONOSCOPY W/LESION REMOVAL: CPT | Performed by: INTERNAL MEDICINE

## 2025-04-09 RX ORDER — PROPOFOL 10 MG/ML
INJECTION, EMULSION INTRAVENOUS AS NEEDED
Status: DISCONTINUED | OUTPATIENT
Start: 2025-04-09 | End: 2025-04-09

## 2025-04-09 RX ORDER — LIDOCAINE HYDROCHLORIDE 20 MG/ML
INJECTION, SOLUTION EPIDURAL; INFILTRATION; INTRACAUDAL; PERINEURAL AS NEEDED
Status: DISCONTINUED | OUTPATIENT
Start: 2025-04-09 | End: 2025-04-09

## 2025-04-09 RX ORDER — SODIUM CHLORIDE, SODIUM LACTATE, POTASSIUM CHLORIDE, CALCIUM CHLORIDE 600; 310; 30; 20 MG/100ML; MG/100ML; MG/100ML; MG/100ML
100 INJECTION, SOLUTION INTRAVENOUS CONTINUOUS
Status: DISCONTINUED | OUTPATIENT
Start: 2025-04-09 | End: 2025-04-13 | Stop reason: HOSPADM

## 2025-04-09 RX ORDER — SODIUM CHLORIDE, SODIUM LACTATE, POTASSIUM CHLORIDE, CALCIUM CHLORIDE 600; 310; 30; 20 MG/100ML; MG/100ML; MG/100ML; MG/100ML
INJECTION, SOLUTION INTRAVENOUS CONTINUOUS PRN
Status: DISCONTINUED | OUTPATIENT
Start: 2025-04-09 | End: 2025-04-09

## 2025-04-09 RX ADMIN — PROPOFOL 100 MG: 10 INJECTION, EMULSION INTRAVENOUS at 07:58

## 2025-04-09 RX ADMIN — PROPOFOL 50 MG: 10 INJECTION, EMULSION INTRAVENOUS at 08:02

## 2025-04-09 RX ADMIN — PROPOFOL 50 MG: 10 INJECTION, EMULSION INTRAVENOUS at 08:00

## 2025-04-09 RX ADMIN — SODIUM CHLORIDE, SODIUM LACTATE, POTASSIUM CHLORIDE, AND CALCIUM CHLORIDE: .6; .31; .03; .02 INJECTION, SOLUTION INTRAVENOUS at 07:55

## 2025-04-09 RX ADMIN — LIDOCAINE HYDROCHLORIDE 100 MG: 20 INJECTION, SOLUTION EPIDURAL; INFILTRATION; INTRACAUDAL; PERINEURAL at 07:58

## 2025-04-09 RX ADMIN — SODIUM CHLORIDE, SODIUM LACTATE, POTASSIUM CHLORIDE, AND CALCIUM CHLORIDE 100 ML/HR: .6; .31; .03; .02 INJECTION, SOLUTION INTRAVENOUS at 07:45

## 2025-04-09 RX ADMIN — PROPOFOL 50 MG: 10 INJECTION, EMULSION INTRAVENOUS at 08:06

## 2025-04-09 RX ADMIN — PROPOFOL 50 MG: 10 INJECTION, EMULSION INTRAVENOUS at 08:09

## 2025-04-09 NOTE — ANESTHESIA PREPROCEDURE EVALUATION
Procedure:  COLONOSCOPY    Relevant Problems   CARDIO   (+) Essential hypertension      ENDO   (+) Type 2 diabetes mellitus without complication, without long-term current use of insulin (HCC)      /RENAL   (+) Chronic kidney disease, stage 3b (HCC)   (+) Chronic kidney disease-mineral and bone disorder   (+) Stage 3a chronic kidney disease (HCC)        Physical Exam    Airway    Mallampati score: III  TM Distance: >3 FB  Neck ROM: full     Dental   No notable dental hx     Cardiovascular  Cardiovascular exam normal    Pulmonary  Pulmonary exam normal     Other Findings        Anesthesia Plan  ASA Score- 2     Anesthesia Type- IV sedation with anesthesia with ASA Monitors.         Additional Monitors:     Airway Plan:            Plan Factors-Exercise tolerance (METS): >4 METS.    Chart reviewed.    Patient summary reviewed.    Patient is not a current smoker.              Induction- intravenous.    Postoperative Plan-         Informed Consent- Anesthetic plan and risks discussed with patient.        NPO Status:  Vitals Value Taken Time   Date of last liquid 04/08/25 04/09/25 0731   Time of last liquid 2300 04/09/25 0731   Date of last solid 04/08/25 04/09/25 0731   Time of last solid 0900 04/09/25 0731

## 2025-04-09 NOTE — ANESTHESIA POSTPROCEDURE EVALUATION
Post-Op Assessment Note    CV Status:  Stable  Pain Score: 0    Pain management: adequate       Mental Status:  Sleepy   Hydration Status:  Euvolemic   PONV Controlled:  Controlled   Airway Patency:  Patent     Post Op Vitals Reviewed: Yes    No anethesia notable event occurred.    Staff: CRNA           Last Filed PACU Vitals:  Vitals Value Taken Time   Temp 97 °F (36.1 °C) 04/09/25 0817   Pulse 79 04/09/25 0817   /79 04/09/25 0817   Resp 21 04/09/25 0817   SpO2 95 % 04/09/25 0817       Modified Harshil:     Vitals Value Taken Time   Activity 2 04/09/25 0817   Respiration 2 04/09/25 0817   Circulation 2 04/09/25 0817   Consciousness 1 04/09/25 0817   Oxygen Saturation 2 04/09/25 0817     Modified Harshil Score: 9

## 2025-04-09 NOTE — H&P
"History and Physical -  [facility]  Agusto Riojas 60 y.o. male MRN: [id number]                  HPI: Agusto Riojas is a 60 y.o. year old male who presents for colonoscopy for history of colon polyps      REVIEW OF SYSTEMS: Per the HPI, and otherwise unremarkable.    Historical Information   Past Medical History:   Diagnosis Date    Chronic kidney disease     Colon polyp     Depression     Diabetes mellitus (HCC)     pre    Epididymitis 8/18/22    Hyperlipidemia     Hypertension      Past Surgical History:   Procedure Laterality Date    COLONOSCOPY      COLONOSCOPY      SHOULDER SURGERY Left 10/2022    THYROID SURGERY  04/02/2024    US GUIDED THYROID BIOPSY  11/14/2023    WISDOM TOOTH EXTRACTION       Social History   Social History     Substance and Sexual Activity   Alcohol Use Yes    Alcohol/week: 1.0 standard drink of alcohol    Types: 1 Standard drinks or equivalent per week    Comment: Once a week     Social History     Substance and Sexual Activity   Drug Use Never     Social History     Tobacco Use   Smoking Status Never    Passive exposure: Never   Smokeless Tobacco Never     Family History   Problem Relation Age of Onset    Hypertension Mother     Arthritis Mother     No Known Problems Father     Pancreatic cancer Sister     Cancer Sister     Other Sister         AIDS    Pancreatic cancer Brother     Cancer Brother     No Known Problems Brother     No Known Problems Brother        Meds/Allergies     Not in a hospital admission.    No Known Allergies    Objective     Blood pressure 125/87, pulse 81, temperature 98 °F (36.7 °C), temperature source Temporal, resp. rate 16, height 6' 1\" (1.854 m), weight 116 kg (256 lb 6.3 oz), SpO2 97%.      PHYSICAL EXAM    /87   Pulse 81   Temp 98 °F (36.7 °C) (Temporal)   Resp 16   Ht 6' 1\" (1.854 m)   Wt 116 kg (256 lb 6.3 oz)   SpO2 97%   BMI 33.83 kg/m²       Gen: NAD  CV: RRR  CHEST: Clear  ABD: soft, NT/ND  EXT: no " edema      ASSESSMENT/PLAN:  This is a 60 y.o. year old male here for cvolonoscopy, and he is stable and optimized for his procedure.

## 2025-04-11 PROCEDURE — 88305 TISSUE EXAM BY PATHOLOGIST: CPT | Performed by: STUDENT IN AN ORGANIZED HEALTH CARE EDUCATION/TRAINING PROGRAM

## 2025-04-14 ENCOUNTER — RESULTS FOLLOW-UP (OUTPATIENT)
Dept: GASTROENTEROLOGY | Facility: CLINIC | Age: 60
End: 2025-04-14

## 2025-05-06 ENCOUNTER — APPOINTMENT (OUTPATIENT)
Dept: LAB | Facility: CLINIC | Age: 60
End: 2025-05-06
Payer: COMMERCIAL

## 2025-05-06 DIAGNOSIS — E11.9 TYPE 2 DIABETES MELLITUS WITHOUT COMPLICATION, WITHOUT LONG-TERM CURRENT USE OF INSULIN (HCC): ICD-10-CM

## 2025-05-06 LAB
ALBUMIN SERPL BCG-MCNC: 4.2 G/DL (ref 3.5–5)
ALP SERPL-CCNC: 75 U/L (ref 34–104)
ALT SERPL W P-5'-P-CCNC: 33 U/L (ref 7–52)
ANION GAP SERPL CALCULATED.3IONS-SCNC: 8 MMOL/L (ref 4–13)
AST SERPL W P-5'-P-CCNC: 27 U/L (ref 13–39)
BILIRUB SERPL-MCNC: 0.51 MG/DL (ref 0.2–1)
BUN SERPL-MCNC: 14 MG/DL (ref 5–25)
CALCIUM SERPL-MCNC: 9.2 MG/DL (ref 8.4–10.2)
CHLORIDE SERPL-SCNC: 106 MMOL/L (ref 96–108)
CO2 SERPL-SCNC: 28 MMOL/L (ref 21–32)
CREAT SERPL-MCNC: 1.45 MG/DL (ref 0.6–1.3)
CREAT UR-MCNC: 208.1 MG/DL
ERYTHROCYTE [DISTWIDTH] IN BLOOD BY AUTOMATED COUNT: 13.4 % (ref 11.6–15.1)
EST. AVERAGE GLUCOSE BLD GHB EST-MCNC: 134 MG/DL
GFR SERPL CREATININE-BSD FRML MDRD: 51 ML/MIN/1.73SQ M
GLUCOSE P FAST SERPL-MCNC: 104 MG/DL (ref 65–99)
HBA1C MFR BLD: 6.3 %
HCT VFR BLD AUTO: 45.5 % (ref 36.5–49.3)
HGB BLD-MCNC: 14.4 G/DL (ref 12–17)
MCH RBC QN AUTO: 29.8 PG (ref 26.8–34.3)
MCHC RBC AUTO-ENTMCNC: 31.6 G/DL (ref 31.4–37.4)
MCV RBC AUTO: 94 FL (ref 82–98)
MICROALBUMIN UR-MCNC: 393.9 MG/L
MICROALBUMIN/CREAT 24H UR: 189 MG/G CREATININE (ref 0–30)
PLATELET # BLD AUTO: 223 THOUSANDS/UL (ref 149–390)
PMV BLD AUTO: 10.7 FL (ref 8.9–12.7)
POTASSIUM SERPL-SCNC: 4.4 MMOL/L (ref 3.5–5.3)
PROT SERPL-MCNC: 6.7 G/DL (ref 6.4–8.4)
RBC # BLD AUTO: 4.83 MILLION/UL (ref 3.88–5.62)
SODIUM SERPL-SCNC: 142 MMOL/L (ref 135–147)
TSH SERPL DL<=0.05 MIU/L-ACNC: 2.68 UIU/ML (ref 0.45–4.5)
WBC # BLD AUTO: 4.09 THOUSAND/UL (ref 4.31–10.16)

## 2025-05-06 PROCEDURE — 85027 COMPLETE CBC AUTOMATED: CPT

## 2025-05-06 PROCEDURE — 36415 COLL VENOUS BLD VENIPUNCTURE: CPT

## 2025-05-06 PROCEDURE — 84443 ASSAY THYROID STIM HORMONE: CPT

## 2025-05-06 PROCEDURE — 82043 UR ALBUMIN QUANTITATIVE: CPT

## 2025-05-06 PROCEDURE — 82570 ASSAY OF URINE CREATININE: CPT

## 2025-05-06 PROCEDURE — 80053 COMPREHEN METABOLIC PANEL: CPT

## 2025-05-06 PROCEDURE — 83036 HEMOGLOBIN GLYCOSYLATED A1C: CPT

## 2025-05-19 ENCOUNTER — OFFICE VISIT (OUTPATIENT)
Dept: INTERNAL MEDICINE CLINIC | Facility: CLINIC | Age: 60
End: 2025-05-19
Payer: COMMERCIAL

## 2025-05-19 VITALS
BODY MASS INDEX: 33.53 KG/M2 | HEIGHT: 73 IN | TEMPERATURE: 98.5 F | HEART RATE: 66 BPM | SYSTOLIC BLOOD PRESSURE: 130 MMHG | WEIGHT: 253 LBS | OXYGEN SATURATION: 98 % | DIASTOLIC BLOOD PRESSURE: 82 MMHG

## 2025-05-19 DIAGNOSIS — R35.0 FREQUENCY OF MICTURITION: ICD-10-CM

## 2025-05-19 DIAGNOSIS — N52.9 ERECTILE DYSFUNCTION, UNSPECIFIED ERECTILE DYSFUNCTION TYPE: ICD-10-CM

## 2025-05-19 DIAGNOSIS — E11.9 TYPE 2 DIABETES MELLITUS WITHOUT COMPLICATION, WITHOUT LONG-TERM CURRENT USE OF INSULIN (HCC): ICD-10-CM

## 2025-05-19 DIAGNOSIS — Z00.00 WELCOME TO MEDICARE PREVENTIVE VISIT: Primary | ICD-10-CM

## 2025-05-19 DIAGNOSIS — I10 ESSENTIAL HYPERTENSION: ICD-10-CM

## 2025-05-19 DIAGNOSIS — Z90.09 HISTORY OF LOBECTOMY OF THYROID: ICD-10-CM

## 2025-05-19 DIAGNOSIS — N18.32 CHRONIC KIDNEY DISEASE, STAGE 3B (HCC): ICD-10-CM

## 2025-05-19 PROBLEM — M75.102 ROTATOR CUFF TEAR, LEFT: Status: ACTIVE | Noted: 2022-10-12

## 2025-05-19 PROCEDURE — G0402 INITIAL PREVENTIVE EXAM: HCPCS | Performed by: INTERNAL MEDICINE

## 2025-05-19 PROCEDURE — G0403 EKG FOR INITIAL PREVENT EXAM: HCPCS | Performed by: INTERNAL MEDICINE

## 2025-05-19 PROCEDURE — 99214 OFFICE O/P EST MOD 30 MIN: CPT | Performed by: INTERNAL MEDICINE

## 2025-05-19 PROCEDURE — G2211 COMPLEX E/M VISIT ADD ON: HCPCS | Performed by: INTERNAL MEDICINE

## 2025-05-19 RX ORDER — METHOCARBAMOL 750 MG/1
750 TABLET, FILM COATED ORAL
COMMUNITY
Start: 2025-03-24 | End: 2025-05-19

## 2025-05-19 RX ORDER — CYCLOSPORINE 0.5 MG/ML
EMULSION OPHTHALMIC
COMMUNITY
Start: 2025-02-11

## 2025-05-19 RX ORDER — CYCLOBENZAPRINE HCL 5 MG
5 TABLET ORAL 3 TIMES DAILY PRN
COMMUNITY
Start: 2025-05-16 | End: 2025-07-15

## 2025-05-19 NOTE — PROGRESS NOTES
Name: Agusto Riojas      : 1965      MRN: [id number]  Encounter Provider: Deon Mcadams MD  Encounter Date: 2025   Encounter department: [facility] INTERNAL MEDIC[facility] [place]  :  Assessment & Plan  Welcome to Medicare preventive visit  Completed.  Orders:    POCT ECG    Essential hypertension  Controlled.   Orders:    TSH, 3rd generation with Free T4 reflex; Future      Type 2 diabetes mellitus without complication, without long-term current use of insulin (HCC)  Check A1c. Continue ozempic. Increase to 1 mg.    Lab Results   Component Value Date    HGBA1C 6.3 (H) 2025       Orders:    Albumin / creatinine urine ratio; Future    Comprehensive metabolic panel; Future    Hemoglobin A1C; Future    Lipid Panel with Direct LDL reflex; Future    semaglutide, 1 mg/dose, (Ozempic) 4 mg/3 mL injection pen; Inject 0.75 mL (1 mg total) under the skin once a week      Chronic kidney disease, stage 3b (HCC)  Lab Results   Component Value Date    EGFR 51 2025    EGFR 47 10/10/2024    EGFR 47 2024    CREATININE 1.45 (H) 2025    CREATININE 1.56 (H) 10/10/2024    CREATININE 1.56 (H) 2024                History of lobectomy of thyroid  2024, Thyroid mass- s/p left hemithyroidectomy.  Final pathology suggests benign thyroid nodules. There is no evidence of malignancy.  Check TSH       Erectile dysfunction, unspecified erectile dysfunction type    Orders:    Ambulatory Referral to Urology; Future    PSA, total and free; Future    Frequency of micturition    Orders:    PSA, total and free; Future      Depression Screening and Follow-up Plan: Patient was screened for depression during today's encounter. They screened negative with a PHQ-2 score of 0.        Preventive health issues were discussed with patient, and age appropriate screening tests were ordered as noted in patient's After Visit Summary. Personalized health advice and appropriate referrals for health education or  preventive services given if needed, as noted in patient's After Visit Summary.    History of Present Illness     AWV       Patient Care Team:  Deon Mcadams MD as PCP - General (Internal Medicine)  Deon Mcadams MD as PCP - PCP-[facility] ([facility])  Sophie Bethea MD as Surgeon (Surgical Oncology)  Gerald Virgen MD (Nephrology)  Bhupendra Felix as  (Oncology)  Andrés Shah MD (General Surgery)  Lauren Peñaloza PA-C as Physician Assistant (Urology)    Review of Systems   Constitutional:  Negative for chills and fever.   HENT:  Negative for ear pain and sore throat.    Eyes:  Negative for pain and visual disturbance.   Respiratory:  Negative for cough and shortness of breath.    Cardiovascular:  Negative for chest pain and palpitations.   Gastrointestinal:  Negative for abdominal pain and vomiting.   Genitourinary:  Negative for dysuria and hematuria.   Musculoskeletal:  Positive for arthralgias. Negative for back pain.   Skin:  Negative for color change and rash.   Neurological:  Negative for seizures and syncope.   All other systems reviewed and are negative.    Medical History Reviewed by provider this encounter:  Tobacco  Allergies  Meds  Problems  Med Hx  Surg Hx  Fam Hx       Annual Wellness Visit Questionnaire   Agusto is here for his Subsequent Wellness visit. Last Medicare Wellness visit information reviewed, patient interviewed and updates made to the record today.      Health Risk Assessment:   Patient rates overall health as fair. Patient feels that their physical health rating is same. Patient is satisfied with their life. Eyesight was rated as slightly worse. Hearing was rated as same. Patient feels that their emotional and mental health rating is same. Patients states they are never, rarely angry. Patient states they are sometimes unusually tired/fatigued. Pain experienced in the last 7 days has been a lot. Patient's pain rating has been  6/10. Patient states that he has experienced no weight loss or gain in last 6 months.     Depression Screening:   PHQ-2 Score: 0      Fall Risk Screening:   In the past year, patient has experienced: no history of falling in past year      Home Safety:  Patient does not have trouble with stairs inside or outside of their home. Patient has working smoke alarms and has working carbon monoxide detector. Home safety hazards include: none.     Nutrition:   Current diet is Regular.     Medications:   Patient is not currently taking any over-the-counter supplements. Patient is able to manage medications.     Activities of Daily Living (ADLs)/Instrumental Activities of Daily Living (IADLs):   Walk and transfer into and out of bed and chair?: Yes  Dress and groom yourself?: Yes    Bathe or shower yourself?: Yes    Feed yourself? Yes  Do your laundry/housekeeping?: Yes  Manage your money, pay your bills and track your expenses?: Yes  Make your own meals?: Yes    Do your own shopping?: Yes    Previous Hospitalizations:   Any hospitalizations or ED visits within the last 12 months?: No      Advance Care Planning:   Living will: No    Durable POA for healthcare: No    Advanced directive: No      Cognitive Screening:   Provider or family/friend/caregiver concerned regarding cognition?: No    Preventive Screenings      Cardiovascular Screening:    General: History Lipid Disorder and Risks and Benefits Discussed    Due for: Lipid Panel      Diabetes Screening:     General: History Diabetes and Screening Current      Colorectal Cancer Screening:     General: Screening Current      Prostate Cancer Screening:    General: Screening Current      Osteoporosis Screening:    General: Screening Not Indicated      Abdominal Aortic Aneurysm (AAA) Screening:        General: Screening Not Indicated      Lung Cancer Screening:     General: Screening Not Indicated      Hepatitis C Screening:    General: Screening Current    Immunizations:  -  Immunizations due: Zoster (Shingrix)    Screening, Brief Intervention, and Referral to Treatment (SBIRT)     Screening  Typical number of drinks in a day: 1  Typical number of drinks in a week: 1  Interpretation: Low risk drinking behavior.    AUDIT-C Screenin) How often did you have a drink containing alcohol in the past year? 2 to 4 times a month  2) How many drinks did you have on a typical day when you were drinking in the past year? 1 to 2  3) How often did you have 6 or more drinks on one occasion in the past year? never    AUDIT-C Score: 2  Interpretation: Score 0-3 (male): Negative screen for alcohol misuse    Single Item Drug Screening:  How often have you used an illegal drug (including marijuana) or a prescription medication for non-medical reasons in the past year? never    Single Item Drug Screen Score: 0  Interpretation: Negative screen for possible drug use disorder    Brief Intervention  Alcohol & drug use screenings were reviewed. No concerns regarding substance use disorder identified.     Other Counseling Topics:   Car/seat belt/driving safety and calcium and vitamin D intake.     Social Drivers of Health     Financial Resource Strain: Low Risk  (2024)    Received from [facility]    Overall Financial Resource Strain (CARDIA)     Difficulty of Paying Living Expenses: Not hard at all   Food Insecurity: No Food Insecurity (2025)    Nursing - Inadequate Food Risk Classification     Worried About Running Out of Food in the Last Year: Never true     Ran Out of Food in the Last Year: Never true   Transportation Needs: No Transportation Needs (2025)    PRAPARE - Transportation     Lack of Transportation (Medical): No     Lack of Transportation (Non-Medical): No   Housing Stability: Low Risk  (2025)    Housing Stability Vital Sign     Unable to Pay for Housing in the Last Year: No     Number of Times Moved in the Last Year: 0     Homeless in the Last Year: No  "  Utilities: Not At Risk (5/19/2025)    [facility] Utilities     Threatened with loss of utilities: No     Vision Screening    Right eye Left eye Both eyes   Without correction      With correction 20/25 20/25 20/20       Objective   /82 (BP Location: Left arm, Patient Position: Sitting, Cuff Size: Large)   Pulse 66   Temp 98.5 °F (36.9 °C) (Temporal)   Ht 6' 1\" (1.854 m)   Wt 115 kg (253 lb)   SpO2 98%   BMI 33.38 kg/m²     Physical Exam  Vitals and nursing note reviewed.   Constitutional:       General: He is not in acute distress.     Appearance: He is well-developed.   HENT:      Head: Normocephalic and atraumatic.     Eyes:      Conjunctiva/sclera: Conjunctivae normal.       Cardiovascular:      Rate and Rhythm: Normal rate and regular rhythm.      Heart sounds: No murmur heard.  Pulmonary:      Effort: Pulmonary effort is normal. No respiratory distress.      Breath sounds: Normal breath sounds.   Abdominal:      Palpations: Abdomen is soft.      Tenderness: There is no abdominal tenderness.     Musculoskeletal:         General: No swelling.      Cervical back: Neck supple.     Skin:     General: Skin is warm and dry.      Capillary Refill: Capillary refill takes less than 2 seconds.     Neurological:      Mental Status: He is alert.     Psychiatric:         Mood and Affect: Mood normal.         "

## 2025-05-19 NOTE — ASSESSMENT & PLAN NOTE
Lab Results   Component Value Date    EGFR 51 05/06/2025    EGFR 47 10/10/2024    EGFR 47 09/26/2024    CREATININE 1.45 (H) 05/06/2025    CREATININE 1.56 (H) 10/10/2024    CREATININE 1.56 (H) 09/26/2024

## 2025-05-19 NOTE — ASSESSMENT & PLAN NOTE
5/2024, Thyroid mass- s/p left hemithyroidectomy.  Final pathology suggests benign thyroid nodules. There is no evidence of malignancy.  Check TSH

## 2025-05-19 NOTE — PATIENT INSTRUCTIONS
Medicare Preventive Visit Patient Instructions  Thank you for completing your Welcome to Medicare Visit or Medicare Annual Wellness Visit today. Your next wellness visit will be due in one year (5/20/2026).  The screening/preventive services that you may require over the next 5-10 years are detailed below. Some tests may not apply to you based off risk factors and/or age. Screening tests ordered at today's visit but not completed yet may show as past due. Also, please note that scanned in results may not display below.  Preventive Screenings:  Service Recommendations Previous Testing/Comments   Colorectal Cancer Screening  Colonoscopy    Fecal Occult Blood Test (FOBT)/Fecal Immunochemical Test (FIT)  Fecal DNA/Cologuard Test  Flexible Sigmoidoscopy Age: 45-75 years old   Colonoscopy: every 10 years (May be performed more frequently if at higher risk)  OR  FOBT/FIT: every 1 year  OR  Cologuard: every 3 years  OR  Sigmoidoscopy: every 5 years  Screening may be recommended earlier than age 45 if at higher risk for colorectal cancer. Also, an individualized decision between you and your healthcare provider will decide whether screening between the ages of 76-85 would be appropriate. Colonoscopy: 04/09/2025  FOBT/FIT: Not on file  Cologuard: Not on file  Sigmoidoscopy: Not on file    Screening Current     Prostate Cancer Screening Individualized decision between patient and health care provider in men between ages of 55-69   Medicare will cover every 12 months beginning on the day after your 50th birthday PSA: 1.210 ng/mL     Screening Current     Hepatitis C Screening Once for adults born between 1945 and 1965  More frequently in patients at high risk for Hepatitis C Hep C Antibody: 11/20/2021    Screening Current   Diabetes Screening 1-2 times per year if you're at risk for diabetes or have pre-diabetes Fasting glucose: 104 mg/dL (5/6/2025)  A1C: 6.3 % (5/6/2025)  History Diabetes  Screening Current   Cholesterol  Screening Once every 5 years if you don't have a lipid disorder. May order more often based on risk factors. Lipid panel: 10/10/2024  History Lipid Disorder  Risks and Benefits Discussed  Due for Lipid Panel      Other Preventive Screenings Covered by Medicare:  Abdominal Aortic Aneurysm (AAA) Screening: covered once if your at risk. You're considered to be at risk if you have a family history of AAA or a male between the age of 65-75 who smoking at least 100 cigarettes in your lifetime.  Lung Cancer Screening: covers low dose CT scan once per year if you meet all of the following conditions: (1) Age 55-77; (2) No signs or symptoms of lung cancer; (3) Current smoker or have quit smoking within the last 15 years; (4) You have a tobacco smoking history of at least 20 pack years (packs per day x number of years you smoked); (5) You get a written order from a healthcare provider.  Glaucoma Screening: covered annually if you're considered high risk: (1) You have diabetes OR (2) Family history of glaucoma OR (3)  aged 50 and older OR (4)  American aged 65 and older  Osteoporosis Screening: covered every 2 years if you meet one of the following conditions: (1) Have a vertebral abnormality; (2) On glucocorticoid therapy for more than 3 months; (3) Have primary hyperparathyroidism; (4) On osteoporosis medications and need to assess response to drug therapy.  HIV Screening: covered annually if you're between the age of 15-65. Also covered annually if you are younger than 15 and older than 65 with risk factors for HIV infection. For pregnant patients, it is covered up to 3 times per pregnancy.    Immunizations:  Immunization Recommendations   Influenza Vaccine Annual influenza vaccination during flu season is recommended for all persons aged >= 6 months who do not have contraindications   Pneumococcal Vaccine   * Pneumococcal conjugate vaccine = PCV13 (Prevnar 13), PCV15 (Vaxneuvance), PCV20 (Prevnar  20)  * Pneumococcal polysaccharide vaccine = PPSV23 (Pneumovax) Adults 19-65 yo with certain risk factors or if 65+ yo  If never received any pneumonia vaccine: recommend Prevnar 20 (PCV20)  Give PCV20 if previously received 1 dose of PCV13 or PPSV23   Hepatitis B Vaccine 3 dose series if at intermediate or high risk (ex: diabetes, end stage renal disease, liver disease)   Respiratory syncytial virus (RSV) Vaccine - COVERED BY MEDICARE PART D  * RSVPreF3 (Arexvy) CDC recommends that adults 60 years of age and older may receive a single dose of RSV vaccine using shared clinical decision-making (SCDM)   Tetanus (Td) Vaccine - COST NOT COVERED BY MEDICARE PART B Following completion of primary series, a booster dose should be given every 10 years to maintain immunity against tetanus. Td may also be given as tetanus wound prophylaxis.   Tdap Vaccine - COST NOT COVERED BY MEDICARE PART B Recommended at least once for all adults. For pregnant patients, recommended with each pregnancy.   Shingles Vaccine (Shingrix) - COST NOT COVERED BY MEDICARE PART B  2 shot series recommended in those 19 years and older who have or will have weakened immune systems or those 50 years and older     Health Maintenance Due:      Topic Date Due   • Colorectal Cancer Screening  04/08/2028   • HIV Screening  Completed   • Hepatitis C Screening  Completed     Immunizations Due:      Topic Date Due   • COVID-19 Vaccine (2 - 2024-25 season) 09/01/2024     Advance Directives   What are advance directives?  Advance directives are legal documents that state your wishes and plans for medical care. These plans are made ahead of time in case you lose your ability to make decisions for yourself. Advance directives can apply to any medical decision, such as the treatments you want, and if you want to donate organs.   What are the types of advance directives?  There are many types of advance directives, and each state has rules about how to use them. You  may choose a combination of any of the following:  Living will:  This is a written record of the treatment you want. You can also choose which treatments you do not want, which to limit, and which to stop at a certain time. This includes surgery, medicine, IV fluid, and tube feedings.   Durable power of  for healthcare (DPAHC):  This is a written record that states who you want to make healthcare choices for you when you are unable to make them for yourself. This person, called a proxy, is usually a family member or a friend. You may choose more than 1 proxy.  Do not resuscitate (DNR) order:  A DNR order is used in case your heart stops beating or you stop breathing. It is a request not to have certain forms of treatment, such as CPR. A DNR order may be included in other types of advance directives.  Medical directive:  This covers the care that you want if you are in a coma, near death, or unable to make decisions for yourself. You can list the treatments you want for each condition. Treatment may include pain medicine, surgery, blood transfusions, dialysis, IV or tube feedings, and a ventilator (breathing machine).  Values history:  This document has questions about your views, beliefs, and how you feel and think about life. This information can help others choose the care that you would choose.  Why are advance directives important?  An advance directive helps you control your care. Although spoken wishes may be used, it is better to have your wishes written down. Spoken wishes can be misunderstood, or not followed. Treatments may be given even if you do not want them. An advance directive may make it easier for your family to make difficult choices about your care.   Weight Management   Why it is important to manage your weight:  Being overweight increases your risk of health conditions such as heart disease, high blood pressure, type 2 diabetes, and certain types of cancer. It can also increase your  risk for osteoarthritis, sleep apnea, and other respiratory problems. Aim for a slow, steady weight loss. Even a small amount of weight loss can lower your risk of health problems.  How to lose weight safely:  A safe and healthy way to lose weight is to eat fewer calories and get regular exercise. You can lose up about 1 pound a week by decreasing the number of calories you eat by 500 calories each day.   Healthy meal plan for weight management:  A healthy meal plan includes a variety of foods, contains fewer calories, and helps you stay healthy. A healthy meal plan includes the following:  Eat whole-grain foods more often.  A healthy meal plan should contain fiber. Fiber is the part of grains, fruits, and vegetables that is not broken down by your body. Whole-grain foods are healthy and provide extra fiber in your diet. Some examples of whole-grain foods are whole-wheat breads and pastas, oatmeal, brown rice, and bulgur.  Eat a variety of vegetables every day.  Include dark, leafy greens such as spinach, kale, stephen greens, and mustard greens. Eat yellow and orange vegetables such as carrots, sweet potatoes, and winter squash.   Eat a variety of fruits every day.  Choose fresh or canned fruit (canned in its own juice or light syrup) instead of juice. Fruit juice has very little or no fiber.  Eat low-fat dairy foods.  Drink fat-free (skim) milk or 1% milk. Eat fat-free yogurt and low-fat cottage cheese. Try low-fat cheeses such as mozzarella and other reduced-fat cheeses.  Choose meat and other protein foods that are low in fat.  Choose beans or other legumes such as split peas or lentils. Choose fish, skinless poultry (chicken or turkey), or lean cuts of red meat (beef or pork). Before you cook meat or poultry, cut off any visible fat.   Use less fat and oil.  Try baking foods instead of frying them. Add less fat, such as margarine, sour cream, regular salad dressing and mayonnaise to foods. Eat fewer high-fat  foods. Some examples of high-fat foods include french fries, doughnuts, ice cream, and cakes.  Eat fewer sweets.  Limit foods and drinks that are high in sugar. This includes candy, cookies, regular soda, and sweetened drinks.  Exercise:  Exercise at least 30 minutes per day on most days of the week. Some examples of exercise include walking, biking, dancing, and swimming. You can also fit in more physical activity by taking the stairs instead of the elevator or parking farther away from stores. Ask your healthcare provider about the best exercise plan for you.    © Copyright [employer] 2018 Information is for End User's use only and may not be sold, redistributed or otherwise used for commercial purposes. All illustrations and images included in CareNotes® are the copyrighted property of [employer]M., Inc. or [employer]

## 2025-05-19 NOTE — ASSESSMENT & PLAN NOTE
Controlled.   Orders:    TSH, 3rd generation with Free T4 reflex; Future       Epidermal Closure Graft Donor Site (Optional): simple interrupted

## 2025-05-19 NOTE — ASSESSMENT & PLAN NOTE
Check A1c. Continue ozempic. Increase to 1 mg.    Lab Results   Component Value Date    HGBA1C 6.3 (H) 05/06/2025       Orders:    Albumin / creatinine urine ratio; Future    Comprehensive metabolic panel; Future    Hemoglobin A1C; Future    Lipid Panel with Direct LDL reflex; Future    semaglutide, 1 mg/dose, (Ozempic) 4 mg/3 mL injection pen; Inject 0.75 mL (1 mg total) under the skin once a week

## 2025-05-20 LAB
ALBUMIN/CREAT UR: 108 MG/G CREAT
CREAT UR-MCNC: 257 MG/DL (ref 20–320)
MICROALBUMIN UR-MCNC: 27.8 MG/DL

## 2025-05-22 ENCOUNTER — TELEPHONE (OUTPATIENT)
Age: 60
End: 2025-05-22

## 2025-06-11 ENCOUNTER — APPOINTMENT (OUTPATIENT)
Dept: LAB | Facility: CLINIC | Age: 60
End: 2025-06-11
Payer: COMMERCIAL

## 2025-06-11 DIAGNOSIS — Z01.812 PRE-OPERATIVE LABORATORY EXAMINATION: ICD-10-CM

## 2025-06-11 DIAGNOSIS — I10 ESSENTIAL HYPERTENSION: ICD-10-CM

## 2025-06-11 DIAGNOSIS — R39.89 SUSPECTED UTI: ICD-10-CM

## 2025-06-11 DIAGNOSIS — N52.9 ERECTILE DYSFUNCTION, UNSPECIFIED ERECTILE DYSFUNCTION TYPE: ICD-10-CM

## 2025-06-11 DIAGNOSIS — R35.0 FREQUENCY OF MICTURITION: ICD-10-CM

## 2025-06-11 DIAGNOSIS — N40.1 BENIGN PROSTATIC HYPERPLASIA WITH LOWER URINARY TRACT SYMPTOMS, SYMPTOM DETAILS UNSPECIFIED: ICD-10-CM

## 2025-06-11 DIAGNOSIS — Z01.818 OTHER SPECIFIED PRE-OPERATIVE EXAMINATION: ICD-10-CM

## 2025-06-11 LAB
ABO GROUP BLD: NORMAL
ANION GAP SERPL CALCULATED.3IONS-SCNC: 9 MMOL/L (ref 4–13)
BASOPHILS # BLD AUTO: 0.03 THOUSANDS/ÂΜL (ref 0–0.1)
BASOPHILS NFR BLD AUTO: 1 % (ref 0–1)
BLD GP AB SCN SERPL QL: NEGATIVE
BUN SERPL-MCNC: 13 MG/DL (ref 5–25)
CALCIUM SERPL-MCNC: 9 MG/DL (ref 8.4–10.2)
CHLORIDE SERPL-SCNC: 105 MMOL/L (ref 96–108)
CO2 SERPL-SCNC: 28 MMOL/L (ref 21–32)
CREAT SERPL-MCNC: 1.62 MG/DL (ref 0.6–1.3)
EOSINOPHIL # BLD AUTO: 0.11 THOUSAND/ÂΜL (ref 0–0.61)
EOSINOPHIL NFR BLD AUTO: 3 % (ref 0–6)
ERYTHROCYTE [DISTWIDTH] IN BLOOD BY AUTOMATED COUNT: 13.7 % (ref 11.6–15.1)
GFR SERPL CREATININE-BSD FRML MDRD: 44 ML/MIN/1.73SQ M
GLUCOSE P FAST SERPL-MCNC: 103 MG/DL (ref 65–99)
HCT VFR BLD AUTO: 47.8 % (ref 36.5–49.3)
HGB BLD-MCNC: 14.9 G/DL (ref 12–17)
IMM GRANULOCYTES # BLD AUTO: 0.01 THOUSAND/UL (ref 0–0.2)
IMM GRANULOCYTES NFR BLD AUTO: 0 % (ref 0–2)
LYMPHOCYTES # BLD AUTO: 1.49 THOUSANDS/ÂΜL (ref 0.6–4.47)
LYMPHOCYTES NFR BLD AUTO: 38 % (ref 14–44)
MCH RBC QN AUTO: 29.6 PG (ref 26.8–34.3)
MCHC RBC AUTO-ENTMCNC: 31.2 G/DL (ref 31.4–37.4)
MCV RBC AUTO: 95 FL (ref 82–98)
MONOCYTES # BLD AUTO: 0.37 THOUSAND/ÂΜL (ref 0.17–1.22)
MONOCYTES NFR BLD AUTO: 9 % (ref 4–12)
NEUTROPHILS # BLD AUTO: 1.91 THOUSANDS/ÂΜL (ref 1.85–7.62)
NEUTS SEG NFR BLD AUTO: 49 % (ref 43–75)
NRBC BLD AUTO-RTO: 0 /100 WBCS
PLATELET # BLD AUTO: 233 THOUSANDS/UL (ref 149–390)
PMV BLD AUTO: 11 FL (ref 8.9–12.7)
POTASSIUM SERPL-SCNC: 4.4 MMOL/L (ref 3.5–5.3)
PSA FREE MFR SERPL: 28.07 %
PSA FREE SERPL-MCNC: 0.33 NG/ML
PSA SERPL-MCNC: 1.16 NG/ML (ref 0–4)
RBC # BLD AUTO: 5.04 MILLION/UL (ref 3.88–5.62)
RH BLD: POSITIVE
SODIUM SERPL-SCNC: 142 MMOL/L (ref 135–147)
SPECIMEN EXPIRATION DATE: NORMAL
TSH SERPL DL<=0.05 MIU/L-ACNC: 1.29 UIU/ML (ref 0.45–4.5)
WBC # BLD AUTO: 3.92 THOUSAND/UL (ref 4.31–10.16)

## 2025-06-11 PROCEDURE — 86900 BLOOD TYPING SEROLOGIC ABO: CPT

## 2025-06-11 PROCEDURE — 87086 URINE CULTURE/COLONY COUNT: CPT

## 2025-06-11 PROCEDURE — 84153 ASSAY OF PSA TOTAL: CPT

## 2025-06-11 PROCEDURE — 86901 BLOOD TYPING SEROLOGIC RH(D): CPT

## 2025-06-11 PROCEDURE — 80048 BASIC METABOLIC PNL TOTAL CA: CPT

## 2025-06-11 PROCEDURE — 86850 RBC ANTIBODY SCREEN: CPT

## 2025-06-11 PROCEDURE — 85025 COMPLETE CBC W/AUTO DIFF WBC: CPT

## 2025-06-11 PROCEDURE — 84443 ASSAY THYROID STIM HORMONE: CPT

## 2025-06-11 PROCEDURE — 36415 COLL VENOUS BLD VENIPUNCTURE: CPT

## 2025-06-11 PROCEDURE — 84154 ASSAY OF PSA FREE: CPT

## 2025-06-12 LAB — BACTERIA UR CULT: NORMAL

## 2025-06-12 NOTE — PROGRESS NOTES
Name: Agusto Riojas      : 1965      MRN: [id number]  Encounter Provider: Mary Bailey PA-C  Encounter Date: 2025   Encounter department: [facility] UROLOGY [place]  :  Assessment & Plan  Microscopic hematuria  Cystoscopy 10/9/24 - negative  CT AP w contrast 24 - negative from  standpoint  Unable to have CT renal protocol at this time given elevated creatinine in which he is following with nephrology for       Erectile dysfunction, unspecified erectile dysfunction type  No benefit from Sildenafil 100 mg, however, he is unsure if he was taking this on an empty stomach  Discussed trial of Cialis. Prescription sent to pharmacy   Discussed ICI vs IPP if oral medications have no benefit   Follow up in 3-4 months for recheck   Orders:    tadalafil (CIALIS) 20 MG tablet; Take 1 tablet (20 mg total) by mouth daily as needed for erectile dysfunction    Benign prostatic hyperplasia with lower urinary tract symptoms, symptom details unspecified  Previously scheduled for HoLEP, however, has personal difficulties with his mother right now and will call when he is ready to proceed        Screening for prostate cancer  PSA 25 - 1.158           History of Present Illness   Agusto Riojas is a 60 y.o. male who presents for follow up.    Patient with BPH and may have HoLEP in future.     Presents today with ED. No benefit with sildenafil. Denies ADRs.     AUA SYMPTOM SCORE      Flowsheet Row Most Recent Value   AUA SYMPTOM SCORE    How often have you had a sensation of not emptying your bladder completely after you finished urinating? 0 (P)     How often have you had to urinate again less than two hours after you finished urinating? 0 (P)     How often have you found you stopped and started again several times when you urinate? 0 (P)     How often have you found it difficult to postpone urination? 0 (P)     How often have you had a weak urinary stream? 0 (P)     How often have you had to  push or strain to begin urination? 0 (P)     How many times did you most typically get up to urinate from the time you went to bed at night until the time you got up in the morning? 1 (P)     Quality of Life: If you were to spend the rest of your life with your urinary condition just the way it is now, how would you feel about that? 2 (P)     AUA SYMPTOM SCORE 1 (P)            Review of Systems   Constitutional:  Negative for activity change, appetite change, chills and fever.   HENT:  Negative for congestion and trouble swallowing.    Respiratory:  Negative for cough and shortness of breath.    Cardiovascular:  Negative for chest pain, palpitations and leg swelling.   Gastrointestinal:  Negative for abdominal pain, constipation, diarrhea, nausea and vomiting.   Genitourinary:  Negative for difficulty urinating, dysuria, flank pain, frequency, hematuria and urgency.   Musculoskeletal:  Negative for back pain and gait problem.   Skin:  Negative for wound.   Allergic/Immunologic: Negative for immunocompromised state.   Neurological:  Negative for dizziness and syncope.   Hematological:  Does not bruise/bleed easily.   Psychiatric/Behavioral:  Negative for confusion.    All other systems reviewed and are negative.         Objective   There were no vitals taken for this visit.    Physical Exam  Constitutional:       Appearance: Normal appearance.   HENT:      Head: Normocephalic.      Nose: Nose normal.      Mouth/Throat:      Pharynx: Oropharynx is clear.     Eyes:      Extraocular Movements: Extraocular movements intact.      Pupils: Pupils are equal, round, and reactive to light.     Pulmonary:      Effort: Pulmonary effort is normal.     Musculoskeletal:         General: Normal range of motion.      Cervical back: Normal range of motion.     Skin:     General: Skin is warm.     Neurological:      General: No focal deficit present.      Mental Status: He is alert and oriented to person, place, and time. Mental status  is at baseline.     Psychiatric:         Mood and Affect: Mood normal.         Behavior: Behavior normal.         Thought Content: Thought content normal.         Judgment: Judgment normal.           Results   Lab Results   Component Value Date    PSA 1.158 06/11/2025    PSA 1.210 10/10/2024    PSA 1.9 05/28/2022     Lab Results   Component Value Date    CALCIUM 9.0 06/11/2025    K 4.4 06/11/2025    CO2 28 06/11/2025     06/11/2025    BUN 13 06/11/2025    CREATININE 1.62 (H) 06/11/2025     Lab Results   Component Value Date    WBC 3.92 (L) 06/11/2025    HGB 14.9 06/11/2025    HCT 47.8 06/11/2025    MCV 95 06/11/2025     06/11/2025       Office Urine Dip  No results found for this or any previous visit (from the past hour).

## 2025-06-13 ENCOUNTER — OFFICE VISIT (OUTPATIENT)
Dept: UROLOGY | Facility: CLINIC | Age: 60
End: 2025-06-13
Payer: COMMERCIAL

## 2025-06-13 VITALS
OXYGEN SATURATION: 98 % | HEART RATE: 76 BPM | BODY MASS INDEX: 32.44 KG/M2 | RESPIRATION RATE: 18 BRPM | SYSTOLIC BLOOD PRESSURE: 122 MMHG | WEIGHT: 244.8 LBS | HEIGHT: 73 IN | TEMPERATURE: 97.7 F | DIASTOLIC BLOOD PRESSURE: 72 MMHG

## 2025-06-13 DIAGNOSIS — R31.29 MICROSCOPIC HEMATURIA: Primary | ICD-10-CM

## 2025-06-13 DIAGNOSIS — N52.9 ERECTILE DYSFUNCTION, UNSPECIFIED ERECTILE DYSFUNCTION TYPE: ICD-10-CM

## 2025-06-13 DIAGNOSIS — N40.1 BENIGN PROSTATIC HYPERPLASIA WITH LOWER URINARY TRACT SYMPTOMS, SYMPTOM DETAILS UNSPECIFIED: ICD-10-CM

## 2025-06-13 DIAGNOSIS — Z12.5 SCREENING FOR PROSTATE CANCER: ICD-10-CM

## 2025-06-13 PROCEDURE — 99213 OFFICE O/P EST LOW 20 MIN: CPT | Performed by: PHYSICIAN ASSISTANT

## 2025-06-13 RX ORDER — TADALAFIL 20 MG/1
20 TABLET ORAL DAILY PRN
Qty: 30 TABLET | Refills: 3 | Status: SHIPPED | OUTPATIENT
Start: 2025-06-13

## 2025-06-24 ENCOUNTER — TELEPHONE (OUTPATIENT)
Dept: UROLOGY | Facility: CLINIC | Age: 60
End: 2025-06-24

## 2025-06-24 NOTE — TELEPHONE ENCOUNTER
Called and spoke to the PT. PT scheduled an appt to see Lauren STEPHANIA on 7/3/25. Appt note stated New Issues so I asked the PT if everything was ok and what was going on. PT stated having Pain in groin area right side. No Blood in urine or UTI symptoms while having pain. Pain has subsided about a week ago. PT would still like to be seen.

## 2025-07-02 NOTE — PROGRESS NOTES
Name: Agusto Riojas      : 1965      MRN: [id number]  Encounter Provider: Sarah Schnitzler, PA-C  Encounter Date: 7/3/2025   Encounter department: [facility] UROLOGY [place]  :  Assessment & Plan  Groin pain, right  - Reports he has been asymptomatic  - Exam today unremarkable.   - If pain returns, US can be ordered  Orders:    POCT Measure PVR    POCT urine dip    Microscopic hematuria  - S/p negative work-up last year  - Urine dip trace blood. Negative nitrites or leuks. Sent out for UA micro       Benign prostatic hyperplasia with lower urinary tract symptoms, symptom details unspecified  - Previously scheduled for HoLEP however cancelled and does not wish to reschedule at this time  - PVR 39 mL        Screening for prostate cancer  PSA 25 - 1.158   Continue yearly screening  Orders:    PSA, Total Screen; Future    Other male erectile dysfunction  No benefit from Sildenafil 100 mg   Trialed Cialis 20 mg PRN with some benefit  Discussed ICI vs IPP if oral medications have no benefit        Follow up in 1 year with PSA    History of Present Illness   Agusto Riojas is a 60 y.o. male who presents for evaluation of new issue of groin pain. He reports since he made the appointment pain has resolved. He was having pain in right groin/testicle. Denies any other complaints today. He follows with our office for BPH, microhematuria s/p negative work-up last year, and ED.     Review of Systems   Constitutional:  Negative for chills and fever.   Respiratory:  Negative for shortness of breath.    Cardiovascular:  Negative for chest pain.   Gastrointestinal:  Negative for abdominal pain.   Genitourinary:  Negative for difficulty urinating, dysuria, flank pain, frequency, hematuria, scrotal swelling, testicular pain and urgency.   Neurological:  Negative for dizziness.          Objective   There were no vitals taken for this visit.    Physical Exam  Constitutional:       Appearance: Normal  appearance.   HENT:      Head: Normocephalic and atraumatic.      Right Ear: External ear normal.      Left Ear: External ear normal.      Nose: Nose normal.     Eyes:      General: No scleral icterus.     Conjunctiva/sclera: Conjunctivae normal.       Cardiovascular:      Pulses: Normal pulses.   Pulmonary:      Effort: Pulmonary effort is normal.   Abdominal:      Hernia: There is no hernia in the left inguinal area or right inguinal area.   Genitourinary:     Testes: Normal.         Right: Mass, tenderness, swelling or testicular hydrocele not present.         Left: Mass, tenderness, swelling or testicular hydrocele not present.      Epididymis:      Right: Normal.      Left: Normal.     Musculoskeletal:         General: Normal range of motion.      Cervical back: Normal range of motion.     Skin:     General: Skin is warm and dry.     Neurological:      General: No focal deficit present.      Mental Status: He is alert and oriented to person, place, and time.     Psychiatric:         Mood and Affect: Mood normal.         Behavior: Behavior normal.         Thought Content: Thought content normal.         Judgment: Judgment normal.          Results   Lab Results   Component Value Date    PSA 1.158 06/11/2025    PSA 1.210 10/10/2024    PSA 1.9 05/28/2022     Lab Results   Component Value Date    CALCIUM 9.0 06/11/2025    K 4.4 06/11/2025    CO2 28 06/11/2025     06/11/2025    BUN 13 06/11/2025    CREATININE 1.62 (H) 06/11/2025     Lab Results   Component Value Date    WBC 3.92 (L) 06/11/2025    HGB 14.9 06/11/2025    HCT 47.8 06/11/2025    MCV 95 06/11/2025     06/11/2025       Office Urine Dip  No results found for this or any previous visit (from the past hour).

## 2025-07-03 ENCOUNTER — OFFICE VISIT (OUTPATIENT)
Dept: UROLOGY | Facility: CLINIC | Age: 60
End: 2025-07-03
Payer: COMMERCIAL

## 2025-07-03 VITALS
HEIGHT: 73 IN | DIASTOLIC BLOOD PRESSURE: 80 MMHG | TEMPERATURE: 97.6 F | BODY MASS INDEX: 31.68 KG/M2 | WEIGHT: 239 LBS | OXYGEN SATURATION: 99 % | SYSTOLIC BLOOD PRESSURE: 126 MMHG | HEART RATE: 71 BPM

## 2025-07-03 DIAGNOSIS — N52.8 OTHER MALE ERECTILE DYSFUNCTION: ICD-10-CM

## 2025-07-03 DIAGNOSIS — R31.29 MICROSCOPIC HEMATURIA: ICD-10-CM

## 2025-07-03 DIAGNOSIS — R10.31 GROIN PAIN, RIGHT: Primary | ICD-10-CM

## 2025-07-03 DIAGNOSIS — Z12.5 SCREENING FOR PROSTATE CANCER: ICD-10-CM

## 2025-07-03 DIAGNOSIS — N40.1 BENIGN PROSTATIC HYPERPLASIA WITH LOWER URINARY TRACT SYMPTOMS, SYMPTOM DETAILS UNSPECIFIED: ICD-10-CM

## 2025-07-03 LAB
BACTERIA UR QL AUTO: ABNORMAL /HPF
BILIRUB UR QL STRIP: NEGATIVE
CLARITY UR: CLEAR
COLOR UR: YELLOW
GLUCOSE UR STRIP-MCNC: NEGATIVE MG/DL
HGB UR QL STRIP.AUTO: NEGATIVE
HYALINE CASTS #/AREA URNS LPF: ABNORMAL /LPF
KETONES UR STRIP-MCNC: ABNORMAL MG/DL
LEUKOCYTE ESTERASE UR QL STRIP: NEGATIVE
MUCOUS THREADS UR QL AUTO: ABNORMAL
NITRITE UR QL STRIP: NEGATIVE
NON-SQ EPI CELLS URNS QL MICRO: ABNORMAL /HPF
PH UR STRIP.AUTO: 5.5 [PH]
POST-VOID RESIDUAL VOLUME, ML POC: 39 ML
PROT UR STRIP-MCNC: ABNORMAL MG/DL
RBC #/AREA URNS AUTO: ABNORMAL /HPF
SL AMB  POCT GLUCOSE, UA: NORMAL
SL AMB LEUKOCYTE ESTERASE,UA: NORMAL
SL AMB POCT BILIRUBIN,UA: NORMAL
SL AMB POCT BLOOD,UA: NORMAL
SL AMB POCT CLARITY,UA: CLEAR
SL AMB POCT COLOR,UA: YELLOW
SL AMB POCT KETONES,UA: NORMAL
SL AMB POCT NITRITE,UA: NORMAL
SL AMB POCT PH,UA: 5
SL AMB POCT SPECIFIC GRAVITY,UA: 1.03
SL AMB POCT URINE PROTEIN: NORMAL
SL AMB POCT UROBILINOGEN: 0.2
SP GR UR STRIP.AUTO: 1.03 (ref 1–1.03)
UROBILINOGEN UR STRIP-ACNC: <2 MG/DL
WBC #/AREA URNS AUTO: ABNORMAL /HPF

## 2025-07-03 PROCEDURE — 81002 URINALYSIS NONAUTO W/O SCOPE: CPT | Performed by: PHYSICIAN ASSISTANT

## 2025-07-03 PROCEDURE — 81001 URINALYSIS AUTO W/SCOPE: CPT | Performed by: PHYSICIAN ASSISTANT

## 2025-07-03 PROCEDURE — 51798 US URINE CAPACITY MEASURE: CPT | Performed by: PHYSICIAN ASSISTANT

## 2025-07-03 PROCEDURE — 99214 OFFICE O/P EST MOD 30 MIN: CPT | Performed by: PHYSICIAN ASSISTANT
